# Patient Record
Sex: MALE | Race: WHITE | HISPANIC OR LATINO | Employment: FULL TIME | URBAN - METROPOLITAN AREA
[De-identification: names, ages, dates, MRNs, and addresses within clinical notes are randomized per-mention and may not be internally consistent; named-entity substitution may affect disease eponyms.]

---

## 2017-03-13 ENCOUNTER — ALLSCRIPTS OFFICE VISIT (OUTPATIENT)
Dept: OTHER | Facility: OTHER | Age: 32
End: 2017-03-13

## 2017-03-20 ENCOUNTER — OFFICE VISIT (OUTPATIENT)
Dept: URGENT CARE | Age: 32
End: 2017-03-20
Payer: MEDICARE

## 2017-03-20 PROCEDURE — 99203 OFFICE O/P NEW LOW 30 MIN: CPT | Performed by: FAMILY MEDICINE

## 2017-03-20 PROCEDURE — G0463 HOSPITAL OUTPT CLINIC VISIT: HCPCS | Performed by: FAMILY MEDICINE

## 2017-05-07 ENCOUNTER — TRANSCRIBE ORDERS (OUTPATIENT)
Dept: LAB | Facility: HOSPITAL | Age: 32
End: 2017-05-07

## 2017-05-17 ENCOUNTER — APPOINTMENT (OUTPATIENT)
Dept: LAB | Facility: HOSPITAL | Age: 32
End: 2017-05-17
Payer: MEDICARE

## 2017-05-17 ENCOUNTER — TRANSCRIBE ORDERS (OUTPATIENT)
Dept: LAB | Facility: HOSPITAL | Age: 32
End: 2017-05-17

## 2017-05-17 DIAGNOSIS — M25.649: ICD-10-CM

## 2017-05-17 DIAGNOSIS — M25.649: Primary | ICD-10-CM

## 2017-05-17 PROCEDURE — 36415 COLL VENOUS BLD VENIPUNCTURE: CPT

## 2017-05-17 PROCEDURE — 86038 ANTINUCLEAR ANTIBODIES: CPT

## 2017-05-17 PROCEDURE — 86430 RHEUMATOID FACTOR TEST QUAL: CPT

## 2017-05-17 PROCEDURE — 86235 NUCLEAR ANTIGEN ANTIBODY: CPT

## 2017-05-18 LAB
ENA SS-A AB SER-ACNC: <0.2 AI (ref 0–0.9)
ENA SS-B AB SER-ACNC: <0.2 AI (ref 0–0.9)
RHEUMATOID FACT SER QL LA: NEGATIVE

## 2017-05-19 LAB — RYE IGE QN: NEGATIVE

## 2017-06-30 ENCOUNTER — TRANSCRIBE ORDERS (OUTPATIENT)
Dept: ADMINISTRATIVE | Facility: HOSPITAL | Age: 32
End: 2017-06-30

## 2017-06-30 DIAGNOSIS — N13.30 HYDRONEPHROSIS, UNSPECIFIED HYDRONEPHROSIS TYPE: ICD-10-CM

## 2017-06-30 DIAGNOSIS — N18.9 CHRONIC KIDNEY DISEASE, UNSPECIFIED: Primary | ICD-10-CM

## 2017-07-07 ENCOUNTER — HOSPITAL ENCOUNTER (OUTPATIENT)
Dept: RADIOLOGY | Facility: HOSPITAL | Age: 32
Discharge: HOME | End: 2017-07-07
Attending: UROLOGY
Payer: MEDICARE

## 2017-07-07 DIAGNOSIS — N18.9 CHRONIC KIDNEY DISEASE, UNSPECIFIED: ICD-10-CM

## 2017-07-07 DIAGNOSIS — N13.30 HYDRONEPHROSIS, UNSPECIFIED HYDRONEPHROSIS TYPE: ICD-10-CM

## 2017-07-07 PROCEDURE — 76770 US EXAM ABDO BACK WALL COMP: CPT

## 2017-09-01 ENCOUNTER — APPOINTMENT (OUTPATIENT)
Dept: LAB | Facility: HOSPITAL | Age: 32
End: 2017-09-01
Attending: INTERNAL MEDICINE
Payer: MEDICARE

## 2017-09-01 ENCOUNTER — TRANSCRIBE ORDERS (OUTPATIENT)
Dept: LAB | Facility: HOSPITAL | Age: 32
End: 2017-09-01

## 2017-09-01 DIAGNOSIS — N18.30 CHRONIC KIDNEY DISEASE, STAGE III (MODERATE) (HCC): Primary | ICD-10-CM

## 2017-09-01 DIAGNOSIS — N18.30 CHRONIC KIDNEY DISEASE, STAGE III (MODERATE) (HCC): ICD-10-CM

## 2017-09-01 LAB
ALBUMIN SERPL BCP-MCNC: 4.2 G/DL (ref 3.5–5)
ANION GAP SERPL CALCULATED.3IONS-SCNC: 6 MMOL/L (ref 4–13)
BASOPHILS # BLD AUTO: 0.01 THOUSANDS/ΜL (ref 0–0.1)
BASOPHILS NFR BLD AUTO: 0 % (ref 0–1)
BUN SERPL-MCNC: 14 MG/DL (ref 5–25)
CALCIUM SERPL-MCNC: 9 MG/DL (ref 8.3–10.1)
CHLORIDE SERPL-SCNC: 103 MMOL/L (ref 100–108)
CO2 SERPL-SCNC: 27 MMOL/L (ref 21–32)
CREAT SERPL-MCNC: 1.34 MG/DL (ref 0.6–1.3)
CREAT UR-MCNC: <13 MG/DL
EOSINOPHIL # BLD AUTO: 0.15 THOUSAND/ΜL (ref 0–0.61)
EOSINOPHIL NFR BLD AUTO: 3 % (ref 0–6)
ERYTHROCYTE [DISTWIDTH] IN BLOOD BY AUTOMATED COUNT: 13 % (ref 11.6–15.1)
GFR SERPL CREATININE-BSD FRML MDRD: 71 ML/MIN/1.73SQ M
GLUCOSE SERPL-MCNC: 73 MG/DL (ref 65–140)
HCT VFR BLD AUTO: 45.2 % (ref 36.5–49.3)
HGB BLD-MCNC: 15.5 G/DL (ref 12–17)
LYMPHOCYTES # BLD AUTO: 1.24 THOUSANDS/ΜL (ref 0.6–4.47)
LYMPHOCYTES NFR BLD AUTO: 21 % (ref 14–44)
MCH RBC QN AUTO: 30.8 PG (ref 26.8–34.3)
MCHC RBC AUTO-ENTMCNC: 34.3 G/DL (ref 31.4–37.4)
MCV RBC AUTO: 90 FL (ref 82–98)
MONOCYTES # BLD AUTO: 0.59 THOUSAND/ΜL (ref 0.17–1.22)
MONOCYTES NFR BLD AUTO: 10 % (ref 4–12)
NEUTROPHILS # BLD AUTO: 3.78 THOUSANDS/ΜL (ref 1.85–7.62)
NEUTS SEG NFR BLD AUTO: 66 % (ref 43–75)
NRBC BLD AUTO-RTO: 0 /100 WBCS
PHOSPHATE SERPL-MCNC: 2.5 MG/DL (ref 2.7–4.5)
PLATELET # BLD AUTO: 288 THOUSANDS/UL (ref 149–390)
PMV BLD AUTO: 10.8 FL (ref 8.9–12.7)
POTASSIUM SERPL-SCNC: 4.2 MMOL/L (ref 3.5–5.3)
PROT UR-MCNC: <6 MG/DL
PTH-INTACT SERPL-MCNC: 30.5 PG/ML (ref 14–72)
RBC # BLD AUTO: 5.04 MILLION/UL (ref 3.88–5.62)
SODIUM SERPL-SCNC: 136 MMOL/L (ref 136–145)
WBC # BLD AUTO: 5.81 THOUSAND/UL (ref 4.31–10.16)

## 2017-09-01 PROCEDURE — 83970 ASSAY OF PARATHORMONE: CPT

## 2017-09-01 PROCEDURE — 85025 COMPLETE CBC W/AUTO DIFF WBC: CPT

## 2017-09-01 PROCEDURE — 84156 ASSAY OF PROTEIN URINE: CPT

## 2017-09-01 PROCEDURE — 82570 ASSAY OF URINE CREATININE: CPT

## 2017-09-01 PROCEDURE — 80069 RENAL FUNCTION PANEL: CPT

## 2017-09-13 ENCOUNTER — ALLSCRIPTS OFFICE VISIT (OUTPATIENT)
Dept: OTHER | Facility: OTHER | Age: 32
End: 2017-09-13

## 2018-01-13 NOTE — MISCELLANEOUS
Message   Recorded as Task   Date: 07/25/2016 03:37 PM, Created By: Deniz Cat   Task Name: Miscellaneous   Assigned To: [facility] Alfred[facility] Edwards   Regarding Patient: Shaniqua Alvarez, Status: In Progress   Comment:    Adria Franklin - 25 Jul 2016 3:37 PM     TASK CREATED  Renal US reviewed - unremarkable left kidney and bladder, stable known severe chronic right hydronephrosis  Please call patient - no changes for now, follow up as instructed upcomng visit  ThanksKatie - 25 Jul 2016 3:40 PM     TASK IN PROGRESS   Per Dr April Yeung renal US reviewed- unremarkable left kidney and bladder, stable known severe chronic right hydronephrosis  Please let the patient know that no changes for know and follow up as instructed  Spoke with the patient and he is aware of his test results  [employer]      Active Problems    1  Abnormal gait (781 2) (R26 9)   2  Acute bronchitis, unspecified organism (466 0) (J20 9)   3  Asthma (493 90) (J45 909)   4  CKD (chronic kidney disease), stage III (585 3) (N18 3)   5  HTN (hypertension), benign (401 1) (I10)   6  Hydronephrosis, right (591) (N13 30)   7  Learning disability (315 2) (F81 9)   8  Screening for depression (V79 0) (Z13 89)   9  Vitamin D deficiency (268 9) (E55 9)    Current Meds   1  Ergocalciferol 41364 UNIT Oral Capsule; TAKE 1 CAPSULE WEEKLY; Therapy: 13SYL0015 to (Last Rx:[id number])  Requested for: 21Pmk9380 Ordered    Allergies    1  Sulphur-Heel SUBL    2  No Known Environmental Allergies   3   No Known Food Allergies    Signatures   Electronically signed by : ALEJANDRO Angela ; Jul 25 2016  3:50PM EST

## 2018-01-14 VITALS
HEART RATE: 72 BPM | BODY MASS INDEX: 27.84 KG/M2 | SYSTOLIC BLOOD PRESSURE: 122 MMHG | WEIGHT: 177.38 LBS | HEIGHT: 67 IN | TEMPERATURE: 97.9 F | DIASTOLIC BLOOD PRESSURE: 86 MMHG | OXYGEN SATURATION: 99 %

## 2018-01-15 VITALS
HEIGHT: 67 IN | BODY MASS INDEX: 28.09 KG/M2 | WEIGHT: 179 LBS | SYSTOLIC BLOOD PRESSURE: 128 MMHG | HEART RATE: 88 BPM | DIASTOLIC BLOOD PRESSURE: 78 MMHG

## 2018-01-16 NOTE — MISCELLANEOUS
Message  Patient came into the office today, to  a copy of his lab slips dated for June 2016  Patient did not want to schedule a office follow up appointment today  Active Problems    1  Abnormal gait (781 2) (R26 9)   2  Acute bronchitis, unspecified organism (466 0) (J20 9)   3  Asthma (493 90) (J45 909)   4  CKD (chronic kidney disease), stage III (585 3) (N18 3)   5  HTN (hypertension), benign (401 1) (I10)   6  Hydronephrosis, right (591) (N13 30)   7  Learning disability (315 2) (F81 9)   8  Screening for depression (V79 0) (Z13 89)   9  Vitamin D deficiency (268 9) (E55 9)    Current Meds   1  Ergocalciferol 90176 UNIT Oral Capsule; TAKE 1 CAPSULE WEEKLY; Therapy: 39KRN6695 to (Last Rx:[id number])  Requested for: 13Gjo9006 Ordered    Allergies    1  Sulphur-Heel SUBL    2  No Known Environmental Allergies   3   No Known Food Allergies    Signatures   Electronically signed by : ALEJANDRO Somers ; Jun 20 2016  9:48AM EST

## 2018-06-22 NOTE — PROGRESS NOTES
Patient has a hydronephrotic nonfunctioning right kidney    He has chronic renal disease and is followed by Nephrology, no urologic intervention

## 2018-06-28 ENCOUNTER — TELEPHONE (OUTPATIENT)
Dept: NEPHROLOGY | Facility: CLINIC | Age: 33
End: 2018-06-28

## 2018-06-28 NOTE — TELEPHONE ENCOUNTER
Patients Primary care doctor sent over his results for a BMP on June 1st and wants you to go over the results

## 2018-06-29 NOTE — TELEPHONE ENCOUNTER
Lab results from 06/01 reviewed, renal function stable  No changes at this moment, follow up as discussed before in 1 years form last office visit    Thanks,

## 2018-07-13 ENCOUNTER — OFFICE VISIT (OUTPATIENT)
Dept: UROLOGY | Facility: CLINIC | Age: 33
End: 2018-07-13
Payer: MEDICARE

## 2018-07-13 VITALS
BODY MASS INDEX: 29.89 KG/M2 | SYSTOLIC BLOOD PRESSURE: 116 MMHG | HEIGHT: 66 IN | HEART RATE: 75 BPM | DIASTOLIC BLOOD PRESSURE: 82 MMHG | WEIGHT: 186 LBS

## 2018-07-13 DIAGNOSIS — N18.9 CHRONIC KIDNEY DISEASE, UNSPECIFIED CKD STAGE: ICD-10-CM

## 2018-07-13 DIAGNOSIS — N13.39 OTHER HYDRONEPHROSIS: Primary | ICD-10-CM

## 2018-07-13 LAB
POST-VOID RESIDUAL VOLUME, ML POC: 16 ML
SL AMB  POCT GLUCOSE, UA: NORMAL
SL AMB LEUKOCYTE ESTERASE,UA: NORMAL
SL AMB POCT BILIRUBIN,UA: NORMAL
SL AMB POCT BLOOD,UA: NORMAL
SL AMB POCT CLARITY,UA: CLEAR
SL AMB POCT COLOR,UA: YELLOW
SL AMB POCT KETONES,UA: NORMAL
SL AMB POCT NITRITE,UA: NORMAL
SL AMB POCT PH,UA: 8
SL AMB POCT SPECIFIC GRAVITY,UA: 1
SL AMB POCT URINE PROTEIN: NORMAL
SL AMB POCT UROBILINOGEN: NORMAL

## 2018-07-13 PROCEDURE — 81002 URINALYSIS NONAUTO W/O SCOPE: CPT | Performed by: PHYSICIAN ASSISTANT

## 2018-07-13 PROCEDURE — 51798 US URINE CAPACITY MEASURE: CPT | Performed by: PHYSICIAN ASSISTANT

## 2018-07-13 PROCEDURE — 99213 OFFICE O/P EST LOW 20 MIN: CPT | Performed by: PHYSICIAN ASSISTANT

## 2018-07-13 NOTE — PROGRESS NOTES
UROLOGY PROGRESS NOTE   Patient Identifiers: Sol Guerrero (MRN [id number])  Date of Service: 7/13/2018    Subjective:     26-year-old male accompanied by his mother  He has a long history of a right UPJ obstruction  He has minimal function from his right kidney  He has associated chronic kidney disease and sees Dr Bebeto Hudson  His last creatinine was 1 34  Urine is clear  Postvoid residual is 16 milliliters  He has no dysuria or hematuria  He had a recent sleep study and also sees a psychiatrist     Patient has  no complaints  Objective:     VITALS:    Vitals:    07/13/18 1102   BP: 116/82   Pulse: 75     AUA SYMPTOM SCORE      Most Recent Value   AUA SYMPTOM SCORE   How often have you had a sensation of not emptying your bladder completely after you finished urinating? 4   How often have you had to urinate again less than two hours after you finished urinating? 3   How often have you found you stopped and started again several times when you urinate? 4   How often have you found it difficult to postpone urination? 4   How often have you had a weak urinary stream?  2   How often have you had to push or strain to begin urination? 2   How many times did you most typically get up to urinate from the time you went to bed at night until the time you got up in the morning?   3   Quality of Life: If you were to spend the rest of your life with your urinary condition just the way it is now, how would you feel about that?  3   AUA SYMPTOM SCORE  22            LABS:  Lab Results   Component Value Date    HGB 15 5 09/01/2017    HCT 45 2 09/01/2017    WBC 5 81 09/01/2017     09/01/2017   ]    Lab Results   Component Value Date     09/01/2017    K 4 2 09/01/2017     09/01/2017    CO2 27 09/01/2017    BUN 14 09/01/2017    CREATININE 1 34 (H) 09/01/2017    CALCIUM 9 0 09/01/2017    GLUCOSE 73 09/01/2017   ]    INPATIENT MEDS:    Current Outpatient Prescriptions:     acetaminophen (TYLENOL) 325 mg tablet, Take 650 mg by mouth every 6 (six) hours as needed for mild pain , Disp: , Rfl:     Cholecalciferol (VITAMIN D PO), Take 4,000 Units by mouth daily  , Disp: , Rfl:       Physical Exam:   /82 (Patient Position: Sitting, Cuff Size: Adult)   Pulse 75   Ht 5' 5 5" (1 664 m)   Wt 84 4 kg (186 lb)   BMI 30 48 kg/m²   GEN: no acute distress    RESP: breathing comfortably with no accessory muscle use    ABD: soft, non-tender, non-distended   INCISION:    EXT: no significant peripheral edema     RADIOLOGY:     Follow-up ultrasound is scheduled for later this year     Assessment:    1  Right hydronephrosis secondary to a chronic UPJ obstruction   2  Chronic kidney disease     Plan:   - I reviewed the plan with his mother  He will follow up on an as-needed basis and call with any questions or concerns    -  -  -

## 2018-08-26 ENCOUNTER — APPOINTMENT (OUTPATIENT)
Dept: LAB | Facility: HOSPITAL | Age: 33
End: 2018-08-26
Payer: MEDICARE

## 2018-08-26 ENCOUNTER — TRANSCRIBE ORDERS (OUTPATIENT)
Dept: LAB | Facility: HOSPITAL | Age: 33
End: 2018-08-26

## 2018-08-26 DIAGNOSIS — R51.9 ACUTE NONINTRACTABLE HEADACHE, UNSPECIFIED HEADACHE TYPE: ICD-10-CM

## 2018-08-26 DIAGNOSIS — R61 NIGHT SWEATS: Primary | ICD-10-CM

## 2018-08-26 DIAGNOSIS — R61 NIGHT SWEATS: ICD-10-CM

## 2018-08-26 LAB
ALBUMIN SERPL BCP-MCNC: 4 G/DL (ref 3.5–5)
ALP SERPL-CCNC: 67 U/L (ref 46–116)
ALT SERPL W P-5'-P-CCNC: 19 U/L (ref 12–78)
ANION GAP SERPL CALCULATED.3IONS-SCNC: 5 MMOL/L (ref 4–13)
AST SERPL W P-5'-P-CCNC: 15 U/L (ref 5–45)
BASOPHILS # BLD AUTO: 0.02 THOUSANDS/ΜL (ref 0–0.1)
BASOPHILS NFR BLD AUTO: 0 % (ref 0–1)
BILIRUB SERPL-MCNC: 0.47 MG/DL (ref 0.2–1)
BUN SERPL-MCNC: 24 MG/DL (ref 5–25)
CALCIUM SERPL-MCNC: 8.9 MG/DL (ref 8.3–10.1)
CHLORIDE SERPL-SCNC: 106 MMOL/L (ref 100–108)
CO2 SERPL-SCNC: 27 MMOL/L (ref 21–32)
CREAT SERPL-MCNC: 1.29 MG/DL (ref 0.6–1.3)
EOSINOPHIL # BLD AUTO: 0.09 THOUSAND/ΜL (ref 0–0.61)
EOSINOPHIL NFR BLD AUTO: 2 % (ref 0–6)
ERYTHROCYTE [DISTWIDTH] IN BLOOD BY AUTOMATED COUNT: 12.7 % (ref 11.6–15.1)
GFR SERPL CREATININE-BSD FRML MDRD: 74 ML/MIN/1.73SQ M
GLUCOSE SERPL-MCNC: 57 MG/DL (ref 65–140)
HCT VFR BLD AUTO: 45.8 % (ref 36.5–49.3)
HGB BLD-MCNC: 14.3 G/DL (ref 12–17)
IMM GRANULOCYTES # BLD AUTO: 0.04 THOUSAND/UL (ref 0–0.2)
IMM GRANULOCYTES NFR BLD AUTO: 1 % (ref 0–2)
LYMPHOCYTES # BLD AUTO: 1.11 THOUSANDS/ΜL (ref 0.6–4.47)
LYMPHOCYTES NFR BLD AUTO: 19 % (ref 14–44)
MCH RBC QN AUTO: 29.1 PG (ref 26.8–34.3)
MCHC RBC AUTO-ENTMCNC: 31.2 G/DL (ref 31.4–37.4)
MCV RBC AUTO: 93 FL (ref 82–98)
MONOCYTES # BLD AUTO: 0.57 THOUSAND/ΜL (ref 0.17–1.22)
MONOCYTES NFR BLD AUTO: 10 % (ref 4–12)
NEUTROPHILS # BLD AUTO: 4.02 THOUSANDS/ΜL (ref 1.85–7.62)
NEUTS SEG NFR BLD AUTO: 68 % (ref 43–75)
NRBC BLD AUTO-RTO: 0 /100 WBCS
PLATELET # BLD AUTO: 271 THOUSANDS/UL (ref 149–390)
PMV BLD AUTO: 10.6 FL (ref 8.9–12.7)
POTASSIUM SERPL-SCNC: 3.9 MMOL/L (ref 3.5–5.3)
PROT SERPL-MCNC: 7.6 G/DL (ref 6.4–8.2)
RBC # BLD AUTO: 4.91 MILLION/UL (ref 3.88–5.62)
SODIUM SERPL-SCNC: 138 MMOL/L (ref 136–145)
TSH SERPL DL<=0.05 MIU/L-ACNC: 1.12 UIU/ML (ref 0.36–3.74)
WBC # BLD AUTO: 5.85 THOUSAND/UL (ref 4.31–10.16)

## 2018-08-26 PROCEDURE — 80053 COMPREHEN METABOLIC PANEL: CPT

## 2018-08-26 PROCEDURE — 86618 LYME DISEASE ANTIBODY: CPT

## 2018-08-26 PROCEDURE — 84443 ASSAY THYROID STIM HORMONE: CPT

## 2018-08-26 PROCEDURE — 86480 TB TEST CELL IMMUN MEASURE: CPT

## 2018-08-26 PROCEDURE — 36415 COLL VENOUS BLD VENIPUNCTURE: CPT

## 2018-08-26 PROCEDURE — 85025 COMPLETE CBC W/AUTO DIFF WBC: CPT

## 2018-08-27 LAB
B BURGDOR IGG SER IA-ACNC: 0.07
B BURGDOR IGM SER IA-ACNC: 0.24

## 2018-08-29 LAB — QUANTIFERON-TB GOLD IN TUBE: NORMAL

## 2018-09-01 DIAGNOSIS — N18.30 CHRONIC KIDNEY DISEASE, STAGE III (MODERATE) (HCC): ICD-10-CM

## 2018-09-01 DIAGNOSIS — N13.30 HYDRONEPHROSIS: ICD-10-CM

## 2018-09-09 ENCOUNTER — APPOINTMENT (OUTPATIENT)
Dept: LAB | Facility: HOSPITAL | Age: 33
End: 2018-09-09
Attending: INTERNAL MEDICINE
Payer: MEDICARE

## 2018-09-09 DIAGNOSIS — N18.30 CHRONIC KIDNEY DISEASE, STAGE III (MODERATE) (HCC): ICD-10-CM

## 2018-09-09 DIAGNOSIS — N13.30 HYDRONEPHROSIS: ICD-10-CM

## 2018-09-09 LAB
ALBUMIN SERPL BCP-MCNC: 4.1 G/DL (ref 3.5–5)
ANION GAP SERPL CALCULATED.3IONS-SCNC: 4 MMOL/L (ref 4–13)
BUN SERPL-MCNC: 19 MG/DL (ref 5–25)
CALCIUM SERPL-MCNC: 9.1 MG/DL (ref 8.3–10.1)
CHLORIDE SERPL-SCNC: 101 MMOL/L (ref 100–108)
CO2 SERPL-SCNC: 28 MMOL/L (ref 21–32)
CREAT SERPL-MCNC: 1.43 MG/DL (ref 0.6–1.3)
CREAT UR-MCNC: 156 MG/DL
GFR SERPL CREATININE-BSD FRML MDRD: 65 ML/MIN/1.73SQ M
GLUCOSE SERPL-MCNC: 58 MG/DL (ref 65–140)
PHOSPHATE SERPL-MCNC: 3 MG/DL (ref 2.7–4.5)
POTASSIUM SERPL-SCNC: 3.9 MMOL/L (ref 3.5–5.3)
PROT UR-MCNC: 8 MG/DL
PROT/CREAT UR: 0.05 MG/G{CREAT} (ref 0–0.1)
PTH-INTACT SERPL-MCNC: 32.2 PG/ML (ref 18.4–80.1)
SODIUM SERPL-SCNC: 133 MMOL/L (ref 136–145)

## 2018-09-09 PROCEDURE — 84156 ASSAY OF PROTEIN URINE: CPT

## 2018-09-09 PROCEDURE — 80069 RENAL FUNCTION PANEL: CPT

## 2018-09-09 PROCEDURE — 36415 COLL VENOUS BLD VENIPUNCTURE: CPT

## 2018-09-09 PROCEDURE — 82570 ASSAY OF URINE CREATININE: CPT

## 2018-09-09 PROCEDURE — 83970 ASSAY OF PARATHORMONE: CPT

## 2018-09-11 ENCOUNTER — TELEPHONE (OUTPATIENT)
Dept: NEPHROLOGY | Facility: CLINIC | Age: 33
End: 2018-09-11

## 2018-09-11 NOTE — TELEPHONE ENCOUNTER
Blood test reviewed, renal function is stable  Please call patient and arrange a follow-up appointment with me sooner, last time he was seen was in September 2017    Thanks,

## 2018-10-05 ENCOUNTER — OFFICE VISIT (OUTPATIENT)
Dept: NEPHROLOGY | Facility: CLINIC | Age: 33
End: 2018-10-05
Payer: MEDICARE

## 2018-10-05 VITALS
SYSTOLIC BLOOD PRESSURE: 118 MMHG | HEART RATE: 74 BPM | WEIGHT: 182.8 LBS | BODY MASS INDEX: 28.69 KG/M2 | DIASTOLIC BLOOD PRESSURE: 78 MMHG | HEIGHT: 67 IN

## 2018-10-05 DIAGNOSIS — N18.30 STAGE 3 CHRONIC KIDNEY DISEASE (HCC): Primary | ICD-10-CM

## 2018-10-05 DIAGNOSIS — N13.30 HYDRONEPHROSIS OF RIGHT KIDNEY: ICD-10-CM

## 2018-10-05 PROCEDURE — 99214 OFFICE O/P EST MOD 30 MIN: CPT | Performed by: INTERNAL MEDICINE

## 2018-10-05 RX ORDER — FLUTICASONE PROPIONATE 50 MCG
2 SPRAY, SUSPENSION (ML) NASAL DAILY
COMMUNITY
Start: 2018-09-21 | End: 2019-09-21

## 2018-10-05 RX ORDER — ALBUTEROL SULFATE 90 UG/1
2 AEROSOL, METERED RESPIRATORY (INHALATION) EVERY 6 HOURS
COMMUNITY
Start: 2018-09-21 | End: 2019-09-21

## 2018-10-05 NOTE — PATIENT INSTRUCTIONS
I would like to see you back in the office in 1 year with repeat labs  Advised to follow a low-salt diet, avoid NSAIDs (no ibuprofen, Motrin, Aleve, Naprosyn, glucose)  Okay to take Tylenol as needed for pain  Have a regular and healthy lifestyle, do regular physical activities, avoid gaining more weight and consider start losing weight  Chronic Kidney Disease   WHAT YOU NEED TO KNOW:   What is chronic kidney disease (CKD)? CKD is the gradual and permanent loss of kidney function  It is also called chronic kidney failure, or chronic renal insufficiency  Normally, the kidneys remove fluid, chemicals, and waste from your blood  These wastes are turned into urine by your kidneys  CKD may worsen over time and lead to kidney failure  What increases my risk for CKD? · Diabetes or obesity    · High blood pressure or heart disease    · Kidney infections or kidney stones    · Autoimmune diseases, such as lupus    · An enlarged prostate    · NSAIDs, illegal drugs, or smoking    · Family history of kidney disease  What are the signs and symptoms of CKD? Your signs and symptoms will depend on how well your kidneys work  You may not have symptoms, or you may have any of the following:  · Changes in how often you need to urinate    · Swelling in your arms, legs, or feet    · Shortness of breath    · Fatigue or weakness    · Bad or bitter taste in your mouth    · Nausea, vomiting, or loss of appetite  How is CKD diagnosed? · Blood and urine tests  show how well your kidneys are working  They may also help manage or show the cause of CKD  · An ultrasound, CT scan, or MRI  may show the cause of CKD  You may be given contrast liquid to help your kidneys show up better in the pictures  Tell the healthcare provider if you have ever had an allergic reaction to contrast liquid  Do not enter the MRI room with anything metal  Metal can cause serious injury   Tell the healthcare provider if you have any metal in or on your body  · A biopsy  is a procedure to remove a small piece of tissue from your kidney  It is done to find the cause of your CKD  How is CKD treated? The goals of treatment are to control your symptoms and prevent your CKD from getting worse  You may need the following:  · Medicines  may be given to decrease blood pressure and get rid of extra fluid  You may also receive medicine to manage health conditions that may occur with CKD, such as anemia, diabetes, and heart disease  · Dialysis  is a treatment to remove chemicals and waste from your blood when your kidneys can no longer do this  · Surgery  may be needed to create an arteriovenous fistula (AVF) in your arm or insert a catheter into your abdomen  This is done so you can receive dialysis  · A kidney transplant  may be done if your CKD becomes severe  How can I manage CKD? · Maintain a healthy weight  Ask your healthcare provider how much you should weigh  Ask him to help you create a weight loss plan if you are overweight  · Exercise 30 to 60 minutes a day, 4 to 7 times a week, or as directed  Ask about the best exercise plan for you  Regular exercise can help you manage CKD, high blood pressure, and diabetes  · Follow your healthcare provider's advice about what to eat and drink  He may tell you to eat food low in sodium (salt), potassium, phosphorus, or protein  You may need to see a dietitian if you need help planning meals  Ask how much liquid to drink each day and which liquids are best for you  · Limit alcohol  Ask how much alcohol is safe for you to drink  A drink of alcohol is 12 ounces of beer, 5 ounces of wine, or 1½ ounces of liquor  · Do not smoke  Nicotine and other chemicals in cigarettes and cigars can cause lung and kidney damage  Ask your healthcare provider for information if you currently smoke and need help to quit  E-cigarettes or smokeless tobacco still contain nicotine   Talk to your healthcare provider before you use these products  · Ask your healthcare provider if you need vaccines  Infections such as pneumonia, influenza, and hepatitis can be more harmful or more likely to occur in a person who has CKD  Vaccines reduce your risk of infection with these viruses  When should I seek immediate care? · You are confused and very drowsy  · You have a seizure  · You have shortness of breath  When should I contact my healthcare provider? · You suddenly gain or lose more weight than your healthcare provider has told you is okay  · You have itchy skin or a rash  · You urinate more or less than you normally do  · You have blood in your urine  · You have nausea and repeated vomiting  · You have fatigue or muscle weakness  · You have hiccups that will not stop  · You have questions or concerns about your condition or care  CARE AGREEMENT:   You have the right to help plan your care  Learn about your health condition and how it may be treated  Discuss treatment options with your caregivers to decide what care you want to receive  You always have the right to refuse treatment  The above information is an  only  It is not intended as medical advice for individual conditions or treatments  Talk to your doctor, nurse or pharmacist before following any medical regimen to see if it is safe and effective for you  © 2017 [employer]  Information is for End User's use only and may not be sold, redistributed or otherwise used for commercial purposes  All illustrations and images included in CareNotes® are the copyrighted property of A D A M [employer] Inc  or Marek Glover

## 2018-10-05 NOTE — LETTER
October 5, 2018     [address] [place] [id number]    Patient: Shirley Ledbetter   YOB: 1985   Date of Visit: 10/5/2018       Dear Dr Frederick Sauceda: Thank you for referring Corinne Echevarria to me for evaluation  Below are my notes for this consultation  If you have questions, please do not hesitate to call me  I look forward to following your patient along with you  Sincerely,        Helder Davenport MD        CC: No Recipients  Helder Davenport MD  10/5/2018 10:28 AM  Sign at close encounter  [facility] [facility] - Nephrology   Shirley Ledbetter 29 y o  male MRN: [id number]       ASSESSMENT and PLAN:  Rodrick Longoria was seen today for follow-up  Diagnoses and all orders for this visit:    Stage 3 chronic kidney disease ([facility] [address] )  -     CBC; Future  -     Protein / creatinine ratio, urine; Future  -     PTH, intact; Future  -     Renal function panel; Future    Hydronephrosis of right kidney        57-year-old gentleman who returns to the office for CKD follow-up  1   Chronic kidney disease stage 3, previous creatinine around 1 3-1 6 since 2019 in the setting of chronic hydronephrosis and suspected solitary functional kidney  Most recent creatinine 1 4 with no proteinuria  Given that his kidney function remains fairly stable I would like to see him back in 1 year with repeat labs  Advised about avoiding NSAIDs  Advised to have a healthy lifestyle and to avoid gaining more weight  2   Chronic severe right hydronephrosis follows with Urology  3   Hemoglobin normal     4   Mineral bone disease, phosphorus and PTH normal     5   Hemodynamics, blood pressure currently well controlled  Patient Instructions   I would like to see you back in the office in 1 year with repeat labs  Advised to follow a low-salt diet, avoid NSAIDs (no ibuprofen, Motrin, Aleve, Naprosyn, glucose)  Okay to take Tylenol as needed for pain    Have a regular and healthy lifestyle, do regular physical activities, avoid gaining more weight and consider start losing weight  HPI: Juliane Meeks is a 29 y o  male who is here for Follow-up    Last time was 09/2017, today returns for year follow up with  Since our last visit, went to ER at [facility] due to headaches and dizziness  Patient currently has no complaints at this time and is feeling well  He is currently recovering for a recent cold  Patient denies any chest pain, shortness of breath and swelling  The last blood work was done on 09/09, which we have reviewed together  Denies urinary problems, appetite is OK, weight increased 3 lbs over last year  Only takes Tylenol for pain  Does not smoke  ROS:   All the systems were reviewed and were negative except as documented on the HPI  Allergies: Sulfa antibiotics    Medications:   Current Outpatient Prescriptions:     acetaminophen (TYLENOL) 325 mg tablet, Take 650 mg by mouth every 6 (six) hours as needed for mild pain , Disp: , Rfl:     albuterol (PROVENTIL HFA,VENTOLIN HFA) 90 mcg/act inhaler, Inhale 2 puffs every 6 (six) hours, Disp: , Rfl:     Cholecalciferol (VITAMIN D PO), Take 4,000 Units by mouth daily  , Disp: , Rfl:     fluticasone (FLONASE) 50 mcg/act nasal spray, 2 sprays into each nostril, Disp: , Rfl:     Past Medical History:   Diagnosis Date    Renal failure      Past Surgical History:   Procedure Laterality Date    TONSILLECTOMY AND ADENOIDECTOMY       Family History   Problem Relation Age of Onset    Family history unknown: Yes      reports that he has never smoked  He has never used smokeless tobacco  He reports that he does not drink alcohol or use drugs  Physical Exam:   Vitals:    10/05/18 0959 10/05/18 1024   BP:  118/78   BP Location:  Left arm   Patient Position:  Sitting   Pulse:  74   Weight: 82 9 kg (182 lb 12 8 oz)    Height: 5' 7" (1 702 m)      Body mass index is 28 63 kg/m²      General: cooperative, in not acute distress  Eyes: conjunctivae pink, anicteric sclerae  ENT: lips and mucous membranes moist  Neck: supple, no JVD  Chest: clear breath sounds bilateral, no crackles, ronchus or wheezings  CVS: distinct S1 & S2, normal rate, regular rhythm  Abdomen: soft, non-tender, non-distended, normoactive bowel sounds  Extremities: no edema of both legs  Skin: no rash  Neuro: awake, alert, oriented      Lab Results:  Results for orders placed or performed in visit on 09/09/18   Renal function panel   Result Value Ref Range    Albumin 4 1 3 5 - 5 0 g/dL    Calcium 9 1 8 3 - 10 1 mg/dL    Phosphorus 3 0 2 7 - 4 5 mg/dL    Glucose 58 (L) 65 - 140 mg/dL    BUN 19 5 - 25 mg/dL    Creatinine 1 43 (H) 0 60 - 1 30 mg/dL    Sodium 133 (L) 136 - 145 mmol/L    Potassium 3 9 3 5 - 5 3 mmol/L    Chloride 101 100 - 108 mmol/L    CO2 28 21 - 32 mmol/L    ANION GAP 4 4 - 13 mmol/L    eGFR 65 ml/min/1 73sq m   PTH, intact   Result Value Ref Range    PTH 32 2 18 4 - 80 1 pg/mL   Protein / creatinine ratio, urine   Result Value Ref Range    Creatinine, Ur 156 0 mg/dL    Protein Urine Random 8 mg/dL    Prot/Creat Ratio, Ur 0 05 0 00 - 0 10         Portions of the record may have been created with voice recognition software  Occasional wrong word or "sound a like" substitutions may have occurred due to the inherent limitations of voice recognition software  Read the chart carefully and recognize, using context, where substitutions have occurred  If you have any questions, please contact the dictating provider

## 2018-10-05 NOTE — PROGRESS NOTES
OFFICE FOLLOW UP - Nephrology   Soto Vargas 29 y o  male MRN: [id number]       ASSESSMENT and PLAN:  Loly Melgar was seen today for follow-up  Diagnoses and all orders for this visit:    Stage 3 chronic kidney disease ([address] )  -     CBC; Future  -     Protein / creatinine ratio, urine; Future  -     PTH, intact; Future  -     Renal function panel; Future    Hydronephrosis of right kidney        27-year-old gentleman who returns to the office for CKD follow-up  1   Chronic kidney disease stage 3, previous creatinine around 1 3-1 6 since 2009 in the setting of chronic hydronephrosis and suspected solitary functional kidney  Most recent creatinine 1 4 with no proteinuria  Given that his kidney function remains fairly stable I would like to see him back in 1 year with repeat labs  Advised about avoiding NSAIDs  Advised to have a healthy lifestyle and to avoid gaining more weight  2   Chronic severe right hydronephrosis follows with Urology  3   Hemoglobin normal     4   Mineral bone disease, phosphorus and PTH normal     5   Hemodynamics, blood pressure currently well controlled  Patient Instructions   I would like to see you back in the office in 1 year with repeat labs  Advised to follow a low-salt diet, avoid NSAIDs (no ibuprofen, Motrin, Aleve, Naprosyn, glucose)  Okay to take Tylenol as needed for pain  Have a regular and healthy lifestyle, do regular physical activities, avoid gaining more weight and consider start losing weight  HPI: Soto Vargas is a 29 y o  male who is here for Follow-up    Last time was 09/2017, today returns for year follow up with  Since our last visit, went to ER at [facility] due to headaches and dizziness  Patient currently has no complaints at this time and is feeling well  He is currently recovering for a recent cold  Patient denies any chest pain, shortness of breath and swelling     The last blood work was done on 09/09, which we have reviewed together  Denies urinary problems, appetite is OK, weight increased 3 lbs over last year  Only takes Tylenol for pain  Does not smoke  ROS:   All the systems were reviewed and were negative except as documented on the HPI  Allergies: Sulfa antibiotics    Medications:   Current Outpatient Prescriptions:     acetaminophen (TYLENOL) 325 mg tablet, Take 650 mg by mouth every 6 (six) hours as needed for mild pain , Disp: , Rfl:     albuterol (PROVENTIL HFA,VENTOLIN HFA) 90 mcg/act inhaler, Inhale 2 puffs every 6 (six) hours, Disp: , Rfl:     Cholecalciferol (VITAMIN D PO), Take 4,000 Units by mouth daily  , Disp: , Rfl:     fluticasone (FLONASE) 50 mcg/act nasal spray, 2 sprays into each nostril, Disp: , Rfl:     Past Medical History:   Diagnosis Date    Renal failure      Past Surgical History:   Procedure Laterality Date    TONSILLECTOMY AND ADENOIDECTOMY       Family History   Problem Relation Age of Onset    Family history unknown: Yes      reports that he has never smoked  He has never used smokeless tobacco  He reports that he does not drink alcohol or use drugs  Physical Exam:   Vitals:    10/05/18 0959 10/05/18 1024   BP:  118/78   BP Location:  Left arm   Patient Position:  Sitting   Pulse:  74   Weight: 82 9 kg (182 lb 12 8 oz)    Height: 5' 7" (1 702 m)      Body mass index is 28 63 kg/m²      General: cooperative, in not acute distress  Eyes: conjunctivae pink, anicteric sclerae  ENT: lips and mucous membranes moist  Neck: supple, no JVD  Chest: clear breath sounds bilateral, no crackles, ronchus or wheezings  CVS: distinct S1 & S2, normal rate, regular rhythm  Abdomen: soft, non-tender, non-distended, normoactive bowel sounds  Extremities: no edema of both legs  Skin: no rash  Neuro: awake, alert, oriented      Lab Results:  Results for orders placed or performed in visit on 09/09/18   Renal function panel   Result Value Ref Range    Albumin 4 1 3 5 - 5 0 g/dL    Calcium 9 1 8 3 - 10 1 mg/dL    Phosphorus 3 0 2 7 - 4 5 mg/dL    Glucose 58 (L) 65 - 140 mg/dL    BUN 19 5 - 25 mg/dL    Creatinine 1 43 (H) 0 60 - 1 30 mg/dL    Sodium 133 (L) 136 - 145 mmol/L    Potassium 3 9 3 5 - 5 3 mmol/L    Chloride 101 100 - 108 mmol/L    CO2 28 21 - 32 mmol/L    ANION GAP 4 4 - 13 mmol/L    eGFR 65 ml/min/1 73sq m   PTH, intact   Result Value Ref Range    PTH 32 2 18 4 - 80 1 pg/mL   Protein / creatinine ratio, urine   Result Value Ref Range    Creatinine, Ur 156 0 mg/dL    Protein Urine Random 8 mg/dL    Prot/Creat Ratio, Ur 0 05 0 00 - 0 10         Portions of the record may have been created with voice recognition software  Occasional wrong word or "sound a like" substitutions may have occurred due to the inherent limitations of voice recognition software  Read the chart carefully and recognize, using context, where substitutions have occurred  If you have any questions, please contact the dictating provider

## 2019-02-24 ENCOUNTER — APPOINTMENT (OUTPATIENT)
Dept: LAB | Facility: HOSPITAL | Age: 34
End: 2019-02-24
Payer: MEDICARE

## 2019-02-24 ENCOUNTER — TRANSCRIBE ORDERS (OUTPATIENT)
Dept: LAB | Facility: HOSPITAL | Age: 34
End: 2019-02-24

## 2019-02-24 DIAGNOSIS — Z13.220 SCREENING FOR LIPOID DISORDERS: Primary | ICD-10-CM

## 2019-02-24 DIAGNOSIS — Z13.220 SCREENING FOR LIPOID DISORDERS: ICD-10-CM

## 2019-02-24 LAB
CHOLEST SERPL-MCNC: 206 MG/DL (ref 50–200)
HDLC SERPL-MCNC: 41 MG/DL (ref 40–60)
LDLC SERPL CALC-MCNC: 142 MG/DL (ref 0–100)
TRIGL SERPL-MCNC: 115 MG/DL

## 2019-02-24 PROCEDURE — 36415 COLL VENOUS BLD VENIPUNCTURE: CPT

## 2019-02-24 PROCEDURE — 80061 LIPID PANEL: CPT

## 2019-07-26 ENCOUNTER — TELEPHONE (OUTPATIENT)
Dept: NEPHROLOGY | Facility: CLINIC | Age: 34
End: 2019-07-26

## 2019-10-25 ENCOUNTER — OFFICE VISIT (OUTPATIENT)
Dept: NEPHROLOGY | Facility: CLINIC | Age: 34
End: 2019-10-25
Payer: MEDICARE

## 2019-10-25 VITALS
HEIGHT: 67 IN | DIASTOLIC BLOOD PRESSURE: 76 MMHG | HEART RATE: 74 BPM | WEIGHT: 190.6 LBS | BODY MASS INDEX: 29.91 KG/M2 | SYSTOLIC BLOOD PRESSURE: 124 MMHG

## 2019-10-25 DIAGNOSIS — Q62.11 HYDRONEPHROSIS WITH URETEROPELVIC JUNCTION (UPJ) OBSTRUCTION: ICD-10-CM

## 2019-10-25 DIAGNOSIS — N18.30 STAGE 3 CHRONIC KIDNEY DISEASE (HCC): Primary | ICD-10-CM

## 2019-10-25 PROCEDURE — 99213 OFFICE O/P EST LOW 20 MIN: CPT | Performed by: INTERNAL MEDICINE

## 2019-10-25 NOTE — LETTER
October 25, 2019     [address] [place] [id number]    Patient: Storm Tompkins   YOB: 1985   Date of Visit: 10/25/2019       Dear Dr Corinne Hoar: Thank you for referring Brandee Hernandez to me for evaluation  Below are my notes for this consultation  If you have questions, please do not hesitate to call me  I look forward to following your patient along with you  Sincerely,        Koki Menendez MD        CC: No Recipients  Koki Menendez MD  10/25/2019  3:36 PM  Sign at close encounter  [facility] - Nephrology   Storm Tompkins 34 y o  male MRN: [id number]       ASSESSMENT and PLAN:  Noemi Viveros was seen today for follow-up  Diagnoses and all orders for this visit:    Stage 3 chronic kidney disease ([address] )  -     Renal function panel; Future  -     PTH, intact; Future  -     Protein / creatinine ratio, urine; Future    Hydronephrosis with ureteropelvic junction (UPJ) obstruction        This is a 24-year-old gentleman who returns to the office for CKD follow-up  1   Chronic kidney disease stage 3, previous creatinine around 1 3-1 6 since 2009 in the setting of chronic hydronephrosis and suspected solitary functional kidney  Most recent creatinine 1 31 on 04/19/2019  Patient with recently to have his blood test couple of weeks ago but I only received the results of the CBC, will contact labs to see if the renal function panel was done, if not patient will go to lab again to have the rest of the test previously ordered  If renal function remains stable I would like to see him 1 year  Once again advised about avoiding NSAIDs, to continue with healthy lifestyle, to do regular exercise and to keep a good weight  2   Chronic severe right hydronephrosis, follows with Urology  3   Hemoglobin normal   Last hemoglobin 14 7 on 09/20/2019    4  Mineral bone disease, follow results      5   Hemodynamics, blood pressure currently well controlled  6  Tiredness, dizziness, most recent hemoglobin normal, blood pressure today is stable  Will follow results of renal function panel, if renal function remains stable recommend to discuss with his primary care doctor for further investigation  Patient Instructions   I want you to have repeat blood and urine test, will contact you with the results  Follow a low-salt diet  Continue with regular exercise  Stay well-hydrated, try to drink 6-8 glasses of water in a day  I would like to see you back in the office in 1 year with repeat labs  If you continue with urinary problems recommend to see your urologist for further evaluation  HPI: Benjamin Mai is a 34 y o  male who is here for Follow-up    Last time was 10/2018, today returns for year follow up with his mother  Since our last visit, he went to [facility] EGD on April 19 due to abdominal pain and headaches and was eventually discharged home  Today in generally feeling okay, though he states that he is feeling more tired and experiences dizziness over the last month  Patient denies any chest pain, shortness of breath and swelling  Denies urinary problems, appetite is OK, weight increased 8 lbs over last year  Only takes Tylenol for pain  Does not smoke  ROS:   All the systems were reviewed and were negative except as documented on the HPI      Allergies: Sulfa antibiotics    Medications:   Current Outpatient Medications:     acetaminophen (TYLENOL) 325 mg tablet, Take 650 mg by mouth every 6 (six) hours as needed for mild pain , Disp: , Rfl:     Cholecalciferol (VITAMIN D PO), Take 4,000 Units by mouth daily  , Disp: , Rfl:     fluticasone (FLONASE) 50 mcg/act nasal spray, 2 sprays into each nostril daily , Disp: , Rfl:     Past Medical History:   Diagnosis Date    Renal failure      Past Surgical History:   Procedure Laterality Date    TONSILLECTOMY AND ADENOIDECTOMY       Family History Family history unknown: Yes      reports that he has never smoked  He has never used smokeless tobacco  He reports that he does not drink alcohol or use drugs  Physical Exam:   Vitals:    10/25/19 1456   BP: 124/76   BP Location: Left arm   Patient Position: Sitting   Pulse: 74   Weight: 86 5 kg (190 lb 9 6 oz)   Height: 5' 7" (1 702 m)     Body mass index is 29 85 kg/m²  General: cooperative, in not acute distress  Eyes: conjunctivae pink, anicteric sclerae  ENT: lips and mucous membranes moist  Neck: supple, no JVD  Chest: clear breath sounds bilateral, no crackles, ronchus or wheezings  CVS: distinct S1 & S2, normal rate, regular rhythm  Abdomen: soft, non-tender, non-distended, normoactive bowel sounds  Back: no CVA tenderness  Extremities: no edema of both legs  Skin: no rash  Neuro: awake, alert, oriented            Portions of the record may have been created with voice recognition software  Occasional wrong word or "sound a like" substitutions may have occurred due to the inherent limitations of voice recognition software  Read the chart carefully and recognize, using context, where substitutions have occurred  If you have any questions, please contact the dictating provider

## 2019-10-25 NOTE — PATIENT INSTRUCTIONS
I want you to have repeat blood and urine test, will contact you with the results  Follow a low-salt diet  Continue with regular exercise  Stay well-hydrated, try to drink 6-8 glasses of water in a day  I would like to see you back in the office in 1 year with repeat labs  If you continue with urinary problems recommend to see your urologist for further evaluation

## 2019-10-25 NOTE — PROGRESS NOTES
OFFICE FOLLOW UP - Nephrology   Sonny Kirkland 34 y o  male MRN: [id number]       ASSESSMENT and PLAN:  Laz Oneill was seen today for follow-up  Diagnoses and all orders for this visit:    Stage 3 chronic kidney disease ([address] )  -     Renal function panel; Future  -     PTH, intact; Future  -     Protein / creatinine ratio, urine; Future    Hydronephrosis with ureteropelvic junction (UPJ) obstruction        This is a 80-year-old gentleman who returns to the office for CKD follow-up  1   Chronic kidney disease stage 3, previous creatinine around 1 3-1 6 since 2009 in the setting of chronic hydronephrosis and suspected solitary functional kidney  Most recent creatinine 1 31 on 04/19/2019  Patient with recently to have his blood test couple of weeks ago but I only received the results of the CBC, will contact labs to see if the renal function panel was done, if not patient will go to lab again to have the rest of the test previously ordered  If renal function remains stable I would like to see him 1 year  Once again advised about avoiding NSAIDs, to continue with healthy lifestyle, to do regular exercise and to keep a good weight  2   Chronic severe right hydronephrosis, follows with Urology  3   Hemoglobin normal   Last hemoglobin 14 7 on 09/20/2019    4  Mineral bone disease, follow results  5   Hemodynamics, blood pressure currently well controlled  6  Tiredness, dizziness, most recent hemoglobin normal, blood pressure today is stable  Will follow results of renal function panel, if renal function remains stable recommend to discuss with his primary care doctor for further investigation  Patient Instructions   I want you to have repeat blood and urine test, will contact you with the results  Follow a low-salt diet  Continue with regular exercise  Stay well-hydrated, try to drink 6-8 glasses of water in a day  I would like to see you back in the office in 1 year with repeat labs    If you continue with urinary problems recommend to see your urologist for further evaluation  HPI: Yves Garcia is a 34 y o  male who is here for Follow-up    Last time was 10/2018, today returns for year follow up with his mother  Since our last visit, he went to [facility] EGD on April 19 due to abdominal pain and headaches and was eventually discharged home  Today in generally feeling okay, though he states that he is feeling more tired and experiences dizziness over the last month  Patient denies any chest pain, shortness of breath and swelling  Denies urinary problems, appetite is OK, weight increased 8 lbs over last year  Only takes Tylenol for pain  Does not smoke  ROS:   All the systems were reviewed and were negative except as documented on the HPI  Allergies: Sulfa antibiotics    Medications:   Current Outpatient Medications:     acetaminophen (TYLENOL) 325 mg tablet, Take 650 mg by mouth every 6 (six) hours as needed for mild pain , Disp: , Rfl:     Cholecalciferol (VITAMIN D PO), Take 4,000 Units by mouth daily  , Disp: , Rfl:     fluticasone (FLONASE) 50 mcg/act nasal spray, 2 sprays into each nostril daily , Disp: , Rfl:     Past Medical History:   Diagnosis Date    Renal failure      Past Surgical History:   Procedure Laterality Date    TONSILLECTOMY AND ADENOIDECTOMY       Family History   Family history unknown: Yes      reports that he has never smoked  He has never used smokeless tobacco  He reports that he does not drink alcohol or use drugs  Physical Exam:   Vitals:    10/25/19 1456   BP: 124/76   BP Location: Left arm   Patient Position: Sitting   Pulse: 74   Weight: 86 5 kg (190 lb 9 6 oz)   Height: 5' 7" (1 702 m)     Body mass index is 29 85 kg/m²      General: cooperative, in not acute distress  Eyes: conjunctivae pink, anicteric sclerae  ENT: lips and mucous membranes moist  Neck: supple, no JVD  Chest: clear breath sounds bilateral, no crackles, ronchus or wheezings  CVS: distinct S1 & S2, normal rate, regular rhythm  Abdomen: soft, non-tender, non-distended, normoactive bowel sounds  Back: no CVA tenderness  Extremities: no edema of both legs  Skin: no rash  Neuro: awake, alert, oriented            Portions of the record may have been created with voice recognition software  Occasional wrong word or "sound a like" substitutions may have occurred due to the inherent limitations of voice recognition software  Read the chart carefully and recognize, using context, where substitutions have occurred  If you have any questions, please contact the dictating provider

## 2019-10-26 ENCOUNTER — APPOINTMENT (OUTPATIENT)
Dept: LAB | Facility: HOSPITAL | Age: 34
End: 2019-10-26
Attending: INTERNAL MEDICINE
Payer: MEDICARE

## 2019-10-26 DIAGNOSIS — N18.30 STAGE 3 CHRONIC KIDNEY DISEASE (HCC): ICD-10-CM

## 2019-10-26 LAB
ALBUMIN SERPL BCP-MCNC: 4.2 G/DL (ref 3.5–5)
ANION GAP SERPL CALCULATED.3IONS-SCNC: 5 MMOL/L (ref 4–13)
BUN SERPL-MCNC: 13 MG/DL (ref 5–25)
CALCIUM SERPL-MCNC: 9.1 MG/DL (ref 8.3–10.1)
CHLORIDE SERPL-SCNC: 107 MMOL/L (ref 100–108)
CO2 SERPL-SCNC: 27 MMOL/L (ref 21–32)
CREAT SERPL-MCNC: 1.32 MG/DL (ref 0.6–1.3)
CREAT UR-MCNC: 215 MG/DL
GFR SERPL CREATININE-BSD FRML MDRD: 71 ML/MIN/1.73SQ M
GLUCOSE P FAST SERPL-MCNC: 85 MG/DL (ref 65–99)
PHOSPHATE SERPL-MCNC: 2.6 MG/DL (ref 2.7–4.5)
POTASSIUM SERPL-SCNC: 4 MMOL/L (ref 3.5–5.3)
PROT UR-MCNC: 14 MG/DL
PROT/CREAT UR: 0.07 MG/G{CREAT} (ref 0–0.1)
PTH-INTACT SERPL-MCNC: 49.9 PG/ML (ref 18.4–80.1)
SODIUM SERPL-SCNC: 139 MMOL/L (ref 136–145)

## 2019-10-26 PROCEDURE — 83970 ASSAY OF PARATHORMONE: CPT

## 2019-10-26 PROCEDURE — 82570 ASSAY OF URINE CREATININE: CPT

## 2019-10-26 PROCEDURE — 84156 ASSAY OF PROTEIN URINE: CPT

## 2019-10-26 PROCEDURE — 36415 COLL VENOUS BLD VENIPUNCTURE: CPT

## 2019-10-26 PROCEDURE — 80069 RENAL FUNCTION PANEL: CPT

## 2019-10-28 ENCOUNTER — TELEPHONE (OUTPATIENT)
Dept: NEPHROLOGY | Facility: CLINIC | Age: 34
End: 2019-10-28

## 2019-10-28 DIAGNOSIS — N18.30 STAGE 3 CHRONIC KIDNEY DISEASE (HCC): Primary | ICD-10-CM

## 2019-10-28 NOTE — TELEPHONE ENCOUNTER
Recent blood test reviewed, renal function is stable with serum creatinine 1 3, normal electrolytes, PTH normal, no significant proteinuria  Have called patient and left a message on his cell phone, I have also called patient's mother number and left a message  Renal function is stable, follow up in 1 year repeat labs, orders placed (CBC, renal function panel, PTH and UPC)  Please make sure patient and his mother called my message and mail orders for labs in 1 year    Thanks,

## 2020-07-13 ENCOUNTER — TELEPHONE (OUTPATIENT)
Dept: NEPHROLOGY | Facility: CLINIC | Age: 35
End: 2020-07-13

## 2020-07-13 NOTE — TELEPHONE ENCOUNTER
Patient sees Taurus Anderson  He is calling to inform office that his insurance has been changed to [place]  I did advise him that the office does not participate with the insurance  He needs to contact his PCP and see if they are able to get out of network Authorizations so he can continue to see Roselyn Diaz  I told him he is not due to be seen until October so he does have some time but to find out as of now just incase he needs to transfer care to another  participating nephrologist in the area  Patient understood and agreed to call back if any questions

## 2020-09-08 ENCOUNTER — TELEPHONE (OUTPATIENT)
Dept: NEPHROLOGY | Facility: CLINIC | Age: 35
End: 2020-09-08

## 2020-09-08 NOTE — TELEPHONE ENCOUNTER
Left message to schedule pt's follow up with Dr Leonardo Bush   He can be schedule with stas or Chrissy Duenas

## 2020-09-09 NOTE — TELEPHONE ENCOUNTER
Left a message to schedule pt's follow up with Dr Tiburcio Kurtz   He can be scheduled with Hermelindo Penaloza or Hola Call

## 2020-10-27 ENCOUNTER — TELEPHONE (OUTPATIENT)
Dept: NEPHROLOGY | Facility: CLINIC | Age: 35
End: 2020-10-27

## 2020-10-29 ENCOUNTER — TELEPHONE (OUTPATIENT)
Dept: NEPHROLOGY | Facility: CLINIC | Age: 35
End: 2020-10-29

## 2020-10-29 LAB
CREAT ?TM UR-SCNC: 115 UMOL/L
EXT PROTEIN URINE: 7.4
PROT/CREAT UR: 0.06 MG/G{CREAT}

## 2020-11-02 ENCOUNTER — TELEPHONE (OUTPATIENT)
Dept: NEPHROLOGY | Facility: CLINIC | Age: 35
End: 2020-11-02

## 2020-11-03 ENCOUNTER — OFFICE VISIT (OUTPATIENT)
Dept: NEPHROLOGY | Facility: CLINIC | Age: 35
End: 2020-11-03
Payer: COMMERCIAL

## 2020-11-03 VITALS
WEIGHT: 187.6 LBS | HEART RATE: 78 BPM | TEMPERATURE: 98.8 F | RESPIRATION RATE: 18 BRPM | DIASTOLIC BLOOD PRESSURE: 80 MMHG | HEIGHT: 67 IN | SYSTOLIC BLOOD PRESSURE: 129 MMHG | BODY MASS INDEX: 29.44 KG/M2

## 2020-11-03 DIAGNOSIS — N18.31 STAGE 3A CHRONIC KIDNEY DISEASE (HCC): Primary | ICD-10-CM

## 2020-11-03 DIAGNOSIS — Q62.11 HYDRONEPHROSIS WITH URETEROPELVIC JUNCTION (UPJ) OBSTRUCTION: ICD-10-CM

## 2020-11-03 PROCEDURE — 99214 OFFICE O/P EST MOD 30 MIN: CPT | Performed by: INTERNAL MEDICINE

## 2021-03-19 ENCOUNTER — TELEPHONE (OUTPATIENT)
Dept: NEPHROLOGY | Facility: CLINIC | Age: 36
End: 2021-03-19

## 2021-03-19 NOTE — TELEPHONE ENCOUNTER
Patient called asking to speak to you and only you regarding an important matter  I informed the patient that you currently were not in the office but that I would relay the message   Patient can be reached at [phone]

## 2021-03-22 NOTE — TELEPHONE ENCOUNTER
I called patient twice to number below, no answer, left a message  Spoke with patient at 3:15 a m  He was recently found to have COVID-19 at [facility], most recent blood test on 03/11 showed a stable kidney function with a creatinine 1 4  Advised patient to stay well-hydrated and to only take Tylenol as needed for pain

## 2021-03-30 ENCOUNTER — TELEPHONE (OUTPATIENT)
Dept: NEPHROLOGY | Facility: CLINIC | Age: 36
End: 2021-03-30

## 2021-03-30 NOTE — TELEPHONE ENCOUNTER
Patient called the office asking if you could please give him a call  All that he would tell me was it was an insurance question

## 2021-03-31 NOTE — TELEPHONE ENCOUNTER
Spoke with patient  He wanted to know if Dr Heaven Mckeon accepted [facility], Medicare and Access health insurance  I did make pt aware that our office does accept the insurances in question  I have also made pt aware that in the future any insurance inquiries can be addressed by our staff to avoid a delay in communication  Pt has verbalized understanding and has no other questions at this time

## 2021-08-02 ENCOUNTER — TELEPHONE (OUTPATIENT)
Dept: NEPHROLOGY | Facility: CLINIC | Age: 36
End: 2021-08-02

## 2021-08-19 ENCOUNTER — TELEPHONE (OUTPATIENT)
Dept: NEPHROLOGY | Facility: CLINIC | Age: 36
End: 2021-08-19

## 2021-08-19 NOTE — TELEPHONE ENCOUNTER
Received a call from patient stating he was recently prescribed an inhaler and would like to know if it was medically okay for him to use  Please advise       Patient call back number is [phone]

## 2021-08-19 NOTE — TELEPHONE ENCOUNTER
Patient has been advised that it is OK to use an inhaler  He has also been notified that he is due to follow up in November

## 2021-11-02 NOTE — TELEPHONE ENCOUNTER
Patient walked in to schedule October follow up appointment and I scheduled for 10/25/19 and handed him an appointment card with the date,time, and location  normal...

## 2021-11-09 ENCOUNTER — APPOINTMENT (OUTPATIENT)
Dept: LAB | Facility: HOSPITAL | Age: 36
End: 2021-11-09
Attending: INTERNAL MEDICINE
Payer: COMMERCIAL

## 2021-11-09 DIAGNOSIS — N18.31 STAGE 3A CHRONIC KIDNEY DISEASE (HCC): ICD-10-CM

## 2021-11-09 LAB
ALBUMIN SERPL BCP-MCNC: 4.2 G/DL (ref 3.5–5)
ANION GAP SERPL CALCULATED.3IONS-SCNC: 11 MMOL/L (ref 4–13)
BUN SERPL-MCNC: 16 MG/DL (ref 5–25)
CALCIUM SERPL-MCNC: 9.7 MG/DL (ref 8.3–10.1)
CHLORIDE SERPL-SCNC: 104 MMOL/L (ref 100–108)
CO2 SERPL-SCNC: 26 MMOL/L (ref 21–32)
CREAT SERPL-MCNC: 1.16 MG/DL (ref 0.6–1.3)
CREAT UR-MCNC: 71.6 MG/DL
ERYTHROCYTE [DISTWIDTH] IN BLOOD BY AUTOMATED COUNT: 13.2 % (ref 11.6–15.1)
GFR SERPL CREATININE-BSD FRML MDRD: 82 ML/MIN/1.73SQ M
GLUCOSE SERPL-MCNC: 86 MG/DL (ref 65–140)
HCT VFR BLD AUTO: 45.8 % (ref 36.5–49.3)
HGB BLD-MCNC: 14.7 G/DL (ref 12–17)
MCH RBC QN AUTO: 29.6 PG (ref 26.8–34.3)
MCHC RBC AUTO-ENTMCNC: 32.1 G/DL (ref 31.4–37.4)
MCV RBC AUTO: 92 FL (ref 82–98)
PHOSPHATE SERPL-MCNC: 3.6 MG/DL (ref 2.7–4.5)
PLATELET # BLD AUTO: 308 THOUSANDS/UL (ref 149–390)
PMV BLD AUTO: 10.3 FL (ref 8.9–12.7)
POTASSIUM SERPL-SCNC: 4.1 MMOL/L (ref 3.5–5.3)
PROT UR-MCNC: 7 MG/DL
PROT/CREAT UR: 0.1 MG/G{CREAT} (ref 0–0.1)
PTH-INTACT SERPL-MCNC: 39.5 PG/ML (ref 18.4–80.1)
RBC # BLD AUTO: 4.97 MILLION/UL (ref 3.88–5.62)
SODIUM SERPL-SCNC: 141 MMOL/L (ref 136–145)
WBC # BLD AUTO: 6.47 THOUSAND/UL (ref 4.31–10.16)

## 2021-11-09 PROCEDURE — 82570 ASSAY OF URINE CREATININE: CPT

## 2021-11-09 PROCEDURE — 36415 COLL VENOUS BLD VENIPUNCTURE: CPT

## 2021-11-09 PROCEDURE — 80069 RENAL FUNCTION PANEL: CPT

## 2021-11-09 PROCEDURE — 84156 ASSAY OF PROTEIN URINE: CPT

## 2021-11-09 PROCEDURE — 85027 COMPLETE CBC AUTOMATED: CPT

## 2021-11-09 PROCEDURE — 83970 ASSAY OF PARATHORMONE: CPT

## 2021-11-19 ENCOUNTER — OFFICE VISIT (OUTPATIENT)
Dept: NEPHROLOGY | Facility: CLINIC | Age: 36
End: 2021-11-19
Payer: COMMERCIAL

## 2021-11-19 VITALS
WEIGHT: 182 LBS | HEIGHT: 67 IN | HEART RATE: 74 BPM | SYSTOLIC BLOOD PRESSURE: 138 MMHG | DIASTOLIC BLOOD PRESSURE: 62 MMHG | BODY MASS INDEX: 28.56 KG/M2

## 2021-11-19 DIAGNOSIS — Q62.11 HYDRONEPHROSIS WITH URETEROPELVIC JUNCTION (UPJ) OBSTRUCTION: ICD-10-CM

## 2021-11-19 DIAGNOSIS — N18.31 STAGE 3A CHRONIC KIDNEY DISEASE (HCC): Primary | ICD-10-CM

## 2021-11-19 PROCEDURE — 99214 OFFICE O/P EST MOD 30 MIN: CPT | Performed by: INTERNAL MEDICINE

## 2021-12-17 ENCOUNTER — TELEPHONE (OUTPATIENT)
Dept: NEPHROLOGY | Facility: CLINIC | Age: 36
End: 2021-12-17

## 2022-05-19 ENCOUNTER — TELEPHONE (OUTPATIENT)
Dept: NEPHROLOGY | Facility: CLINIC | Age: 37
End: 2022-05-19

## 2022-05-19 NOTE — TELEPHONE ENCOUNTER
Patient stopped into office to find out when and where his appointment is with Dr Veronica Davis, I gave patient appt reminder card with his information on it

## 2022-05-25 ENCOUNTER — TELEPHONE (OUTPATIENT)
Dept: NEPHROLOGY | Facility: HOSPITAL | Age: 37
End: 2022-05-25

## 2022-05-25 NOTE — TELEPHONE ENCOUNTER
Called patient and left a voicemail asking to please have labs drawn before the follow up appointment

## 2022-05-27 ENCOUNTER — TELEPHONE (OUTPATIENT)
Dept: NEPHROLOGY | Facility: CLINIC | Age: 37
End: 2022-05-27

## 2022-05-27 ENCOUNTER — APPOINTMENT (OUTPATIENT)
Dept: LAB | Facility: HOSPITAL | Age: 37
End: 2022-05-27
Attending: INTERNAL MEDICINE
Payer: COMMERCIAL

## 2022-05-27 DIAGNOSIS — N18.31 STAGE 3A CHRONIC KIDNEY DISEASE (HCC): ICD-10-CM

## 2022-05-27 LAB
ALBUMIN SERPL BCP-MCNC: 4.2 G/DL (ref 3.5–5)
ANION GAP SERPL CALCULATED.3IONS-SCNC: 1 MMOL/L (ref 4–13)
BUN SERPL-MCNC: 19 MG/DL (ref 5–25)
CALCIUM SERPL-MCNC: 10.2 MG/DL (ref 8.3–10.1)
CHLORIDE SERPL-SCNC: 107 MMOL/L (ref 100–108)
CO2 SERPL-SCNC: 29 MMOL/L (ref 21–32)
CREAT SERPL-MCNC: 1.41 MG/DL (ref 0.6–1.3)
CREAT UR-MCNC: 169 MG/DL
ERYTHROCYTE [DISTWIDTH] IN BLOOD BY AUTOMATED COUNT: 13 % (ref 11.6–15.1)
GFR SERPL CREATININE-BSD FRML MDRD: 64 ML/MIN/1.73SQ M
GLUCOSE SERPL-MCNC: 87 MG/DL (ref 65–140)
HCT VFR BLD AUTO: 47.8 % (ref 36.5–49.3)
HGB BLD-MCNC: 15.7 G/DL (ref 12–17)
MCH RBC QN AUTO: 30.1 PG (ref 26.8–34.3)
MCHC RBC AUTO-ENTMCNC: 32.8 G/DL (ref 31.4–37.4)
MCV RBC AUTO: 92 FL (ref 82–98)
PHOSPHATE SERPL-MCNC: 3 MG/DL (ref 2.7–4.5)
PLATELET # BLD AUTO: 278 THOUSANDS/UL (ref 149–390)
PMV BLD AUTO: 10.2 FL (ref 8.9–12.7)
POTASSIUM SERPL-SCNC: 4.3 MMOL/L (ref 3.5–5.3)
PROT UR-MCNC: 10 MG/DL
PROT/CREAT UR: 0.06 MG/G{CREAT} (ref 0–0.1)
PTH-INTACT SERPL-MCNC: 41.6 PG/ML (ref 18.4–80.1)
RBC # BLD AUTO: 5.22 MILLION/UL (ref 3.88–5.62)
SODIUM SERPL-SCNC: 137 MMOL/L (ref 136–145)
WBC # BLD AUTO: 5.66 THOUSAND/UL (ref 4.31–10.16)

## 2022-05-27 PROCEDURE — 36415 COLL VENOUS BLD VENIPUNCTURE: CPT

## 2022-05-27 PROCEDURE — 83970 ASSAY OF PARATHORMONE: CPT

## 2022-05-27 PROCEDURE — 82570 ASSAY OF URINE CREATININE: CPT

## 2022-05-27 PROCEDURE — 84156 ASSAY OF PROTEIN URINE: CPT

## 2022-05-27 PROCEDURE — 85027 COMPLETE CBC AUTOMATED: CPT

## 2022-05-27 PROCEDURE — 80069 RENAL FUNCTION PANEL: CPT

## 2022-05-27 NOTE — TELEPHONE ENCOUNTER
Appointment Confirmation   Person confirmed appointment with  If not patient, name of the person Patient    Date and time of appointment 5/31 11:30   Patient acknowledged and will be at appointment? yes    Did you advise the patient that they will need a urine sample if they are a new patient?  N/A    Did you advise the patient to bring their current medications for verification? (including any OTC) Yes    Additional Information

## 2022-05-31 ENCOUNTER — OFFICE VISIT (OUTPATIENT)
Dept: NEPHROLOGY | Facility: CLINIC | Age: 37
End: 2022-05-31
Payer: COMMERCIAL

## 2022-05-31 VITALS
OXYGEN SATURATION: 97 % | HEART RATE: 85 BPM | DIASTOLIC BLOOD PRESSURE: 80 MMHG | BODY MASS INDEX: 28.44 KG/M2 | HEIGHT: 67 IN | SYSTOLIC BLOOD PRESSURE: 122 MMHG | WEIGHT: 181.2 LBS

## 2022-05-31 DIAGNOSIS — N18.31 STAGE 3A CHRONIC KIDNEY DISEASE (HCC): Primary | ICD-10-CM

## 2022-05-31 DIAGNOSIS — Q62.11 HYDRONEPHROSIS WITH URETEROPELVIC JUNCTION (UPJ) OBSTRUCTION: ICD-10-CM

## 2022-05-31 PROCEDURE — 99214 OFFICE O/P EST MOD 30 MIN: CPT | Performed by: INTERNAL MEDICINE

## 2022-05-31 RX ORDER — ALBUTEROL SULFATE 90 UG/1
2 AEROSOL, METERED RESPIRATORY (INHALATION) EVERY 6 HOURS PRN
COMMUNITY

## 2022-05-31 NOTE — PATIENT INSTRUCTIONS
As we discussed in the office visit, very important to stay well-hydrated, follow low-salt diet, eat healthy and stay physically active  Avoid NSAIDs (no ibuprofen, Motrin, Advil, Aleve, naproxen)  Okay to take Tylenol or paracetamol or acetaminophen as needed for pain  Continue close follow-up primary care doctor  Referral to Urology placed for further recommendation given chronic right hydronephrosis    Would like to see you back in 6 months as instructed before

## 2022-05-31 NOTE — PROGRESS NOTES
OFFICE FOLLOW UP - Nephrology   Jose Gonzalez 31 y o  male MRN: [id number]       ASSESSMENT and PLAN:  Domo Frye was seen today for follow-up and chronic kidney disease  Diagnoses and all orders for this visit:    Stage 3a chronic kidney disease ([address] )  -     Ambulatory Referral to Urology; Future    Hydronephrosis with ureteropelvic junction (UPJ) obstruction  -     Ambulatory Referral to Urology; Future        This is a 31year-old gentleman who returns to the office for year CKD follow-up  1   Chronic kidney disease stage 2/3A, previous creatinine around 1 3-1 6 since 2009 in the setting of chronic severe right hydronephrosis and suspected solitary functional kidney  Kidney function is stable for the last 4 years with a most recent creatinine of 1 41  Once again discussed about importance to follow low-salt diet  Follow healthy lifestyle, stay well-hydrated, keep a good weight, avoid NSAIDs  Plan to follow-up in 6 months with repeat labs as ordered before    2  Chronic severe right hydronephrosis, previously followed with Urology with Dr Yony Villagomez  Patient's mother requesting to see a urologist for further recommendations to make sure there is no any other alternative treatment at this moment  Discussed with her the given chronicity I suspect at this moment will be test necessary to continue with close follow-up  Referral placed to Urology as per mother's request    3  Hemoglobin normal  Last hemoglobin 15 7    4  Mineral bone disease, most recent phosphorus and PTH within normal limits  5   Hemodynamics, blood pressure currently well controlled  Continue with low-salt diet        Patient Instructions   As we discussed in the office visit, very important to stay well-hydrated, follow low-salt diet, eat healthy and stay physically active  Avoid NSAIDs (no ibuprofen, Motrin, Advil, Aleve, naproxen)  Okay to take Tylenol or paracetamol or acetaminophen as needed for pain    Continue close follow-up primary care doctor  Referral to Urology placed for further recommendation given chronic right hydronephrosis  Would like to see you back in 6 months as instructed before        HPI: Frida Zepeda is a 31 y o  male who is here for Follow-up (Dx Stage 3a chronic kidney disease ) and Chronic Kidney Disease    Since our last visit, he got COVID-19 on March 2021, did not require hospitalization  After that he received COVID-19 vaccine     The last time seen was in November/2021, today returns for follow-up with his mother  Patient in general is doing well, no significant complaints  He drinking a lot of water and trying to stay healthy  Patient's mother is concerned regarding history of right-sided hydronephrosis, she would like to know if there is any other alternative treatment at this moment  Patient denies any chest pain, shortness of breath or leg swelling  Denies any abdominal pain, no recent nausea, no vomiting, no diarrhea or constipation  Denies urinary problems,, no burning sensation, no gross hematuria  Appetite is OK, weight decreased 1 lbs over last 6 months  Only takes Tylenol for pain  Denies tobacco abuse    Recent blood test were done on 05/27/2022, results were reviewed with patient, most recent creatinine 1 41 with an estimated GFR of 64, no proteinuria      ROS: All the systems were reviewed and were negative except as documented on the H&P            Allergies: Sulfa antibiotics    Medications:   Current Outpatient Medications:     acetaminophen (TYLENOL) 325 mg tablet, Take 650 mg by mouth every 6 (six) hours as needed for mild pain, Disp: , Rfl:     albuterol (PROVENTIL HFA,VENTOLIN HFA) 90 mcg/act inhaler, Inhale 2 puffs every 6 (six) hours as needed for wheezing, Disp: , Rfl:     Cholecalciferol (VITAMIN D PO), Take 5,000 Units by mouth daily , Disp: , Rfl:     fluticasone (FLONASE) 50 mcg/act nasal spray, 2 sprays into each nostril daily  (Patient not taking: Reported on 11/19/2021 ), Disp: , Rfl:     Past Medical History:   Diagnosis Date    Renal failure      Past Surgical History:   Procedure Laterality Date    TONSILLECTOMY AND ADENOIDECTOMY       Family History   Family history unknown: Yes      reports that he has never smoked  He has never used smokeless tobacco  He reports that he does not drink alcohol and does not use drugs  Physical Exam:   Vitals:    05/31/22 1133   BP: 122/80   BP Location: Left arm   Patient Position: Sitting   Cuff Size: Adult   Pulse: 85   SpO2: 97%   Weight: 82 2 kg (181 lb 3 2 oz)   Height: 5' 7" (1 702 m)     Body mass index is 28 38 kg/m²      General: conscious, cooperative, in not acute distress  Eyes: conjunctivae pink, anicteric sclerae  ENT: lips and mucous membranes moist  Neck: supple, no JVD  Chest: clear breath sounds bilateral, no crackles, ronchus or wheezings  CVS: distinct S1 & S2, normal rate, regular rhythm  Abdomen: soft, non-tender, non-distended, normoactive bowel sounds  Back: no CVA tenderness  Extremities: no edema of both legs  Skin: no rash  Neuro: awake, alert, oriented      Lab Results:   Results from last 7 days   Lab Units 05/27/22  0733   WBC Thousand/uL 5 66   HEMOGLOBIN g/dL 15 7   HEMATOCRIT % 47 8   PLATELETS Thousands/uL 278   POTASSIUM mmol/L 4 3   CHLORIDE mmol/L 107   CO2 mmol/L 29   BUN mg/dL 19   CREATININE mg/dL 1 41*   CALCIUM mg/dL 10 2*   PHOSPHORUS mg/dL 3 0       Laboratory Results:  Lab Results   Component Value Date    WBC 5 66 05/27/2022    HGB 15 7 05/27/2022    HCT 47 8 05/27/2022    MCV 92 05/27/2022     05/27/2022     Lab Results   Component Value Date    SODIUM 137 05/27/2022    K 4 3 05/27/2022     05/27/2022    CO2 29 05/27/2022    BUN 19 05/27/2022    CREATININE 1 41 (H) 05/27/2022    GLUC 87 05/27/2022    CALCIUM 10 2 (H) 05/27/2022       Lab Results   Component Value Date    PTH 41 6 05/27/2022    CALCIUM 10 2 (H) 05/27/2022    PHOS 3 0 05/27/2022 Portions of the record may have been created with voice recognition software  Occasional wrong word or "sound a like" substitutions may have occurred due to the inherent limitations of voice recognition software  Read the chart carefully and recognize, using context, where substitutions have occurred  If you have any questions, please contact the dictating provider

## 2022-07-12 ENCOUNTER — TELEPHONE (OUTPATIENT)
Dept: NEPHROLOGY | Facility: CLINIC | Age: 37
End: 2022-07-12

## 2022-07-12 NOTE — TELEPHONE ENCOUNTER
Received a call from patient requesting a call back from Dr Hunter Miller regarding his appointment that he scheduled with Urology

## 2022-07-14 ENCOUNTER — TELEPHONE (OUTPATIENT)
Dept: UROLOGY | Facility: AMBULATORY SURGERY CENTER | Age: 37
End: 2022-07-14

## 2022-07-14 NOTE — TELEPHONE ENCOUNTER
Pt 's appt on 7/21/2022 at 8:00 AM needs to be rescheduled due to a change in Dr Manisha Villanueva schedule  I will send message to front end staff to call pt

## 2022-07-15 NOTE — TELEPHONE ENCOUNTER
LM to inform Patient of change of appointment  Cancelled 7/21 as per Fely Brown note  R/S 8/16 @ 2PM  Provided # for return call

## 2022-07-20 ENCOUNTER — TELEPHONE (OUTPATIENT)
Dept: NEPHROLOGY | Facility: CLINIC | Age: 37
End: 2022-07-20

## 2022-07-20 NOTE — TELEPHONE ENCOUNTER
rec'd phone call from patient who asked that a message be relayed to Dr Myra Bojorquez  He was initially scheduled with Dr Prashanth Rodriguez (Urology) 7/9/22 and they called to reschedule the appointment to 8/16  Pt wanted us aware that the appointment was pushed out

## 2022-08-16 ENCOUNTER — OFFICE VISIT (OUTPATIENT)
Dept: UROLOGY | Facility: AMBULATORY SURGERY CENTER | Age: 37
End: 2022-08-16
Payer: COMMERCIAL

## 2022-08-16 VITALS
DIASTOLIC BLOOD PRESSURE: 86 MMHG | WEIGHT: 184 LBS | SYSTOLIC BLOOD PRESSURE: 116 MMHG | OXYGEN SATURATION: 98 % | BODY MASS INDEX: 28.82 KG/M2 | HEART RATE: 77 BPM

## 2022-08-16 DIAGNOSIS — Q62.39 UPJ OBSTRUCTION, CONGENITAL: Primary | ICD-10-CM

## 2022-08-16 PROCEDURE — 99204 OFFICE O/P NEW MOD 45 MIN: CPT | Performed by: UROLOGY

## 2022-08-16 RX ORDER — MELATONIN
2000 DAILY
COMMUNITY

## 2022-08-16 NOTE — PROGRESS NOTES
8/16/2022    Vinay Claire  1985  [id number]        Assessment  History of right UPJ obstruction with cortical thinning, CKD      Discussion  I recommend a CT stone study to assess the degree of right-sided hydronephrosis and cortical thinning  Would hold on IV contrast based on his chronic kidney disease  I also recommend obtaining a Mag 3 renal scan with Lasix to see his split function and degree of obstruction with Lasix washout  He will return in follow-up after the above studies have been completed  History of Present Illness  31 y o  male with a history of right UPJ obstruction with cortical thinning previously known to Dr Anisha Zepeda  He was last seen in 2018  His last imaging showed severe right-sided hydronephrosis with cortical thinning in July of 2017  His mother is present with him in the office today and provides much of the history  He denies any flank pain at this time  The mother states that many years ago Dr Anisha Zepeda had recommended surgery for his UPJ obstruction but that this was never performed  AUA Symptom Score      Review of Systems  Review of Systems   Constitutional: Negative  HENT: Negative  Eyes: Negative  Respiratory: Negative  Cardiovascular: Negative  Gastrointestinal: Negative  Endocrine: Negative  Genitourinary:        Per HPI   Musculoskeletal: Negative  Skin: Negative  Allergic/Immunologic: Negative  Neurological: Negative  Hematological: Negative  Psychiatric/Behavioral: Negative            Past Medical History  Past Medical History:   Diagnosis Date    Renal failure        Past Social History  Past Surgical History:   Procedure Laterality Date    TONSILLECTOMY AND ADENOIDECTOMY         Past Family History  Family History   Family history unknown: Yes       Past Social history  Social History     Socioeconomic History    Marital status: Single     Spouse name: Not on file    Number of children: Not on file    Years of education: Not on file    Highest education level: Not on file   Occupational History    Not on file   Tobacco Use    Smoking status: Never Smoker    Smokeless tobacco: Never Used   Substance and Sexual Activity    Alcohol use: No    Drug use: No    Sexual activity: Not on file   Other Topics Concern    Not on file   Social History Narrative    Not on file     Social Determinants of Health     Financial Resource Strain: Not on file   Food Insecurity: Not on file   Transportation Needs: Not on file   Physical Activity: Not on file   Stress: Not on file   Social Connections: Not on file   Intimate Partner Violence: Not on file   Housing Stability: Not on file       Current Medications  Current Outpatient Medications   Medication Sig Dispense Refill    acetaminophen (TYLENOL) 325 mg tablet Take 650 mg by mouth every 6 (six) hours as needed for mild pain      albuterol (PROVENTIL HFA,VENTOLIN HFA) 90 mcg/act inhaler Inhale 2 puffs every 6 (six) hours as needed for wheezing      Cholecalciferol (VITAMIN D PO) Take 5,000 Units by mouth daily       cholecalciferol (VITAMIN D3) 1,000 units tablet Take 2,000 Units by mouth daily      fluticasone (FLONASE) 50 mcg/act nasal spray 2 sprays into each nostril daily  (Patient not taking: Reported on 11/19/2021 )       No current facility-administered medications for this visit  Allergies  Allergies   Allergen Reactions    Sulfa Antibiotics        Past Medical History, Social History, Family History, medications and allergies were reviewed  Vitals  Vitals:    08/16/22 1411   BP: 116/86   BP Location: Left arm   Patient Position: Sitting   Cuff Size: Adult   Pulse: 77   SpO2: 98%   Weight: 83 5 kg (184 lb)       Physical Exam  Physical Exam    On examination he is in no acute distress  His abdomen is soft nontender nondistended  The bladder is nonpalpable   examination reveals no CVA tenderness  Skin is warm  Extremities without edema    Neurologic is grossly intact and nonfocal   Gait normal   Affect normal      Results  No results found for: PSA  Lab Results   Component Value Date    GLUCOSE 87 08/01/2015    CALCIUM 10 2 (H) 05/27/2022     08/01/2015    K 4 3 05/27/2022    CO2 29 05/27/2022     05/27/2022    BUN 19 05/27/2022    CREATININE 1 41 (H) 05/27/2022     Lab Results   Component Value Date    WBC 5 66 05/27/2022    HGB 15 7 05/27/2022    HCT 47 8 05/27/2022    MCV 92 05/27/2022     05/27/2022         Office Urine Dip  No results found for this or any previous visit (from the past 1 hour(s)) ]

## 2022-08-30 ENCOUNTER — HOSPITAL ENCOUNTER (OUTPATIENT)
Dept: NUCLEAR MEDICINE | Facility: HOSPITAL | Age: 37
Discharge: HOME | End: 2022-08-30
Attending: UROLOGY
Payer: COMMERCIAL

## 2022-08-30 DIAGNOSIS — Q62.39 UPJ OBSTRUCTION, CONGENITAL: ICD-10-CM

## 2022-08-30 PROCEDURE — G1004 CDSM NDSC: HCPCS

## 2022-08-30 PROCEDURE — A9562 TC99M MERTIATIDE: HCPCS

## 2022-08-30 PROCEDURE — 78707 K FLOW/FUNCT IMAGE W/O DRUG: CPT

## 2022-09-02 ENCOUNTER — TELEPHONE (OUTPATIENT)
Dept: NEPHROLOGY | Facility: CLINIC | Age: 37
End: 2022-09-02

## 2022-09-03 ENCOUNTER — HOSPITAL ENCOUNTER (OUTPATIENT)
Dept: RADIOLOGY | Facility: HOSPITAL | Age: 37
Discharge: HOME | End: 2022-09-03
Attending: UROLOGY
Payer: COMMERCIAL

## 2022-09-03 DIAGNOSIS — Q62.39 UPJ OBSTRUCTION, CONGENITAL: ICD-10-CM

## 2022-09-03 PROCEDURE — G1004 CDSM NDSC: HCPCS

## 2022-09-03 PROCEDURE — 74176 CT ABD & PELVIS W/O CONTRAST: CPT

## 2022-09-13 ENCOUNTER — TELEPHONE (OUTPATIENT)
Dept: UROLOGY | Facility: AMBULATORY SURGERY CENTER | Age: 37
End: 2022-09-13

## 2022-09-13 NOTE — TELEPHONE ENCOUNTER
LM for patient to see if he can come in 9/22 instead of 9/23 per Dr Shirlene Frederick  Any time in AM is fine

## 2022-09-14 NOTE — TELEPHONE ENCOUNTER
Pt called and stated he will take appt 9/22 @ 9:30am with Dr MORRIS @ [facility]   Pt does not have mychart set up so he is requesting a reminder sent to him     Pt call [phone]

## 2022-09-22 ENCOUNTER — OFFICE VISIT (OUTPATIENT)
Dept: UROLOGY | Facility: AMBULATORY SURGERY CENTER | Age: 37
End: 2022-09-22
Payer: COMMERCIAL

## 2022-09-22 VITALS
DIASTOLIC BLOOD PRESSURE: 82 MMHG | WEIGHT: 184 LBS | HEIGHT: 67 IN | BODY MASS INDEX: 28.88 KG/M2 | SYSTOLIC BLOOD PRESSURE: 128 MMHG

## 2022-09-22 DIAGNOSIS — Q62.11 HYDRONEPHROSIS WITH URETEROPELVIC JUNCTION (UPJ) OBSTRUCTION: Primary | Chronic | ICD-10-CM

## 2022-09-22 DIAGNOSIS — N18.31 STAGE 3A CHRONIC KIDNEY DISEASE (HCC): ICD-10-CM

## 2022-09-22 PROCEDURE — 99214 OFFICE O/P EST MOD 30 MIN: CPT | Performed by: UROLOGY

## 2022-09-22 RX ORDER — PREDNISONE 10 MG/1
TABLET ORAL AS NEEDED
COMMUNITY
Start: 2022-08-28

## 2022-09-22 NOTE — PROGRESS NOTES
9/22/2022    Vinay [place]  1985  [id number]      Assessment  -History of right UPJ obstruction with cortical thinning, CKD    Discussion/Plan  Vinay is a 31 y o  male being managed by Dr Patrick Friends  1  History of right UPJ obstruction with cortical thinning, CKD- we reviewed the results of his recent CT scan and Mag 3 renal scan which confirms chronic right-sided hydronephrosis and nonfunctioning right kidney  Left kidney unremarkable  He remains asymptomatic  Given his known nonfunctioning right kidney, would recommend observation  We will repeat a renal ultrasound in 1 year  Mother is requesting referral to dietary for recommendations on Nephrology nutrition  He will continue to closely follow with Nephrology  Patient was advised to call with any episodes of flank pain or symptoms of urinary tract infection  Follow-up in 1 year with renal ultrasound  He was advised to call sooner with any questions or issues     -All questions answered, patient and mother agrees with plan      History of Present Illness  31 y o  male with a history of chronic UPJ obstruction and CKD presents today for follow up  Patient accompanied today by his mother  He has a history of right UPJ obstruction with cortical thinning  Patient previously known to Dr Darryle Ishihara  He denies any lower urinary tract symptoms, gross hematuria, dysuria, or flank pain  He has had no recent urinary tract infections  Patient continues to closely follow with Nephrology  Review of Systems  Review of Systems   Constitutional: Negative  HENT: Negative  Respiratory: Negative  Cardiovascular: Negative  Gastrointestinal: Negative  Genitourinary: Negative for decreased urine volume, difficulty urinating, dysuria, flank pain, frequency, hematuria and urgency  Musculoskeletal: Negative  Skin: Negative  Neurological: Negative  Psychiatric/Behavioral: Negative          Past Medical History  Past Medical History: Diagnosis Date    Renal failure        Past Social History  Past Surgical History:   Procedure Laterality Date    TONSILLECTOMY AND ADENOIDECTOMY         Past Family History  Family History   Family history unknown: Yes       Past Social history  Social History     Socioeconomic History    Marital status: Single     Spouse name: Not on file    Number of children: Not on file    Years of education: Not on file    Highest education level: Not on file   Occupational History    Not on file   Tobacco Use    Smoking status: Never Smoker    Smokeless tobacco: Never Used   Substance and Sexual Activity    Alcohol use: No    Drug use: No    Sexual activity: Not on file   Other Topics Concern    Not on file   Social History Narrative    Not on file     Social Determinants of Health     Financial Resource Strain: Not on file   Food Insecurity: Not on file   Transportation Needs: Not on file   Physical Activity: Not on file   Stress: Not on file   Social Connections: Not on file   Intimate Partner Violence: Not on file   Housing Stability: Not on file       Current Medications  Current Outpatient Medications   Medication Sig Dispense Refill    acetaminophen (TYLENOL) 325 mg tablet Take 650 mg by mouth every 6 (six) hours as needed for mild pain      albuterol (PROVENTIL HFA,VENTOLIN HFA) 90 mcg/act inhaler Inhale 2 puffs every 6 (six) hours as needed for wheezing      Cholecalciferol (VITAMIN D PO) Take 5,000 Units by mouth daily       predniSONE 10 mg tablet if needed      cholecalciferol (VITAMIN D3) 1,000 units tablet Take 2,000 Units by mouth daily (Patient not taking: Reported on 9/22/2022)      fluticasone (FLONASE) 50 mcg/act nasal spray 2 sprays into each nostril daily  (Patient not taking: Reported on 11/19/2021 )       No current facility-administered medications for this visit         Allergies  Allergies   Allergen Reactions    Sulfa Antibiotics        Past Medical History, Social History, Family History, medications and allergies were reviewed  Vitals  Vitals:    09/22/22 0921   BP: 128/82   BP Location: Left arm   Patient Position: Sitting   Cuff Size: Adult   Weight: 83 5 kg (184 lb)   Height: 5' 7" (1 702 m)       Physical Exam  Physical Exam  Constitutional:       Appearance: Normal appearance  He is well-developed  HENT:      Head: Normocephalic  Eyes:      Pupils: Pupils are equal, round, and reactive to light  Pulmonary:      Effort: Pulmonary effort is normal    Abdominal:      Palpations: Abdomen is soft  Tenderness: There is no right CVA tenderness or left CVA tenderness  Musculoskeletal:         General: Normal range of motion  Cervical back: Normal range of motion  Skin:     General: Skin is warm and dry  Neurological:      General: No focal deficit present  Mental Status: He is alert and oriented to person, place, and time  Psychiatric:         Mood and Affect: Mood normal          Behavior: Behavior normal          Thought Content: Thought content normal          Judgment: Judgment normal          Results    I have personally reviewed all pertinent lab results and reviewed with patient  No results found for: PSA  Lab Results   Component Value Date    GLUCOSE 87 08/01/2015    CALCIUM 10 2 (H) 05/27/2022     08/01/2015    K 4 3 05/27/2022    CO2 29 05/27/2022     05/27/2022    BUN 19 05/27/2022    CREATININE 1 41 (H) 05/27/2022     Lab Results   Component Value Date    WBC 5 66 05/27/2022    HGB 15 7 05/27/2022    HCT 47 8 05/27/2022    MCV 92 05/27/2022     05/27/2022     No results found for this or any previous visit (from the past 1 hour(s))

## 2022-11-08 ENCOUNTER — TELEPHONE (OUTPATIENT)
Dept: NEPHROLOGY | Facility: CLINIC | Age: 37
End: 2022-11-08

## 2022-11-08 ENCOUNTER — TELEPHONE (OUTPATIENT)
Dept: UROLOGY | Facility: AMBULATORY SURGERY CENTER | Age: 37
End: 2022-11-08

## 2022-11-08 DIAGNOSIS — N18.31 STAGE 3A CHRONIC KIDNEY DISEASE (HCC): Primary | ICD-10-CM

## 2022-11-08 NOTE — TELEPHONE ENCOUNTER
Called patient to remained to please have blood and urine  work done before the follow up appointment

## 2022-11-09 ENCOUNTER — HOSPITAL ENCOUNTER (OUTPATIENT)
Dept: RADIOLOGY | Facility: HOSPITAL | Age: 37
Discharge: HOME | End: 2022-11-09

## 2022-11-09 DIAGNOSIS — Q62.11 HYDRONEPHROSIS WITH URETEROPELVIC JUNCTION (UPJ) OBSTRUCTION: Chronic | ICD-10-CM

## 2022-11-10 ENCOUNTER — TELEPHONE (OUTPATIENT)
Dept: NEPHROLOGY | Facility: CLINIC | Age: 37
End: 2022-11-10

## 2022-11-11 ENCOUNTER — APPOINTMENT (OUTPATIENT)
Dept: LAB | Facility: CLINIC | Age: 37
End: 2022-11-11

## 2022-11-11 LAB
ALBUMIN SERPL BCP-MCNC: 4.5 G/DL (ref 3.5–5)
ANION GAP SERPL CALCULATED.3IONS-SCNC: 5 MMOL/L (ref 4–13)
BUN SERPL-MCNC: 17 MG/DL (ref 5–25)
CALCIUM SERPL-MCNC: 10.1 MG/DL (ref 8.3–10.1)
CHLORIDE SERPL-SCNC: 105 MMOL/L (ref 96–108)
CO2 SERPL-SCNC: 26 MMOL/L (ref 21–32)
CREAT SERPL-MCNC: 1.17 MG/DL (ref 0.6–1.3)
CREAT UR-MCNC: <13 MG/DL
ERYTHROCYTE [DISTWIDTH] IN BLOOD BY AUTOMATED COUNT: 12.8 % (ref 11.6–15.1)
GFR SERPL CREATININE-BSD FRML MDRD: 80 ML/MIN/1.73SQ M
GLUCOSE P FAST SERPL-MCNC: 84 MG/DL (ref 65–99)
HCT VFR BLD AUTO: 46.3 % (ref 36.5–49.3)
HGB BLD-MCNC: 14.7 G/DL (ref 12–17)
MCH RBC QN AUTO: 28.9 PG (ref 26.8–34.3)
MCHC RBC AUTO-ENTMCNC: 31.7 G/DL (ref 31.4–37.4)
MCV RBC AUTO: 91 FL (ref 82–98)
PHOSPHATE SERPL-MCNC: 3.1 MG/DL (ref 2.7–4.5)
PLATELET # BLD AUTO: 300 THOUSANDS/UL (ref 149–390)
PMV BLD AUTO: 10.6 FL (ref 8.9–12.7)
POTASSIUM SERPL-SCNC: 4.1 MMOL/L (ref 3.5–5.3)
PROT UR-MCNC: <6 MG/DL
PTH-INTACT SERPL-MCNC: 36.5 PG/ML (ref 18.4–80.1)
RBC # BLD AUTO: 5.09 MILLION/UL (ref 3.88–5.62)
SODIUM SERPL-SCNC: 136 MMOL/L (ref 135–147)
WBC # BLD AUTO: 6.3 THOUSAND/UL (ref 4.31–10.16)

## 2022-11-11 NOTE — TELEPHONE ENCOUNTER
Spoke with patient  He wanted to relay message that he had his labs drawn today for his appointment next week

## 2022-11-11 NOTE — TELEPHONE ENCOUNTER
Patient returned call and is requesting a call from Dr Ewelina Casanova regarding the lab work he had done today  Please advise

## 2022-11-16 ENCOUNTER — TELEPHONE (OUTPATIENT)
Dept: UROLOGY | Facility: HOSPITAL | Age: 37
End: 2022-11-16

## 2022-11-16 DIAGNOSIS — N13.30 HYDRONEPHROSIS DETERMINED BY ULTRASOUND: Primary | ICD-10-CM

## 2022-11-16 NOTE — TELEPHONE ENCOUNTER
----- Message from [id number] Rosalia Ba sent at 11/15/2022 12:57 PM EST -----  Received results of renal ultrasound which still shows presence of chronic right-sided hydronephrosis  He was recently seen in the office in follow-up with Dr Armenta Darling  If patient remains asymptomatic, renal function stable, and no urinary tract infections, he may follow-up in 1 year with repeat renal ultrasound

## 2022-11-17 ENCOUNTER — OFFICE VISIT (OUTPATIENT)
Dept: NEPHROLOGY | Facility: CLINIC | Age: 37
End: 2022-11-17

## 2022-11-17 VITALS
BODY MASS INDEX: 28.88 KG/M2 | HEART RATE: 85 BPM | DIASTOLIC BLOOD PRESSURE: 81 MMHG | WEIGHT: 184 LBS | HEIGHT: 67 IN | OXYGEN SATURATION: 99 % | SYSTOLIC BLOOD PRESSURE: 138 MMHG

## 2022-11-17 DIAGNOSIS — N18.31 STAGE 3A CHRONIC KIDNEY DISEASE (HCC): Primary | ICD-10-CM

## 2022-11-17 DIAGNOSIS — Q62.11 HYDRONEPHROSIS WITH URETEROPELVIC JUNCTION (UPJ) OBSTRUCTION: Chronic | ICD-10-CM

## 2022-11-17 NOTE — PROGRESS NOTES
OFFICE FOLLOW UP - Nephrology   John Mcghee 31 y o  male MRN: [id number]       ASSESSMENT and PLAN:  Bhaskar Cade was seen today for follow-up and chronic kidney disease  Diagnoses and all orders for this visit:    Stage 3a chronic kidney disease ([address] )  -     Microalbumin / creatinine urine ratio; Future  -     Basic metabolic panel; Future    Hydronephrosis with ureteropelvic junction (UPJ) obstruction        This is a 31year-old gentleman who returns to the office for year CKD stage II/3 follow-up  1   Chronic kidney disease stage 2/3A, previous creatinine around 1 3-1 6 since 2009 in the setting of chronic severe right hydronephrosis and solitary functional kidney  Kidney function fairly stable with a most recent creatinine of 1 17, no proteinuria  Once again discussed with patient about importance to stay well-hydrated, follow low-salt diet, stay physically active and keep a good weight  Avoid NSAIDs  I would like to follow repeat blood and urine test in 6 months, if renal function remains stable I would like to see him back in the office in 1 year  2   Chronic severe right hydronephrosis, with essentially nonfunctional right kidney based on most recent Mag 3 scan  Recommend to continue follow-up with Urology    3  Hemoglobin normal  Last hemoglobin 14 7    4  Mineral bone disease, most recent phosphorus and PTH within normal limits  Follow labs in 1 year    5  Hemodynamics, blood pressure currently acceptable after recheck  Continue with low-salt diet        Patient Instructions   As we discussed in the office visit based on the most recent blood and urine test your kidney function remains stable  Based on previous Mag 3 scan essentially your right kidney is nonfunctional   Continue follow-up with urologist instructed  I would like you to go for repeat blood and urine test in 6 months, will contact you with the results    If your kidney function remains stable I would like to see you back in the office in 1 year  Continue close follow-up with primary care doctor  Remember to stay well-hydrated  Remember to stay physically active and keep a good weight  Avoid NSAIDs (no ibuprofen, Motrin, Advil, Aleve, naproxen)  Okay to take Tylenol or paracetamol or acetaminophen as needed for pain  HPI: Jad Sosa is a 31 y o  male who is here for Follow-up and Chronic Kidney Disease    The last time seen was in 05/2022, today returns for six month follow-up  Patient in general is doing well, no significant complaints  He saw urology recently, had a Mag 3 scan and shows an essentially nonfunctional right kidney    Patient denies any chest pain, shortness of breath or leg swelling  Denies any abdominal pain, no recent nausea, no vomiting, no diarrhea or constipation  Denies urinary problems,, no burning sensation, no gross hematuria  Appetite is OK, weight increased 3 lbs over last 6 months  Only takes Tylenol for pain  Denies tobacco abuse    Recent blood test were done on 11/11/2022, results were reviewed with patient, most recent creatinine 1 17 with an estimated GFR of 80, no proteinuria      ROS: All the systems were reviewed and were negative except as documented on the H&P            Allergies: Sulfa antibiotics    Medications:   Current Outpatient Medications:   •  acetaminophen (TYLENOL) 325 mg tablet, Take 650 mg by mouth every 6 (six) hours as needed for mild pain, Disp: , Rfl:   •  albuterol (PROVENTIL HFA,VENTOLIN HFA) 90 mcg/act inhaler, Inhale 2 puffs every 6 (six) hours as needed for wheezing, Disp: , Rfl:   •  Cholecalciferol (VITAMIN D PO), Take 5,000 Units by mouth daily , Disp: , Rfl:   •  cholecalciferol (VITAMIN D3) 1,000 units tablet, Take 2,000 Units by mouth daily, Disp: , Rfl:   •  fluticasone (FLONASE) 50 mcg/act nasal spray, 2 sprays into each nostril daily  (Patient not taking: Reported on 11/19/2021), Disp: , Rfl:   •  predniSONE 10 mg tablet, if needed (Patient not taking: Reported on 11/17/2022), Disp: , Rfl:     Past Medical History:   Diagnosis Date   • Renal failure      Past Surgical History:   Procedure Laterality Date   • TONSILLECTOMY AND ADENOIDECTOMY       Family History   Family history unknown: Yes      reports that he has never smoked  He has never used smokeless tobacco  He reports that he does not drink alcohol and does not use drugs  Physical Exam:   Vitals:    11/17/22 0846 11/17/22 0906   BP: 140/98 138/81   BP Location: Left arm Right arm   Patient Position: Sitting Sitting   Cuff Size: Adult Standard   Pulse: 85    SpO2: 99%    Weight: 83 5 kg (184 lb)    Height: 5' 7" (1 702 m)      Body mass index is 28 82 kg/m²      General: conscious, cooperative, in not acute distress  Eyes: conjunctivae pink, anicteric sclerae  ENT: lips and mucous membranes moist  Neck: supple, no JVD  Chest: clear breath sounds bilateral, no crackles, ronchus or wheezings  CVS: distinct S1 & S2, normal rate, regular rhythm  Abdomen: soft, non-tender, non-distended, normoactive bowel sounds  Back: no CVA tenderness  Extremities: no edema of both legs  Skin: no rash  Neuro: awake, alert, oriented          Lab Results:   Results from last 7 days   Lab Units 11/11/22  1016   WBC Thousand/uL 6 30   HEMOGLOBIN g/dL 14 7   HEMATOCRIT % 46 3   PLATELETS Thousands/uL 300   POTASSIUM mmol/L 4 1   CHLORIDE mmol/L 105   CO2 mmol/L 26   BUN mg/dL 17   CREATININE mg/dL 1 17   CALCIUM mg/dL 10 1   PHOSPHORUS mg/dL 3 1       Laboratory Results:  Lab Results   Component Value Date    WBC 6 30 11/11/2022    HGB 14 7 11/11/2022    HCT 46 3 11/11/2022    MCV 91 11/11/2022     11/11/2022     Lab Results   Component Value Date    SODIUM 136 11/11/2022    K 4 1 11/11/2022     11/11/2022    CO2 26 11/11/2022    BUN 17 11/11/2022    CREATININE 1 17 11/11/2022    GLUC 87 05/27/2022    CALCIUM 10 1 11/11/2022       Lab Results   Component Value Date    PTH 36 5 11/11/2022    CALCIUM 10 1 11/11/2022    PHOS 3 1 11/11/2022               Portions of the record may have been created with voice recognition software  Occasional wrong word or "sound a like" substitutions may have occurred due to the inherent limitations of voice recognition software  Read the chart carefully and recognize, using context, where substitutions have occurred  If you have any questions, please contact the dictating provider

## 2022-11-17 NOTE — TELEPHONE ENCOUNTER
[facility] and relayed information- he is not having any symptoms now but he will call if he has anything   Set up card for year follow up

## 2022-11-17 NOTE — LETTER
November 17, 2022     [address] [place] [id number]    Patient: Ofelia Dee   YOB: 1985   Date of Visit: 11/17/2022       Dear Dr Joanie Sin Recipients: Thank you for referring Hyun Woodard to me for evaluation  Below are my notes for this consultation  If you have questions, please do not hesitate to call me  I look forward to following your patient along with you  Sincerely,        Nuria Sims MD        CC: No Recipients  Nuria Sims MD  11/17/2022  9:15 AM  Sign when Signing Visit  OFFICE FOLLOW UP - Nephrology   Ofelia Dee 31 y o  male MRN: [id number]       ASSESSMENT and PLAN:  Kerri Salcedo was seen today for follow-up and chronic kidney disease  Diagnoses and all orders for this visit:    Stage 3a chronic kidney disease ([address] )  -     Microalbumin / creatinine urine ratio; Future  -     Basic metabolic panel; Future    Hydronephrosis with ureteropelvic junction (UPJ) obstruction        This is a 31year-old gentleman who returns to the office for year CKD stage II/3 follow-up  1   Chronic kidney disease stage 2/3A, previous creatinine around 1 3-1 6 since 2009 in the setting of chronic severe right hydronephrosis and solitary functional kidney  Kidney function fairly stable with a most recent creatinine of 1 17, no proteinuria  Once again discussed with patient about importance to stay well-hydrated, follow low-salt diet, stay physically active and keep a good weight  Avoid NSAIDs  I would like to follow repeat blood and urine test in 6 months, if renal function remains stable I would like to see him back in the office in 1 year  2   Chronic severe right hydronephrosis, with essentially nonfunctional right kidney based on most recent Mag 3 scan  Recommend to continue follow-up with Urology    3  Hemoglobin normal  Last hemoglobin 14 7    4  Mineral bone disease, most recent phosphorus and PTH within normal limits  Follow labs in 1 year    5  Hemodynamics, blood pressure currently acceptable after recheck  Continue with low-salt diet        Patient Instructions   As we discussed in the office visit based on the most recent blood and urine test your kidney function remains stable  Based on previous Mag 3 scan essentially your right kidney is nonfunctional   Continue follow-up with urologist instructed  I would like you to go for repeat blood and urine test in 6 months, will contact you with the results  If your kidney function remains stable I would like to see you back in the office in 1 year  Continue close follow-up with primary care doctor  Remember to stay well-hydrated  Remember to stay physically active and keep a good weight  Avoid NSAIDs (no ibuprofen, Motrin, Advil, Aleve, naproxen)  Okay to take Tylenol or paracetamol or acetaminophen as needed for pain  HPI: Baron Soto is a 31 y o  male who is here for Follow-up and Chronic Kidney Disease    The last time seen was in 05/2022, today returns for six month follow-up  Patient in general is doing well, no significant complaints  He saw urology recently, had a Mag 3 scan and shows an essentially nonfunctional right kidney    Patient denies any chest pain, shortness of breath or leg swelling  Denies any abdominal pain, no recent nausea, no vomiting, no diarrhea or constipation  Denies urinary problems,, no burning sensation, no gross hematuria  Appetite is OK, weight increased 3 lbs over last 6 months  Only takes Tylenol for pain  Denies tobacco abuse    Recent blood test were done on 11/11/2022, results were reviewed with patient, most recent creatinine 1 17 with an estimated GFR of 80, no proteinuria      ROS: All the systems were reviewed and were negative except as documented on the H&P            Allergies: Sulfa antibiotics    Medications:   Current Outpatient Medications:   •  acetaminophen (TYLENOL) 325 mg tablet, Take 650 mg by mouth every 6 (six) hours as needed for mild pain, Disp: , Rfl:   •  albuterol (PROVENTIL HFA,VENTOLIN HFA) 90 mcg/act inhaler, Inhale 2 puffs every 6 (six) hours as needed for wheezing, Disp: , Rfl:   •  Cholecalciferol (VITAMIN D PO), Take 5,000 Units by mouth daily , Disp: , Rfl:   •  cholecalciferol (VITAMIN D3) 1,000 units tablet, Take 2,000 Units by mouth daily, Disp: , Rfl:   •  fluticasone (FLONASE) 50 mcg/act nasal spray, 2 sprays into each nostril daily  (Patient not taking: Reported on 11/19/2021), Disp: , Rfl:   •  predniSONE 10 mg tablet, if needed (Patient not taking: Reported on 11/17/2022), Disp: , Rfl:     Past Medical History:   Diagnosis Date   • Renal failure      Past Surgical History:   Procedure Laterality Date   • TONSILLECTOMY AND ADENOIDECTOMY       Family History   Family history unknown: Yes      reports that he has never smoked  He has never used smokeless tobacco  He reports that he does not drink alcohol and does not use drugs  Physical Exam:   Vitals:    11/17/22 0846 11/17/22 0906   BP: 140/98 138/81   BP Location: Left arm Right arm   Patient Position: Sitting Sitting   Cuff Size: Adult Standard   Pulse: 85    SpO2: 99%    Weight: 83 5 kg (184 lb)    Height: 5' 7" (1 702 m)      Body mass index is 28 82 kg/m²      General: conscious, cooperative, in not acute distress  Eyes: conjunctivae pink, anicteric sclerae  ENT: lips and mucous membranes moist  Neck: supple, no JVD  Chest: clear breath sounds bilateral, no crackles, ronchus or wheezings  CVS: distinct S1 & S2, normal rate, regular rhythm  Abdomen: soft, non-tender, non-distended, normoactive bowel sounds  Back: no CVA tenderness  Extremities: no edema of both legs  Skin: no rash  Neuro: awake, alert, oriented          Lab Results:   Results from last 7 days   Lab Units 11/11/22  1016   WBC Thousand/uL 6 30   HEMOGLOBIN g/dL 14 7   HEMATOCRIT % 46 3   PLATELETS Thousands/uL 300   POTASSIUM mmol/L 4 1   CHLORIDE mmol/L 105 CO2 mmol/L 26   BUN mg/dL 17   CREATININE mg/dL 1 17   CALCIUM mg/dL 10 1   PHOSPHORUS mg/dL 3 1       Laboratory Results:  Lab Results   Component Value Date    WBC 6 30 11/11/2022    HGB 14 7 11/11/2022    HCT 46 3 11/11/2022    MCV 91 11/11/2022     11/11/2022     Lab Results   Component Value Date    SODIUM 136 11/11/2022    K 4 1 11/11/2022     11/11/2022    CO2 26 11/11/2022    BUN 17 11/11/2022    CREATININE 1 17 11/11/2022    GLUC 87 05/27/2022    CALCIUM 10 1 11/11/2022       Lab Results   Component Value Date    PTH 36 5 11/11/2022    CALCIUM 10 1 11/11/2022    PHOS 3 1 11/11/2022               Portions of the record may have been created with voice recognition software  Occasional wrong word or "sound a like" substitutions may have occurred due to the inherent limitations of voice recognition software  Read the chart carefully and recognize, using context, where substitutions have occurred  If you have any questions, please contact the dictating provider

## 2022-11-17 NOTE — PATIENT INSTRUCTIONS
As we discussed in the office visit based on the most recent blood and urine test your kidney function remains stable  Based on previous Mag 3 scan essentially your right kidney is nonfunctional   Continue follow-up with urologist instructed  I would like you to go for repeat blood and urine test in 6 months, will contact you with the results  If your kidney function remains stable I would like to see you back in the office in 1 year  Continue close follow-up with primary care doctor  Remember to stay well-hydrated  Remember to stay physically active and keep a good weight  Avoid NSAIDs (no ibuprofen, Motrin, Advil, Aleve, naproxen)  Okay to take Tylenol or paracetamol or acetaminophen as needed for pain

## 2023-01-16 ENCOUNTER — CLINICAL SUPPORT (OUTPATIENT)
Dept: NUTRITION | Facility: HOSPITAL | Age: 38
End: 2023-01-16

## 2023-01-16 VITALS — BODY MASS INDEX: 28.58 KG/M2 | WEIGHT: 182.1 LBS | HEIGHT: 67 IN

## 2023-01-16 DIAGNOSIS — N18.31 STAGE 3A CHRONIC KIDNEY DISEASE (HCC): ICD-10-CM

## 2023-01-16 NOTE — PROGRESS NOTES
Nutrition Assessment Form    Patient Name: Alexandra Gray    YOB: 1985    Sex: Male     Assessment Date: 1/16/2023  Start Time: 10:50am Stop Time: 11:50am Total Minutes: 60 mins     Data:  Present at session:  self   Parent/Patient Concerns/reason for visit: PT concerned with weight gain overtime and concerned with making CKD worse, reported doctor said not to go over 180lbs   Medical Dx/Reason for Referral: N18 31 stage 3c CKD   Past Medical History:   Diagnosis Date   • Renal failure        Current Outpatient Medications   Medication Sig Dispense Refill   • acetaminophen (TYLENOL) 325 mg tablet Take 650 mg by mouth every 6 (six) hours as needed for mild pain     • albuterol (PROVENTIL HFA,VENTOLIN HFA) 90 mcg/act inhaler Inhale 2 puffs every 6 (six) hours as needed for wheezing     • Cholecalciferol (VITAMIN D PO) Take 5,000 Units by mouth daily      • cholecalciferol (VITAMIN D3) 1,000 units tablet Take 2,000 Units by mouth daily     • fluticasone (FLONASE) 50 mcg/act nasal spray 2 sprays into each nostril daily  (Patient not taking: Reported on 11/19/2021)     • predniSONE 10 mg tablet if needed (Patient not taking: Reported on 11/17/2022)       No current facility-administered medications for this visit          Additional Meds/Supplements: no   Special Learning Needs/barriers to learning/any new barriers Reported intellectual learning disability, challenges with comprehension in regards to medical matters   Height: HC Readings from Last 5 Encounters:   No data found for [facility]       Weight: Wt Readings from Last 10 Encounters:   01/16/23 82 6 kg (182 lb 1 6 oz)   11/17/22 83 5 kg (184 lb)   09/22/22 83 5 kg (184 lb)   08/16/22 83 5 kg (184 lb)   05/31/22 82 2 kg (181 lb 3 2 oz)   11/19/21 82 6 kg (182 lb)   11/03/20 85 1 kg (187 lb 9 6 oz)   10/25/19 86 5 kg (190 lb 9 6 oz)   10/05/18 82 9 kg (182 lb 12 8 oz)   07/13/18 84 4 kg (186 lb)     Estimated body mass index is 28 52 kg/m² as calculated from the following:    Height as of this encounter: 5' 7" (1 702 m)  Weight as of this encounter: 82 6 kg (182 lb 1 6 oz)  Recent Weight Change: []Yes     [x]No  Amount:  weight has been stable, fluctuates within 2 pounds past year      Energy Needs: [address] [facility] Equation:  x1 4 minus [id number]    Allergies   Allergen Reactions   • Sulfa Antibiotics     or intolerances    Social History     Substance and Sexual Activity   Alcohol Use No    _______x/wk or month  1 or 2 or 3 or 4 or____ drinks/session   Mixed drinks/ wine/ beer    none   Social History     Tobacco Use   Smoking Status Never   Smokeless Tobacco Never       Who shops? patient   Who cooks/cooking methods   patient  Cooking methods: bake/mcnair/air mcnair/grill/boil/other________  Take out: __1_ x/wk or month Dining out 1x month   Exercise: shoots basketball and lifts weights 1 x week 30mins to 1 5 hr   Prior Nutritional Counseling? []Yes     [x]No  When:      Why:         Diet Hx:  Breakfast: Typically skips B, cereal or bread and butter, coffee   Lunch: 1 slice pizza from [employer], or 6 bagel bites, reshma soup          Dinner:   Meat, rice, potatoes, beans, pizza, wings       Snacks: AM -   PM - pringles, fritos  HS - pringles, fritos, pork rinds  Averages 7 -9cups a day of coffee, first cup with 3 teaspoons of sugar and milk, rest of them no sugar        Nutrition Diagnosis:   Food and nutrition related knowledge deficit  related to Lack of prior exposure to accurate nutrition related information as evidenced by Conditions associated with diagnosis or treatment       Any change or new dx since previous visit:     Nutrition Diagnosis:         Medical Nutrition Therapy Intervention:  [x]Individualized Meal Plan Discussed the importance of eating 3 meals daily and not skipping, and how metabolism is affected    Also discussed adding in small snacks or 5-6 small meals daily if desired vs 3 larger ones, and appropriate options and portions  Appropriate timing of meals, including eating within 1 hr of waking and eating meals slowly 20mins/meals, minimizing mindless/boredom or habitual eating, etc was also mentioned  Discussed the plate method of portioning foods, including half a plate fruits and vegetables or a half plate all vegetables, 1/4 of the plate a lean protein source or meat, and a 1/4 of the plate being a whole grain carb- usually 1/2-1c  This should be followed for at least 2 meals of the day, but could also be followed for all 3  []Understanding Lab Values   [x]Basic Pathophysiology of Disease Discussed nutrients potassium, phosphorus, protein and sodium, reviewed which foods are high and to have in moderation  Tips for flavoring versus salt  []Food/Medication Interactions   []Food Diary []Exercise   []Lifestyle/Behavior Modification Techniques []Medication, Mechanism of Action   []Label Reading: CHO/ Na/ Fat/ other_________ []Self Blood Glucose Monitoring   [x]Weight/BMI Goals: gain/lose/maintain Short term goal of 2-4# x 1 month or next visit []Other -    Other Notes/Assessment:       Comprehension: []Excellent  []Very Good  [x]Good  []Fair   []Poor    Receptivity: []Excellent  []Very Good  [x]Good  []Fair   []Poor    Expected Compliance: []Excellent  []Very Good  [x]Good  []Fair   []Poor        Goals:  1  Avoid skipping breakfast, consume 3 balanced meals at least 2 of the meals using the plate method by f/u   2  Replace high salty snacks with fresh fruit or vegetables or lower sodium snacks by f/u   3  Aim for 2-4 lb weight loss x 1 month by f/u       No follow-ups on file    Labs:  CMP  Lab Results   Component Value Date     08/01/2015    K 4 1 11/11/2022     11/11/2022    CO2 26 11/11/2022    ANIONGAP 7 08/01/2015    BUN 17 11/11/2022    CREATININE 1 17 11/11/2022    GLUCOSE 87 08/01/2015    GLUF 84 11/11/2022    CALCIUM 10 1 11/11/2022    AST 15 08/26/2018    ALT 19 08/26/2018    ALKPHOS 67 08/26/2018    EGFR 80 11/11/2022       BMP  Lab Results   Component Value Date    GLUCOSE 87 08/01/2015    CALCIUM 10 1 11/11/2022     08/01/2015    K 4 1 11/11/2022    CO2 26 11/11/2022     11/11/2022    BUN 17 11/11/2022    CREATININE 1 17 11/11/2022       Lipids  Lab Results   Component Value Date    CHOL 197 09/24/2015     Lab Results   Component Value Date    HDL 41 02/24/2019    HDL 37 (L) 07/31/2016    HDL 43 09/24/2015     Lab Results   Component Value Date    LDLCALC 142 (H) 02/24/2019    LDLCALC 123 (H) 07/31/2016    LDLCALC 134 (H) 09/24/2015     Lab Results   Component Value Date    TRIG 115 02/24/2019    TRIG 96 07/31/2016    TRIG 101 09/24/2015     No results found for: CHOLHDL    Hemoglobin A1C  No results found for: HGBA1C    Fasting Glucose  Lab Results   Component Value Date    GLUF 84 11/11/2022       Insulin     Thyroid  No results found for: TSH, W4ZQOAK, R2HNUNN, THYROIDAB    Hepatic Function Panel  Lab Results   Component Value Date    ALT 19 08/26/2018    AST 15 08/26/2018    ALKPHOS 67 08/26/2018       Celiac Disease Antibody Panel  No results found for: ENDOMYSIAL IGA, GLIADIN IGA, GLIADIN IGG, IGA, TISSUE TRANSGLUT AB, TTG IGA   Iron  No results found for: IRON, TIBC, FERRITIN         Bj Byrne, RD  [address] [facility]  [address] [id number]

## 2023-02-03 ENCOUNTER — TELEPHONE (OUTPATIENT)
Dept: NEPHROLOGY | Facility: CLINIC | Age: 38
End: 2023-02-03

## 2023-02-17 ENCOUNTER — TELEPHONE (OUTPATIENT)
Dept: NEPHROLOGY | Facility: CLINIC | Age: 38
End: 2023-02-17

## 2023-02-17 NOTE — TELEPHONE ENCOUNTER
Called Chana Bob to make sure she received Dr Patricia March message and I left a voicemail asking to Chana Bob to please call the office to let us know she received Dr Patricia March message

## 2023-02-17 NOTE — TELEPHONE ENCOUNTER
Called number below, no answer, left a message  Based on Miew prema, terbinafine renal dosing is not defined for patients with renal impairment  Please call Mariola Stahl to make sure she received my message: unfortunately is not defined for patients with renal impairment, recommend to discuss with pharmacist regarding further recommendations    Thanks,

## 2023-02-17 NOTE — TELEPHONE ENCOUNTER
Call from George Ziegler from [facility] calling to get clearance on a medication Terbinasine  She wants to verify if its ok for patient to take due to kidney function-patient has only one kidney she stated  Request callback regarding above   Contact George Ziegler [phone]

## 2023-03-19 ENCOUNTER — HOSPITAL ENCOUNTER (EMERGENCY)
Facility: HOSPITAL | Age: 38
Discharge: HOME | End: 2023-03-19
Attending: EMERGENCY MEDICINE

## 2023-03-19 ENCOUNTER — APPOINTMENT (EMERGENCY)
Dept: RADIOLOGY | Facility: HOSPITAL | Age: 38
End: 2023-03-19

## 2023-03-19 VITALS
OXYGEN SATURATION: 99 % | DIASTOLIC BLOOD PRESSURE: 91 MMHG | SYSTOLIC BLOOD PRESSURE: 146 MMHG | BODY MASS INDEX: 29.38 KG/M2 | HEART RATE: 83 BPM | WEIGHT: 187.61 LBS | TEMPERATURE: 97.9 F | RESPIRATION RATE: 14 BRPM

## 2023-03-19 DIAGNOSIS — R03.0 ELEVATED BLOOD PRESSURE READING: Primary | ICD-10-CM

## 2023-03-19 LAB
ANION GAP SERPL CALCULATED.3IONS-SCNC: 8 MMOL/L (ref 4–13)
ATRIAL RATE: 68 BPM
BASOPHILS # BLD AUTO: 0.03 THOUSANDS/ÂΜL (ref 0–0.1)
BASOPHILS NFR BLD AUTO: 1 % (ref 0–1)
BUN SERPL-MCNC: 15 MG/DL (ref 5–25)
CALCIUM SERPL-MCNC: 9.6 MG/DL (ref 8.4–10.2)
CARDIAC TROPONIN I PNL SERPL HS: <2 NG/L
CHLORIDE SERPL-SCNC: 103 MMOL/L (ref 96–108)
CO2 SERPL-SCNC: 26 MMOL/L (ref 21–32)
CREAT SERPL-MCNC: 1.21 MG/DL (ref 0.6–1.3)
EOSINOPHIL # BLD AUTO: 0.27 THOUSAND/ÂΜL (ref 0–0.61)
EOSINOPHIL NFR BLD AUTO: 5 % (ref 0–6)
ERYTHROCYTE [DISTWIDTH] IN BLOOD BY AUTOMATED COUNT: 13.1 % (ref 11.6–15.1)
GFR SERPL CREATININE-BSD FRML MDRD: 77 ML/MIN/1.73SQ M
GLUCOSE SERPL-MCNC: 89 MG/DL (ref 65–140)
HCT VFR BLD AUTO: 44.9 % (ref 36.5–49.3)
HGB BLD-MCNC: 14.6 G/DL (ref 12–17)
IMM GRANULOCYTES # BLD AUTO: 0.03 THOUSAND/UL (ref 0–0.2)
IMM GRANULOCYTES NFR BLD AUTO: 1 % (ref 0–2)
LYMPHOCYTES # BLD AUTO: 1.21 THOUSANDS/ÂΜL (ref 0.6–4.47)
LYMPHOCYTES NFR BLD AUTO: 20 % (ref 14–44)
MCH RBC QN AUTO: 29.7 PG (ref 26.8–34.3)
MCHC RBC AUTO-ENTMCNC: 32.5 G/DL (ref 31.4–37.4)
MCV RBC AUTO: 91 FL (ref 82–98)
MONOCYTES # BLD AUTO: 0.47 THOUSAND/ÂΜL (ref 0.17–1.22)
MONOCYTES NFR BLD AUTO: 8 % (ref 4–12)
NEUTROPHILS # BLD AUTO: 4.02 THOUSANDS/ÂΜL (ref 1.85–7.62)
NEUTS SEG NFR BLD AUTO: 65 % (ref 43–75)
NRBC BLD AUTO-RTO: 0 /100 WBCS
P AXIS: 29 DEGREES
PLATELET # BLD AUTO: 280 THOUSANDS/UL (ref 149–390)
PMV BLD AUTO: 9.9 FL (ref 8.9–12.7)
POTASSIUM SERPL-SCNC: 4 MMOL/L (ref 3.5–5.3)
PR INTERVAL: 154 MS
QRS AXIS: 81 DEGREES
QRSD INTERVAL: 86 MS
QT INTERVAL: 364 MS
QTC INTERVAL: 387 MS
RBC # BLD AUTO: 4.91 MILLION/UL (ref 3.88–5.62)
SODIUM SERPL-SCNC: 137 MMOL/L (ref 135–147)
T WAVE AXIS: 59 DEGREES
VENTRICULAR RATE: 68 BPM
WBC # BLD AUTO: 6.03 THOUSAND/UL (ref 4.31–10.16)

## 2023-03-20 NOTE — ED PROVIDER NOTES
History  Chief Complaint   Patient presents with   • Hypertension     Pt presents to the ED with c/o not feeling well/htn  Not dx with htn       History provided by:  Patient   used: No    Hypertension  Associated symptoms: no abdominal pain, no chest pain, no dizziness, no fever, no headaches, no nausea, no neck pain, no palpitations, no shortness of breath, not vomiting and no weakness      30year-old presenting to the ER for evaluation due to elevated blood pressure earlier today  States he felt warm and checked his blood pressure and was elevated  Denies chest pain or shortness of breath  No headache  No neck pain  No vision changes  No nausea vomiting  No abdominal pain  No back pain  Patient has 1 kidney since childhood  No history of hypertension  Prior to Admission Medications   Prescriptions Last Dose Informant Patient Reported? Taking? Cholecalciferol (VITAMIN D PO)   Yes No   Sig: Take 5,000 Units by mouth daily    acetaminophen (TYLENOL) 325 mg tablet   Yes No   Sig: Take 650 mg by mouth every 6 (six) hours as needed for mild pain   albuterol (PROVENTIL HFA,VENTOLIN HFA) 90 mcg/act inhaler   Yes No   Sig: Inhale 2 puffs every 6 (six) hours as needed for wheezing   cholecalciferol (VITAMIN D3) 1,000 units tablet   Yes No   Sig: Take 2,000 Units by mouth daily   fluticasone (FLONASE) 50 mcg/act nasal spray   Yes No   Si sprays into each nostril daily    Patient not taking: Reported on 2021   predniSONE 10 mg tablet   Yes No   Sig: if needed   Patient not taking: Reported on 2022      Facility-Administered Medications: None       Past Medical History:   Diagnosis Date   • Renal failure        Past Surgical History:   Procedure Laterality Date   • TONSILLECTOMY AND ADENOIDECTOMY         Family History   Family history unknown: Yes     I have reviewed and agree with the history as documented      E-Cigarette/Vaping     E-Cigarette/Vaping Substances Social History     Tobacco Use   • Smoking status: Never   • Smokeless tobacco: Never   Substance Use Topics   • Alcohol use: No   • Drug use: No       Review of Systems   Constitutional: Negative for chills, diaphoresis and fever  HENT: Negative for congestion and sore throat  Respiratory: Negative for cough, shortness of breath, wheezing and stridor  Cardiovascular: Negative for chest pain, palpitations and leg swelling  Gastrointestinal: Negative for abdominal pain, blood in stool, diarrhea, nausea and vomiting  Genitourinary: Negative for dysuria, frequency and urgency  Musculoskeletal: Negative for neck pain and neck stiffness  Skin: Negative for pallor and rash  Neurological: Negative for dizziness, syncope, weakness, light-headedness and headaches  All other systems reviewed and are negative  Physical Exam  Physical Exam  Vitals reviewed  Constitutional:       Appearance: Normal appearance  He is well-developed  HENT:      Head: Normocephalic and atraumatic  Eyes:      Extraocular Movements: Extraocular movements intact  Pupils: Pupils are equal, round, and reactive to light  Cardiovascular:      Rate and Rhythm: Normal rate and regular rhythm  Heart sounds: Normal heart sounds  Pulmonary:      Effort: Pulmonary effort is normal  No respiratory distress  Breath sounds: Normal breath sounds  Abdominal:      General: Bowel sounds are normal       Palpations: Abdomen is soft  Tenderness: There is no abdominal tenderness  Musculoskeletal:         General: Normal range of motion  Cervical back: Normal range of motion and neck supple  Skin:     General: Skin is warm and dry  Capillary Refill: Capillary refill takes less than 2 seconds  Neurological:      General: No focal deficit present  Mental Status: He is alert and oriented to person, place, and time           Vital Signs  ED Triage Vitals   Temperature Pulse Respirations Blood Pressure SpO2   03/19/23 1357 03/19/23 1356 03/19/23 1358 03/19/23 1358 03/19/23 1356   97 9 °F (36 6 °C) 83 14 146/91 99 %      Temp Source Heart Rate Source Patient Position - Orthostatic VS BP Location FiO2 (%)   03/19/23 1357 03/19/23 1356 -- 03/19/23 1358 --   Oral Monitor  Right arm       Pain Score       --                  Vitals:    03/19/23 1356 03/19/23 1358   BP:  146/91   Pulse: 83          Visual Acuity      ED Medications  Medications - No data to display    Diagnostic Studies  Results Reviewed     Procedure Component Value Units Date/Time    Basic metabolic panel [[id number]] Collected: 03/19/23 1419    Lab Status: Final result Specimen: Blood from Arm, Right Updated: 03/19/23 1507     Sodium 137 mmol/L      Potassium 4 0 mmol/L      Chloride 103 mmol/L      CO2 26 mmol/L      ANION GAP 8 mmol/L      BUN 15 mg/dL      Creatinine 1 21 mg/dL      Glucose 89 mg/dL      Calcium 9 6 mg/dL      eGFR 77 ml/min/1 73sq m     Narrative:      [place] guidelines for Chronic Kidney Disease (CKD):   •  Stage 1 with normal or high GFR (GFR > 90 mL/min/1 73 square meters)  •  Stage 2 Mild CKD (GFR = 60-89 mL/min/1 73 square meters)  •  Stage 3A Moderate CKD (GFR = 45-59 mL/min/1 73 square meters)  •  Stage 3B Moderate CKD (GFR = 30-44 mL/min/1 73 square meters)  •  Stage 4 Severe CKD (GFR = 15-29 mL/min/1 73 square meters)  •  Stage 5 End Stage CKD (GFR <15 mL/min/1 73 square meters)  Note: GFR calculation is accurate only with a steady state creatinine    HS Troponin 0hr (reflex protocol) [[id number]]  (Normal) Collected: 03/19/23 1419    Lab Status: Final result Specimen: Blood from Arm, Right Updated: 03/19/23 1502     hs TnI 0hr <2 ng/L     CBC and differential [[id number]] Collected: 03/19/23 1419    Lab Status: Final result Specimen: Blood from Arm, Right Updated: 03/19/23 1425     WBC 6 03 Thousand/uL      RBC 4 91 Million/uL      Hemoglobin 14 6 g/dL      Hematocrit 44 9 %      MCV 91 fL      MCH 29 7 pg      MCHC 32 5 g/dL      RDW 13 1 %      MPV 9 9 fL      Platelets 601 Thousands/uL      nRBC 0 /100 WBCs      Neutrophils Relative 65 %      Immat GRANS % 1 %      Lymphocytes Relative 20 %      Monocytes Relative 8 %      Eosinophils Relative 5 %      Basophils Relative 1 %      Neutrophils Absolute 4 02 Thousands/µL      Immature Grans Absolute 0 03 Thousand/uL      Lymphocytes Absolute 1 21 Thousands/µL      Monocytes Absolute 0 47 Thousand/µL      Eosinophils Absolute 0 27 Thousand/µL      Basophils Absolute 0 03 Thousands/µL                  XR chest 1 view portable   ED Interpretation by Woodrow Hughes MD (03/19 1447)   No acute findings                 Procedures  Procedures         ED Course  ED Course as of 03/19/23 2322   [place] Mar 19, 2023   1436 ECG shows rate of 68, sinus, normal axis, normal QRS, signs of early repole in inferior leads II, III and aVF, compared to EKG from 2009 showed the same changes in the inferior leads, no reciprocal changes, independently interpret by me                                             Medical Decision Making  30year-old with blood pressure elevation  Will check for end organ injury including EKG, cardiac enzyme, CBC, BMP    No acute findings on work-up  Blood pressure improving  Will have patient follow-up as outpatient  Amount and/or Complexity of Data Reviewed  Labs: ordered  Radiology: ordered and independent interpretation performed  Disposition  Final diagnoses:   Elevated blood pressure reading     Time reflects when diagnosis was documented in both MDM as applicable and the Disposition within this note     Time User Action Codes Description Comment    3/19/2023  3:12 PM Elen Ferrrea Add [R03 0] Elevated blood pressure reading       ED Disposition     ED Disposition   Discharge    Condition   Stable    Date/Time   Sun Mar 19, 2023  3:12 PM    Comment   [address] discharge to home/self care                 Follow-up Information     Follow up With Specialties Details Why Contact Info Additional Information    [address] In 3 days Reevaluation [address] [facility] [id number]  [address] Emergency Department Emergency Medicine  As needed, If symptoms worsen [address] [facility] [address] [facility] Emergency Department, [address], [place], [place], [address]          Discharge Medication List as of 3/19/2023  3:14 PM      CONTINUE these medications which have NOT CHANGED    Details   acetaminophen (TYLENOL) 325 mg tablet Take 650 mg by mouth every 6 (six) hours as needed for mild pain, Historical Med      albuterol (PROVENTIL HFA,VENTOLIN HFA) 90 mcg/act inhaler Inhale 2 puffs every 6 (six) hours as needed for wheezing, Historical Med      !! Cholecalciferol (VITAMIN D PO) Take 5,000 Units by mouth daily , Historical Med      !! cholecalciferol (VITAMIN D3) 1,000 units tablet Take 2,000 Units by mouth daily, Historical Med      fluticasone (FLONASE) 50 mcg/act nasal spray 2 sprays into each nostril daily , Starting Fri 9/21/2018, Until Sat 9/21/2019, Historical Med      predniSONE 10 mg tablet if needed, Starting Sun 8/28/2022, Historical Med       !! - Potential duplicate medications found  Please discuss with provider  No discharge procedures on file      PDMP Review     None          ED Provider  Electronically Signed by           Leonela Oseguera MD  03/19/23 6196

## 2023-03-22 ENCOUNTER — TELEPHONE (OUTPATIENT)
Dept: NEPHROLOGY | Facility: CLINIC | Age: 38
End: 2023-03-22

## 2023-03-22 NOTE — TELEPHONE ENCOUNTER
Patient called requesting Dr Juan Manuel Trinh look over the results of the blood work that was done while patient was in the ER  Patient stated that on Monday he was at [facility]

## 2023-03-23 NOTE — TELEPHONE ENCOUNTER
Called patient to make sure received Dr Brenda Rocha message and left a voicemail stating the following information: recent blood test show stable kidney function, agree with amlodipine for hypertension, continue follow up with primary care doctor and follow low salt diet  I advise patient to please call the office to let us know he received the message and if have questions or concerns

## 2023-03-23 NOTE — TELEPHONE ENCOUNTER
Have called patient back, no answer, left message  He went recently to the emergency department due to elevated blood pressure, have blood test done, kidney function is stable  Saw his primary care doctor couple of days ago and was started on amlodipine for hypertension  I left a message to patient stating that recent blood test show stable kidney function, agree with amlodipine for hypertension, continue follow-up with primary care doctor and follow low-salt diet  Please contact patient to make sure he received my message    Thanks,

## 2023-05-20 NOTE — TELEPHONE ENCOUNTER
Initial SW/CM Assessment/Plan of Care Note     Baseline Assessment  61 year old admitted 5/19/2023 as Inpatient with a diagnosis of Palpitation-hx of recent PE, subarachnoid hematoma, elevated troponin, recent history of multiple bilateral PE, and recent meniscus tear.   Prior to admission patient was living with his Spouse and residing at House    .   Patient’s Primary Care Provider is Calos Arellano MD.     Progress Note  Chart reviewed and prior notes appreciated. Case discussed briefly with Primary RN, Charge RN, and the covering . Met with the patient and his spouse, Jessie, at bedside where the RNCM role was introduced, the facesheet information was verified, and initiated safe dc planning conversations. DC plans pending for home with family support and resumption of outpatient PT. Patient states he has been going to outpatient PT for his knee . Home Health offered and declined by patient. Continue with plan of care and medical management; further recs pending.    CM to follow up and re-eval discharge needs.       Plan  Patient/Family Discharge Goal: OP therapy, Home   Is patient/family goal achievable: Yes    SW/CM - Recommendations for Discharge: OP therapy, Home     Barriers to Discharge    Anticipate patient will need post-hospital services. Necessary services are available.  Anticipate patient can return to the environment from which patient entered the hospital.   Anticipate patient can provide self-care at discharge.    Refer to SW/CM Flowsheet for objective data.     Medical History  Past Medical History:   Diagnosis Date   • Pulmonary embolism (CMD)        Prior to Admission Status  Functional Status  Ambulation: Independent/Self  Bathing: Independent/Self  Dressing: Independent/Self  Toileting: Independent/Self    Agency/Support  Type of Services Prior to Hospitalization: Outpatient services   Support Systems: Children, Spouse   Home Devices/Equipment: None  Mobility Assist Devices:  Phone call from Jillian 0717 asking if Dr Emily Lilly would be able to see his test results from Dr Maria Alejandra Marroquin- assured him that yes he does have access to them None  Sensory Support Devices: Eyeglasses      Current Status  Current Mental Status: Alert, Oriented to Person, Oriented to Place, Oriented to Reason for Hospitalization    Insurance  Primary: [facility]  Secondary: N/A    Disposition Recommendations:  SW/CM recommendation for discharge: OP therapy, Home     Siria Bell RN BSN MSN  Covering Weekend RN/CM

## 2023-05-22 ENCOUNTER — APPOINTMENT (EMERGENCY)
Dept: RADIOLOGY | Facility: HOSPITAL | Age: 38
End: 2023-05-22

## 2023-05-22 ENCOUNTER — HOSPITAL ENCOUNTER (EMERGENCY)
Facility: HOSPITAL | Age: 38
Discharge: HOME | End: 2023-05-22
Attending: EMERGENCY MEDICINE

## 2023-05-22 VITALS
DIASTOLIC BLOOD PRESSURE: 79 MMHG | SYSTOLIC BLOOD PRESSURE: 118 MMHG | OXYGEN SATURATION: 96 % | RESPIRATION RATE: 16 BRPM | HEIGHT: 67 IN | TEMPERATURE: 98 F | BODY MASS INDEX: 29.07 KG/M2 | HEART RATE: 75 BPM | WEIGHT: 185.19 LBS

## 2023-05-22 DIAGNOSIS — R07.89 ATYPICAL CHEST PAIN: Primary | ICD-10-CM

## 2023-05-22 DIAGNOSIS — S29.011A MUSCLE STRAIN OF CHEST WALL, INITIAL ENCOUNTER: ICD-10-CM

## 2023-05-22 LAB
ALBUMIN SERPL BCP-MCNC: 4.9 G/DL (ref 3.5–5)
ALP SERPL-CCNC: 53 U/L (ref 34–104)
ALT SERPL W P-5'-P-CCNC: 15 U/L (ref 7–52)
ANION GAP SERPL CALCULATED.3IONS-SCNC: 6 MMOL/L (ref 4–13)
AST SERPL W P-5'-P-CCNC: 20 U/L (ref 13–39)
ATRIAL RATE: 84 BPM
BASOPHILS # BLD AUTO: 0.04 THOUSANDS/ÂΜL (ref 0–0.1)
BASOPHILS NFR BLD AUTO: 1 % (ref 0–1)
BILIRUB SERPL-MCNC: 0.47 MG/DL (ref 0.2–1)
BUN SERPL-MCNC: 16 MG/DL (ref 5–25)
CALCIUM SERPL-MCNC: 9.6 MG/DL (ref 8.4–10.2)
CARDIAC TROPONIN I PNL SERPL HS: 3 NG/L
CHLORIDE SERPL-SCNC: 102 MMOL/L (ref 96–108)
CO2 SERPL-SCNC: 27 MMOL/L (ref 21–32)
CREAT SERPL-MCNC: 1.12 MG/DL (ref 0.6–1.3)
EOSINOPHIL # BLD AUTO: 0.33 THOUSAND/ÂΜL (ref 0–0.61)
EOSINOPHIL NFR BLD AUTO: 6 % (ref 0–6)
ERYTHROCYTE [DISTWIDTH] IN BLOOD BY AUTOMATED COUNT: 12.9 % (ref 11.6–15.1)
GFR SERPL CREATININE-BSD FRML MDRD: 84 ML/MIN/1.73SQ M
GLUCOSE SERPL-MCNC: 88 MG/DL (ref 65–140)
HCT VFR BLD AUTO: 44.5 % (ref 36.5–49.3)
HGB BLD-MCNC: 14.7 G/DL (ref 12–17)
IMM GRANULOCYTES # BLD AUTO: 0.03 THOUSAND/UL (ref 0–0.2)
IMM GRANULOCYTES NFR BLD AUTO: 1 % (ref 0–2)
LYMPHOCYTES # BLD AUTO: 0.99 THOUSANDS/ÂΜL (ref 0.6–4.47)
LYMPHOCYTES NFR BLD AUTO: 17 % (ref 14–44)
MCH RBC QN AUTO: 30.2 PG (ref 26.8–34.3)
MCHC RBC AUTO-ENTMCNC: 33 G/DL (ref 31.4–37.4)
MCV RBC AUTO: 91 FL (ref 82–98)
MONOCYTES # BLD AUTO: 0.6 THOUSAND/ÂΜL (ref 0.17–1.22)
MONOCYTES NFR BLD AUTO: 11 % (ref 4–12)
NEUTROPHILS # BLD AUTO: 3.72 THOUSANDS/ÂΜL (ref 1.85–7.62)
NEUTS SEG NFR BLD AUTO: 64 % (ref 43–75)
NRBC BLD AUTO-RTO: 0 /100 WBCS
P AXIS: 47 DEGREES
PLATELET # BLD AUTO: 281 THOUSANDS/UL (ref 149–390)
PMV BLD AUTO: 9.8 FL (ref 8.9–12.7)
POTASSIUM SERPL-SCNC: 3.8 MMOL/L (ref 3.5–5.3)
PR INTERVAL: 152 MS
PROT SERPL-MCNC: 7.7 G/DL (ref 6.4–8.4)
QRS AXIS: 77 DEGREES
QRSD INTERVAL: 80 MS
QT INTERVAL: 344 MS
QTC INTERVAL: 406 MS
RBC # BLD AUTO: 4.87 MILLION/UL (ref 3.88–5.62)
SODIUM SERPL-SCNC: 135 MMOL/L (ref 135–147)
T WAVE AXIS: 35 DEGREES
VENTRICULAR RATE: 84 BPM
WBC # BLD AUTO: 5.71 THOUSAND/UL (ref 4.31–10.16)

## 2023-05-22 RX ORDER — PREDNISONE 50 MG/1
50 TABLET ORAL DAILY
Qty: 5 TABLET | Refills: 0 | Status: SHIPPED | OUTPATIENT
Start: 2023-05-22 | End: 2023-06-01

## 2023-05-22 RX ORDER — CYCLOBENZAPRINE HCL 5 MG
5 TABLET ORAL 3 TIMES DAILY PRN
Qty: 9 TABLET | Refills: 0 | Status: SHIPPED | OUTPATIENT
Start: 2023-05-22

## 2023-05-22 NOTE — ED PROVIDER NOTES
History  Chief Complaint   Patient presents with   • Chest Pain     Pt here with cp states his entire chest and center of back feels like needles stabbing him  History provided by:  Patient  Chest Pain  Pain location:  Substernal area  Pain quality comment:  Pins-and-needles  Pain radiates to:  Upper back  Pain radiates to the back: no    Pain severity:  Moderate  Onset quality:  Gradual  Duration:  3 days  Timing:  Constant  Progression:  Waxing and waning  Chronicity:  New  Context: at rest    Context: not breathing, no drug use, not eating, not lifting, no movement, not raising an arm, no stress and no trauma    Relieved by:  Nothing  Worsened by:  Nothing tried  Ineffective treatments:  None tried  Associated symptoms: no abdominal pain, no AICD problem, no altered mental status, no anorexia, no back pain, no cough, no dizziness, no dysphagia, no fatigue, no fever, no nausea, no numbness, no orthopnea, no palpitations, no PND, no shortness of breath and not vomiting        Prior to Admission Medications   Prescriptions Last Dose Informant Patient Reported? Taking?    Cholecalciferol (VITAMIN D PO)   Yes No   Sig: Take 5,000 Units by mouth daily    acetaminophen (TYLENOL) 325 mg tablet   Yes No   Sig: Take 650 mg by mouth every 6 (six) hours as needed for mild pain   albuterol (PROVENTIL HFA,VENTOLIN HFA) 90 mcg/act inhaler   Yes No   Sig: Inhale 2 puffs every 6 (six) hours as needed for wheezing   cholecalciferol (VITAMIN D3) 1,000 units tablet   Yes No   Sig: Take 2,000 Units by mouth daily   fluticasone (FLONASE) 50 mcg/act nasal spray   Yes No   Si sprays into each nostril daily    Patient not taking: Reported on 2021   predniSONE 10 mg tablet   Yes No   Sig: if needed   Patient not taking: Reported on 2022      Facility-Administered Medications: None       Past Medical History:   Diagnosis Date   • Renal failure        Past Surgical History:   Procedure Laterality Date   • TONSILLECTOMY AND ADENOIDECTOMY         Family History   Family history unknown: Yes     I have reviewed and agree with the history as documented  E-Cigarette/Vaping     E-Cigarette/Vaping Substances     Social History     Tobacco Use   • Smoking status: Never   • Smokeless tobacco: Never   Substance Use Topics   • Alcohol use: No   • Drug use: No       Review of Systems   Constitutional: Negative for activity change, appetite change, chills, fatigue and fever  HENT: Negative for congestion, ear discharge, ear pain, postnasal drip, rhinorrhea, sore throat and trouble swallowing  Respiratory: Negative for cough, chest tightness, shortness of breath and wheezing  Cardiovascular: Positive for chest pain  Negative for palpitations, orthopnea and PND  Gastrointestinal: Negative for abdominal pain, anorexia, nausea and vomiting  Genitourinary: Negative for dysuria, frequency, hematuria and urgency  Musculoskeletal: Negative for back pain  Skin: Negative for color change, pallor and rash  Neurological: Negative for dizziness and numbness  Psychiatric/Behavioral: Negative for confusion  All other systems reviewed and are negative  Physical Exam  Physical Exam  Vitals and nursing note reviewed  Constitutional:       General: He is not in acute distress  Appearance: He is well-developed and normal weight  He is not ill-appearing, toxic-appearing or diaphoretic  HENT:      Head: Normocephalic and atraumatic  Neck:      Thyroid: No thyromegaly  Vascular: No hepatojugular reflux  Trachea: No tracheal deviation  Cardiovascular:      Rate and Rhythm: Normal rate and regular rhythm  Heart sounds: Normal heart sounds  Pulmonary:      Effort: Pulmonary effort is normal       Breath sounds: Normal breath sounds  Chest:      Chest wall: Tenderness present  Comments: Midsternal area reproduces pins and needle sensation  Abdominal:      Palpations: Abdomen is soft     Musculoskeletal: Cervical back: Neck supple  Lymphadenopathy:      Cervical: No cervical adenopathy  Skin:     General: Skin is warm  Capillary Refill: Capillary refill takes less than 2 seconds  Neurological:      General: No focal deficit present  Mental Status: He is alert and oriented to person, place, and time     Psychiatric:         Mood and Affect: Mood normal          Behavior: Behavior normal          Vital Signs  ED Triage Vitals   Temperature Pulse Respirations Blood Pressure SpO2   05/22/23 1044 05/22/23 1044 05/22/23 1044 05/22/23 1044 05/22/23 1044   98 °F (36 7 °C) 90 18 136/82 96 %      Temp src Heart Rate Source Patient Position - Orthostatic VS BP Location FiO2 (%)   -- 05/22/23 1044 05/22/23 1130 05/22/23 1130 --    Monitor Sitting Right arm       Pain Score       05/22/23 1044       3           Vitals:    05/22/23 1044 05/22/23 1130 05/22/23 1200   BP: 136/82 113/78 118/79   Pulse: 90 82 75   Patient Position - Orthostatic VS:  Sitting Sitting         Visual Acuity      ED Medications  Medications - No data to display    Diagnostic Studies  Results Reviewed     Procedure Component Value Units Date/Time    HS Troponin 0hr (reflex protocol) [[id number]]  (Normal) Collected: 05/22/23 1056    Lab Status: Final result Specimen: Blood from Arm, Left Updated: 05/22/23 1126     hs TnI 0hr 3 ng/L     Comprehensive metabolic panel [[id number]] Collected: 05/22/23 1056    Lab Status: Final result Specimen: Blood from Arm, Left Updated: 05/22/23 1125     Sodium 135 mmol/L      Potassium 3 8 mmol/L      Chloride 102 mmol/L      CO2 27 mmol/L      ANION GAP 6 mmol/L      BUN 16 mg/dL      Creatinine 1 12 mg/dL      Glucose 88 mg/dL      Calcium 9 6 mg/dL      AST 20 U/L      ALT 15 U/L      Alkaline Phosphatase 53 U/L      Total Protein 7 7 g/dL      Albumin 4 9 g/dL      Total Bilirubin 0 47 mg/dL      eGFR 84 ml/min/1 73sq m     Narrative:      [place] guidelines for Chronic Kidney Disease (CKD):   •  Stage 1 with normal or high GFR (GFR > 90 mL/min/1 73 square meters)  •  Stage 2 Mild CKD (GFR = 60-89 mL/min/1 73 square meters)  •  Stage 3A Moderate CKD (GFR = 45-59 mL/min/1 73 square meters)  •  Stage 3B Moderate CKD (GFR = 30-44 mL/min/1 73 square meters)  •  Stage 4 Severe CKD (GFR = 15-29 mL/min/1 73 square meters)  •  Stage 5 End Stage CKD (GFR <15 mL/min/1 73 square meters)  Note: GFR calculation is accurate only with a steady state creatinine    CBC and differential [[id number]] Collected: 05/22/23 1056    Lab Status: Final result Specimen: Blood from Arm, Left Updated: 05/22/23 1105     WBC 5 71 Thousand/uL      RBC 4 87 Million/uL      Hemoglobin 14 7 g/dL      Hematocrit 44 5 %      MCV 91 fL      MCH 30 2 pg      MCHC 33 0 g/dL      RDW 12 9 %      MPV 9 8 fL      Platelets 686 Thousands/uL      nRBC 0 /100 WBCs      Neutrophils Relative 64 %      Immat GRANS % 1 %      Lymphocytes Relative 17 %      Monocytes Relative 11 %      Eosinophils Relative 6 %      Basophils Relative 1 %      Neutrophils Absolute 3 72 Thousands/µL      Immature Grans Absolute 0 03 Thousand/uL      Lymphocytes Absolute 0 99 Thousands/µL      Monocytes Absolute 0 60 Thousand/µL      Eosinophils Absolute 0 33 Thousand/µL      Basophils Absolute 0 04 Thousands/µL                  XR chest 1 view portable   ED Interpretation by Meseret Brantley PA-C (05/22 1200)   No acute cardiopulmonary disease      Final Result by Ria Nguyen MD (05/22 7079)      No acute cardiopulmonary disease                    Workstation performed: [id number]                    Procedures  ECG 12 Lead Documentation Only    Date/Time: 5/22/2023 11:54 AM  Performed by: Meseret Brantley PA-C  Authorized by: Meseret Brantley PA-C     Indications / Diagnosis:  Chest pain  ECG reviewed by me, the ED Provider: yes    Patient location:  ED  Previous ECG:     Previous ECG:  Compared to current    Comparison ECG info: March 19, 2023    Similarity:  No change    Comparison to cardiac monitor: Yes    Interpretation:     Interpretation: normal    Rate:     ECG rate:  73    ECG rate assessment: normal    Rhythm:     Rhythm: sinus rhythm    Ectopy:     Ectopy: none    QRS:     QRS axis:  Normal    QRS intervals:  Normal  Conduction:     Conduction: normal    ST segments:     ST segments:  Normal  T waves:     T waves: normal    Comments:      No acute cardiopulmonary disease    Independently interpreted by me             ED Course             HEART Risk Score    Flowsheet Row Most Recent Value   Heart Score Risk Calculator    History 0 Filed at: 05/22/2023 1157   ECG 0 Filed at: 05/22/2023 1157   Age 0 Filed at: 05/22/2023 1157   Risk Factors 0 Filed at: 05/22/2023 1157   Troponin 0 Filed at: 05/22/2023 1157   HEART Score 0 Filed at: 05/22/2023 5[address]  This 22-year-old male presents emergency room with an onset of chest pain on Saturday  He describes it as pins-and-needles and a tingling sensation over his midsternal area  He can reproduce it with palpation  He states it radiates to his back  He denies any associated nausea or shortness of breath  He denies any injuries  He denies any coughing, nasal congestion, postnasal drip, sore throat  He denies any abdominal pain, black tarry stools or bright red blood per rectum  He denies any dysuria, frequency, urgency, hematuria  He has a positive family history of grandparents having myocardial infarctions and strokes  He is not a smoker  He denies any street drug use  He does not drink alcohol  He has never had a stress test before  Past medical history of renal insufficiency as one of his kidneys is not functioning  He is developmentally delayed    He follows with a nephrologist     Differential diagnosis includes but is not limited to acute coronary syndrome, myocarditis, pericarditis, GERD, esophagitis, pulmonary embolism, aortic dissection, chest wall strain, anxiety, costochondritis  Physical exam-  29-year-old white male is alert and oriented x3  He is in no acute distress  Neck is supple upon palpation without thyroidomegaly, lymphadenopathy  There is no nuchal rigidity  His lungs are clear to auscultation and percussion without rales rhonchi's or wheezes  The heart is regular rate and rhythm without murmur S3-S4  There is no reproducible chest pain upon palpation over the sternal area  The abdomen is soft nondistended nontender without mass or hepatosplenomegaly  There is no dependent edema  Laboratory studies were taken and were reviewed  His EKG does not show acute signs of ischemia  His chest x-ray does not demonstrate any acute cardiopulmonary disease  Heart score 0    Impression-  Atypical chest pain  Chest wall strain    Plan-  Patient was discharged home  He was instructed to take an anti-inflammatory medication as well as a muscle relaxant as directed  He will follow-up with his family physician for recheck exam in 2 to 3 days  He was given reasons to return to the emergency room should his symptoms worsen  Atypical chest pain: acute illness or injury  Muscle strain of chest wall, initial encounter: acute illness or injury  Amount and/or Complexity of Data Reviewed  Labs: ordered  Details: Independently interpreted by me  Radiology: ordered and independent interpretation performed  Details: Independently interpreted by me, no acute cardiopulmonary disease  ECG/medicine tests: ordered  Details: Independently interpreted by me, no acute signs of ischemia      Risk  Prescription drug management            Disposition  Final diagnoses:   Atypical chest pain   Muscle strain of chest wall, initial encounter     Time reflects when diagnosis was documented in both MDM as applicable and the Disposition within this note     Time User Action Codes Description Comment    5/22/2023 11:57 AM Navid Magallon Add [R07 89] Atypical chest pain     5/22/2023 12:09 PM Navid Magallon Add [P24 865K] Muscle strain of chest wall, initial encounter       ED Disposition     ED Disposition   Discharge    Condition   Stable    Date/Time   Mon May 22, 2023 12:10 PM    Comment   [address] discharge to home/self care  Follow-up Information     Follow up With Specialties Details Why Contact Info    25-10 30[facility] Schedule an appointment as soon as possible for a visit in 2 days  [address] [place] [id number] [phone]            Discharge Medication List as of 5/22/2023 12:11 PM      START taking these medications    Details   cyclobenzaprine (FLEXERIL) 5 mg tablet Take 1 tablet (5 mg total) by mouth 3 (three) times a day as needed for muscle spasms for up to 9 doses, Starting Mon 5/22/2023, Normal      !! predniSONE 50 mg tablet Take 1 tablet (50 mg total) by mouth daily for 10 days, Starting Mon 5/22/2023, Until Thu 6/1/2023, Normal       !! - Potential duplicate medications found  Please discuss with provider  CONTINUE these medications which have NOT CHANGED    Details   acetaminophen (TYLENOL) 325 mg tablet Take 650 mg by mouth every 6 (six) hours as needed for mild pain, Historical Med      albuterol (PROVENTIL HFA,VENTOLIN HFA) 90 mcg/act inhaler Inhale 2 puffs every 6 (six) hours as needed for wheezing, Historical Med      !! Cholecalciferol (VITAMIN D PO) Take 5,000 Units by mouth daily , Historical Med      !! cholecalciferol (VITAMIN D3) 1,000 units tablet Take 2,000 Units by mouth daily, Historical Med      fluticasone (FLONASE) 50 mcg/act nasal spray 2 sprays into each nostril daily , Starting Fri 9/21/2018, Until Sat 9/21/2019, Historical Med      !! predniSONE 10 mg tablet if needed, Starting Sun 8/28/2022, Historical Med       !! - Potential duplicate medications found  Please discuss with provider            No discharge procedures on file      PDMP Review     None          ED Provider  Electronically Signed by           Kennedy Agee PA-C  05/22/23 2402

## 2023-05-22 NOTE — Clinical Note
Cristine Lana was seen and treated in our emergency department on 5/22/2023  No restrictions            Diagnosis:     Vinay    He may return on this date: 05/24/2023         If you have any questions or concerns, please don't hesitate to call        Sheri aMcedo MD    ______________________________           _______________          _______________  Hospital Representative                              Date                                Time

## 2023-06-08 ENCOUNTER — TELEPHONE (OUTPATIENT)
Dept: NEPHROLOGY | Facility: CLINIC | Age: 38
End: 2023-06-08

## 2023-06-08 NOTE — TELEPHONE ENCOUNTER
Patient called and stated that he went to [facility] and they gave him meloxicam  Patient is wondering if it is safe for him to take  Please advise

## 2023-06-08 NOTE — TELEPHONE ENCOUNTER
Please tell patient that given his underlying chronic kidney disease we recommend to avoid NSAIDs as much as possible (ibuprofen, Motrin, Advil, Aleve, naproxen, diclofenac, meloxicam)  Okay to take Tylenol or acetaminophen as needed for pain    Thanks,

## 2023-06-08 NOTE — TELEPHONE ENCOUNTER
Called patient and went over the follow information: Please tell patient that given his underlying chronic kidney disease we recommend to avoid NSAIDs as much as possible (ibuprofen, Motrin, Advil, Aleve, naproxen, diclofenac, meloxicam)  Okay to take Tylenol or acetaminophen as needed for pain  Patient said will not take Meloxicam  No further questions at this time

## 2023-08-02 ENCOUNTER — CLINICAL SUPPORT (OUTPATIENT)
Dept: NUTRITION | Facility: HOSPITAL | Age: 38
End: 2023-08-02
Payer: COMMERCIAL

## 2023-08-02 VITALS — HEIGHT: 67 IN | WEIGHT: 177 LBS | BODY MASS INDEX: 27.78 KG/M2

## 2023-08-02 DIAGNOSIS — N18.31 STAGE 3A CHRONIC KIDNEY DISEASE (HCC): ICD-10-CM

## 2023-08-02 PROCEDURE — 97803 MED NUTRITION INDIV SUBSEQ: CPT

## 2023-08-02 NOTE — PROGRESS NOTES
Follow-Up Nutrition Assessment Form    Patient Name: Cecilia Peralta    YOB: 1985    Sex: Male      Follow Up Date: 8/2/2023  Start Time: 11:30am Stop Time: 12:30pm Total Minutes: 30 mins     Data:  Present at session: self   Parent/Patient Concerns: PT is interested in looking to lose 10lbs, hoping to prevent any further hospitalizations, recently was admitted for high blood pressure   Medical Dx/Reason for Referral: N18.31 CKD3   Past Medical History:   Diagnosis Date   • Renal failure        Current Outpatient Medications   Medication Sig Dispense Refill   • acetaminophen (TYLENOL) 325 mg tablet Take 650 mg by mouth every 6 (six) hours as needed for mild pain     • albuterol (PROVENTIL HFA,VENTOLIN HFA) 90 mcg/act inhaler Inhale 2 puffs every 6 (six) hours as needed for wheezing     • Cholecalciferol (VITAMIN D PO) Take 5,000 Units by mouth daily      • cholecalciferol (VITAMIN D3) 1,000 units tablet Take 2,000 Units by mouth daily     • cyclobenzaprine (FLEXERIL) 5 mg tablet Take 1 tablet (5 mg total) by mouth 3 (three) times a day as needed for muscle spasms for up to 9 doses 9 tablet 0   • fluticasone (FLONASE) 50 mcg/act nasal spray 2 sprays into each nostril daily  (Patient not taking: Reported on 11/19/2021)     • predniSONE 10 mg tablet if needed (Patient not taking: Reported on 11/17/2022)       No current facility-administered medications for this visit.         Additional Meds/Supplements:    Barriers to Learning:    Labs:    Height: Ht Readings from Last 3 Encounters:   08/02/23 5' 7" (1.702 m)   05/22/23 5' 7" (1.702 m)   04/12/23 5' 7" (1.702 m)      Weight: Wt Readings from Last 10 Encounters:   08/02/23 80.3 kg (177 lb)   05/22/23 84 kg (185 lb 3 oz)   04/12/23 81.8 kg (180 lb 6.4 oz)   03/19/23 85.1 kg (187 lb 9.8 oz)   01/16/23 82.6 kg (182 lb 1.6 oz)   11/17/22 83.5 kg (184 lb)   09/22/22 83.5 kg (184 lb)   08/16/22 83.5 kg (184 lb)   05/31/22 82.2 kg (181 lb 3.2 oz)   11/19/21 82.6 kg (182 lb)     Estimated body mass index is 27.72 kg/m² as calculated from the following:    Height as of this encounter: 5' 7" (1.702 m). Weight as of this encounter: 80.3 kg (177 lb). Wt. Change Since Last Visit: [x]Yes     []No  Amount:  3lbs/1.6% wt. Loss x 4 months, 8lbs/4.3% wt. Loss x 3 months      Energy Needs:    Pain Screen: Are you having pain now? Previous Goals or Goals Achieved:      1. Avoid skipping breakfast, consume 3 balanced meals at least 2 of the meals using the plate method by f/u- 4/12 meeting, has been consuming eggs, wheat bread, or cereal with 1% milk, continue, 8/2 1 egg, with wheat toast, 1%milk, coffee or cereal captain crunch, cheerios, gold apolinar with 1% milk, continue   2. Replace high salty snacks with fresh fruit or vegetables or lower sodium snacks by f/u-4/12 improving has been consuming low sodium crackers, has been cutting back on doritos, pringles, fritos and looking at labels more, continue, 8/2 met continues to use low sodium, or no salt add crackers, but not consuming much of fruits/vegetables as snack, still eats corn chips, doritos but has decreased amount, continue f/u    3. Aim for 2-4 lb weight loss x 1 month by f/u- 4/12 met, lost 8lbs/ from last month, continue to lose weight to a normal BMI range by f/u, 8/2 not met, weight increased in May but then back down overall lost 3lbs in 4 month, continue by f/u     4. Participate in physical activity at least 30-60 mins 3x week by f/u- 8/2 not met has been walking 1 lap around a park and then plays basketball for about 30-45 mins 1 x week, continue by f/u       New Goals:   1.  Limit to 2000mg of Na a day, limit processed meats, frozen pizza, canned beans and sauces, read food labels to monitor by f/u   2.   3.       Initial PES: Food and nutrition related knowledge deficit  related to Lack of prior exposure to accurate nutrition related information as evidenced by Conditions associated with diagnosis or treatment -continue      New PES:       New Problem List:  1. Recent hospitalization for HTN   2.   3.       Assessment:  Lena Torres presents for nutrition counseling f/u. Lena Torres is interested in losing 10lbs and would like to continue to try to make good eating habit choices to help prevent complications with diseases, concerned recently was admitted for high blood pressure, his goal is to get off medication. Discussed the importance of limiting sodium and adequate physical activity. Provided diet education on foods high in sodium to limit, and provided alternate suggestions, and encouraged reading food labels. Encouraged balanced meals, lean protein, increasing fruit and vegetable intake and consuming whole grains. Encouraged 150 mins of moderate activity weekly, discussed importance of variety of activity, including wt bearing activities 2-3x/wk x 10-15mins.  Discussed importance of activity throughout the lifecycle and its impact on overall health/stress management, etc.      Medical Nutrition Therapy Intervention:  [x]Individualized Meal Plan 2g Na diet,  []Understanding Lab Values   []Basic Pathophysiology of Disease []Food/Medication Interactions   []Food Diary [x]Exercise   []Lifestyle/Behavior Modification Techniques []Medication, Mechanism of Action   [x]Label Reading []Self Blood Glucose Monitoring   []Weight/BMI Goals []Other -    Other Notes:        Comprehension: []Excellent  []Very Good  [x]Good  []Fair   []Poor    Receptivity: []Excellent  []Very Good  [x]Good  []Fair   []Poor    Expected Compliance: []Excellent  []Very Good  [x]Good  []Fair   []Poor      Labs:  CMP  Lab Results   Component Value Date     08/01/2015    K 3.8 05/22/2023     05/22/2023    CO2 27 05/22/2023    ANIONGAP 7 08/01/2015    BUN 16 05/22/2023    CREATININE 1.12 05/22/2023    GLUCOSE 87 08/01/2015    GLUF 84 11/11/2022    CALCIUM 9.6 05/22/2023    AST 20 05/22/2023    ALT 15 05/22/2023    ALKPHOS 53 05/22/2023    EGFR 84 05/22/2023       BMP  Lab Results   Component Value Date    GLUCOSE 87 08/01/2015    CALCIUM 9.6 05/22/2023     08/01/2015    K 3.8 05/22/2023    CO2 27 05/22/2023     05/22/2023    BUN 16 05/22/2023    CREATININE 1.12 05/22/2023       Lipids  Lab Results   Component Value Date    CHOL 197 09/24/2015     Lab Results   Component Value Date    HDL 41 02/24/2019    HDL 37 (L) 07/31/2016    HDL 43 09/24/2015     Lab Results   Component Value Date    LDLCALC 142 (H) 02/24/2019    LDLCALC 123 (H) 07/31/2016    LDLCALC 134 (H) 09/24/2015     Lab Results   Component Value Date    TRIG 115 02/24/2019    TRIG 96 07/31/2016    TRIG 101 09/24/2015     No results found for: "CHOLHDL"    Hemoglobin A1C  No results found for: "HGBA1C"    Fasting Glucose  Lab Results   Component Value Date    GLUF 84 11/11/2022       Insulin     Thyroid  No results found for: "TSH", "C9VIZMI", "L9FMJEB", "THYROIDAB"    Hepatic Function Panel  Lab Results   Component Value Date    ALT 15 05/22/2023    AST 20 05/22/2023    ALKPHOS 53 05/22/2023       Celiac Disease Antibody Panel  No results found for: "ENDOMYSIAL IGA", "GLIADIN IGA", "GLIADIN IGG", "IGA", "TISSUE TRANSGLUT AB", "TTG IGA"   Iron  No results found for: "IRON", "TIBC", "FERRITIN"    Vitamins  No results found for: "VITAMIN B2"   No results found for: "NICOTINAMIDE", "NICOTINIC ACID"   No results found for: "VITAMINB6"  No results found for: "GRHKPCZZ64"  No results found for: "VITB5"  No results found for: "S5KMNEVU"  No results found for: "[place]"  No results found for: "VITAMIN K"   No results found for: "25-HYDROXY VIT D"   No components found for: "VITAMINE"     No follow-ups on file.     Skye Anderson, [address] [address]

## 2023-10-24 ENCOUNTER — OFFICE VISIT (OUTPATIENT)
Dept: UROLOGY | Facility: AMBULATORY SURGERY CENTER | Age: 38
End: 2023-10-24
Payer: COMMERCIAL

## 2023-10-24 VITALS
HEART RATE: 85 BPM | SYSTOLIC BLOOD PRESSURE: 120 MMHG | DIASTOLIC BLOOD PRESSURE: 90 MMHG | WEIGHT: 185 LBS | BODY MASS INDEX: 28.98 KG/M2 | OXYGEN SATURATION: 99 %

## 2023-10-24 DIAGNOSIS — Q62.39 UPJ OBSTRUCTION, CONGENITAL: Primary | ICD-10-CM

## 2023-10-24 PROCEDURE — 99213 OFFICE O/P EST LOW 20 MIN: CPT | Performed by: UROLOGY

## 2023-10-24 RX ORDER — CHOLECALCIFEROL (VITAMIN D3) 125 MCG
500 CAPSULE ORAL DAILY
COMMUNITY

## 2023-10-24 NOTE — PROGRESS NOTES
10/24/2023    Vinay Gray [facility]  1985  [id number]        Assessment  History of right UPJ obstruction, MAG3 renal scan with Lasix from March 2022 revealed 99% function of the left kidney and 1% function of the right, nonfunctioning right kidney      Discussion    We discussed that he is asymptomatic and that he does not have right-sided flank pain. We reviewed the scan which shows 1% function from the right kidney which unfortunately is permanent and irreversible at this time. I recommend that he continue to follow with nephrology with a yearly BMP and can return to urology on an as-needed basis. The patient and his mother are amenable with this plan. History of Present Illness  30 y.o. male with a history of right UPJ obstruction previously known to Dr. Lloyd Yarbrough. The mother states that many years ago Dr. Lloyd Yarbrough had offered and recommended surgery for his UPJ obstruction but this was never performed. When I recently met him in the spring 2022 I obtained a MAG3 renal scan with Lasix which showed 99% function from the left kidney and only 1% function from the right. He is asymptomatic at this time. He denies any flank pain. He has occasional urgency and frequency of urination. AUA Symptom Score      Review of Systems  Review of Systems   Constitutional: Negative. HENT: Negative. Eyes: Negative. Respiratory: Negative. Cardiovascular: Negative. Gastrointestinal: Negative. Endocrine: Negative. Genitourinary:         Per HPI   Musculoskeletal: Negative. Skin: Negative. Allergic/Immunologic: Negative. Neurological: Negative. Hematological: Negative. Psychiatric/Behavioral: Negative. Past Medical History  Past Medical History:   Diagnosis Date    Renal failure        Past Social History  Past Surgical History:   Procedure Laterality Date    TONSILLECTOMY AND ADENOIDECTOMY         Past Family History  Family History   Family history unknown:  Yes Past Social history  Social History     Socioeconomic History    Marital status: Single     Spouse name: Not on file    Number of children: Not on file    Years of education: Not on file    Highest education level: Not on file   Occupational History    Not on file   Tobacco Use    Smoking status: Never    Smokeless tobacco: Never   Substance and Sexual Activity    Alcohol use: No    Drug use: No    Sexual activity: Not Currently   Other Topics Concern    Not on file   Social History Narrative    Not on file     Social Determinants of Health     Financial Resource Strain: Not on file   Food Insecurity: Not on file   Transportation Needs: Not on file   Physical Activity: Not on file   Stress: Not on file   Social Connections: Not on file   Intimate Partner Violence: Not on file   Housing Stability: Not on file       Current Medications  Current Outpatient Medications   Medication Sig Dispense Refill    acetaminophen (TYLENOL) 325 mg tablet Take 650 mg by mouth every 6 (six) hours as needed for mild pain      albuterol (PROVENTIL HFA,VENTOLIN HFA) 90 mcg/act inhaler Inhale 2 puffs every 6 (six) hours as needed for wheezing      Cholecalciferol (VITAMIN D PO) Take 5,000 Units by mouth daily       cholecalciferol (VITAMIN D3) 1,000 units tablet Take 2,000 Units by mouth daily      vitamin B-12 (VITAMIN B-12) 500 mcg tablet Take 500 mcg by mouth daily      cyclobenzaprine (FLEXERIL) 5 mg tablet Take 1 tablet (5 mg total) by mouth 3 (three) times a day as needed for muscle spasms for up to 9 doses (Patient not taking: Reported on 10/24/2023) 9 tablet 0    fluticasone (FLONASE) 50 mcg/act nasal spray 2 sprays into each nostril daily  (Patient not taking: Reported on 11/19/2021)      predniSONE 10 mg tablet if needed (Patient not taking: Reported on 11/17/2022)       No current facility-administered medications for this visit.        Allergies  Allergies   Allergen Reactions    Sulfa Antibiotics        Past Medical History, Social History, Family History, medications and allergies were reviewed. Vitals  Vitals:    10/24/23 1107   BP: 120/90   BP Location: Left arm   Patient Position: Sitting   Cuff Size: Large   Pulse: 85   SpO2: 99%   Weight: 83.9 kg (185 lb)       Physical Exam  Physical Exam  On examination he is in no acute distress. His abdomen is soft nontender nondistended.  examination reveals no CVA tenderness. Skin is warm. Extremities without edema.   Neurologic is grossly intact and nonfocal.      Results  No results found for: "PSA"  Lab Results   Component Value Date    GLUCOSE 87 08/01/2015    CALCIUM 9.6 05/22/2023     08/01/2015    K 3.8 05/22/2023    CO2 27 05/22/2023     05/22/2023    BUN 16 05/22/2023    CREATININE 1.12 05/22/2023     Lab Results   Component Value Date    WBC 5.71 05/22/2023    HGB 14.7 05/22/2023    HCT 44.5 05/22/2023    MCV 91 05/22/2023     05/22/2023         Office Urine Dip  No results found for this or any previous visit (from the past 1 hour(s)).]

## 2023-11-02 DIAGNOSIS — Q62.11 HYDRONEPHROSIS WITH URETEROPELVIC JUNCTION (UPJ) OBSTRUCTION: ICD-10-CM

## 2023-11-02 DIAGNOSIS — N18.31 STAGE 3A CHRONIC KIDNEY DISEASE (HCC): Primary | ICD-10-CM

## 2023-11-07 ENCOUNTER — CLINICAL SUPPORT (OUTPATIENT)
Dept: NUTRITION | Facility: HOSPITAL | Age: 38
End: 2023-11-07
Payer: COMMERCIAL

## 2023-11-07 VITALS — HEIGHT: 67 IN | BODY MASS INDEX: 28.83 KG/M2 | WEIGHT: 183.7 LBS

## 2023-11-07 DIAGNOSIS — N18.31 STAGE 3A CHRONIC KIDNEY DISEASE (HCC): Primary | ICD-10-CM

## 2023-11-07 PROCEDURE — 97803 MED NUTRITION INDIV SUBSEQ: CPT

## 2023-11-07 NOTE — PROGRESS NOTES
Follow-Up Nutrition Assessment Form    Patient Name: Ann Mcfarland    YOB: 1985    Sex: Male      Follow Up Date: 11/7/2023  Start Time: 10:30am Stop Time: 11am Total Minutes: 30 mins     Data:  Present at session: self   Parent/Patient Concerns: Struggling with cravings, such as ice cream, snacks [facility]   Medical Dx/Reason for Referral: N18.31, CKD3   Past Medical History:   Diagnosis Date    Renal failure        Current Outpatient Medications   Medication Sig Dispense Refill    acetaminophen (TYLENOL) 325 mg tablet Take 650 mg by mouth every 6 (six) hours as needed for mild pain      albuterol (PROVENTIL HFA,VENTOLIN HFA) 90 mcg/act inhaler Inhale 2 puffs every 6 (six) hours as needed for wheezing      Cholecalciferol (VITAMIN D PO) Take 5,000 Units by mouth daily       cholecalciferol (VITAMIN D3) 1,000 units tablet Take 2,000 Units by mouth daily      cyclobenzaprine (FLEXERIL) 5 mg tablet Take 1 tablet (5 mg total) by mouth 3 (three) times a day as needed for muscle spasms for up to 9 doses (Patient not taking: Reported on 10/24/2023) 9 tablet 0    fluticasone (FLONASE) 50 mcg/act nasal spray 2 sprays into each nostril daily  (Patient not taking: Reported on 11/19/2021)      predniSONE 10 mg tablet if needed (Patient not taking: Reported on 11/17/2022)      vitamin B-12 (VITAMIN B-12) 500 mcg tablet Take 500 mcg by mouth daily       No current facility-administered medications for this visit.         Additional Meds/Supplements:    Barriers to Learning:    Labs:    Height: Ht Readings from Last 3 Encounters:   11/07/23 5' 7" (1.702 m)   08/02/23 5' 7" (1.702 m)   05/22/23 5' 7" (1.702 m)      Weight: Wt Readings from Last 10 Encounters:   11/07/23 83.3 kg (183 lb 11.2 oz)   10/24/23 83.9 kg (185 lb)   08/02/23 80.3 kg (177 lb)   05/22/23 84 kg (185 lb 3 oz)   04/12/23 81.8 kg (180 lb 6.4 oz)   03/19/23 85.1 kg (187 lb 9.8 oz)   01/16/23 82.6 kg (182 lb 1.6 oz)   11/17/22 83.5 kg (184 lb) 09/22/22 83.5 kg (184 lb)   08/16/22 83.5 kg (184 lb)     Estimated body mass index is 28.77 kg/m² as calculated from the following:    Height as of this encounter: 5' 7" (1.702 m). Weight as of this encounter: 83.3 kg (183 lb 11.2 oz). Wt. Change Since Last Visit: [x]Yes     []No  Amount: 6lbs3% gain x 3 months      Energy Needs: [address] Equation: x1.4 minus 500-1000cal= 1416-1916cal, 67-83g .8-1g/kg   Pain Screen: Are you having pain now? no      Previous Goals or Goals Achieved:     1. Avoid skipping breakfast, consume 3 balanced meals at least 2 of the meals using the plate method by f/u- 4/12 meeting, has been consuming eggs, wheat bread, or cereal with 1% milk, continue, 8/2 1 egg, with wheat toast, 1%milk, coffee or cereal captain crunch, cheerios, gold apolinar with 1% milk, continue, 11/7 met/not met, continues to consume breakfast, 1 egg/sometimes bread, coffee, water, has had challenges with following My plate for meals, continue by f/u   2. Replace high salty snacks with fresh fruit or vegetables or lower sodium snacks by f/u-4/12 improving has been consuming low sodium crackers, has been cutting back on doritos, pringles, fritos and looking at labels more, continue, 8/2 met continues to use low sodium, or no salt add crackers, but not consuming much of fruits/vegetables as snack, still eats corn chips, doritos but has decreased amount, continue f/u, 11/7 not met, continue by f/u    3. Aim for 2-4 lb weight loss x 1 month by f/u- 4/12 met, lost 8lbs/ from last month, continue to lose weight to a normal BMI range by f/u, 8/2 not met, weight increased in May but then back down overall lost 3lbs in 4 month, continue by f/u, 11/7 not met, continue by f/u      4.  Participate in physical activity at least 30-60 mins 3x week by f/u- 8/2 not met has been walking 1 lap around a park and then plays basketball for about 30-45 mins 1 x week, continue by f/u, 11/7 met, continue by f/u         New Goals: 1. Limit to 2000mg of Na a day, limit processed meats, frozen pizza, canned beans and sauces, read food labels to monitor by f/u, 11/7 not met, continue by f/u     New Goals:   1.   2.   3.       Initial PES: Food and nutrition related knowledge deficit  related to Lack of prior exposure to accurate nutrition related information as evidenced by Conditions associated with diagnosis or treatment -continue       New PES:       New Problem List:  1.   2.   3.       Assessment:  Larry Call presents today for f/u. Reported having trouble with cravings, has been consuming more snacks such as doritos and ice cream, has not been getting in much of fruits and vegetables. Provided ideas to help increase fruit and vegetable intake. Discussed tips for decreasing sodium intake. Encouraged physical activity.       Medical Nutrition Therapy Intervention:  [x]Individualized Meal Plan- 2gm Na diet, 3 balanced meals using MyPlate []Understanding Lab Values   []Basic Pathophysiology of Disease []Food/Medication Interactions   []Food Diary [x]Exercise- 30 minutes of physical activity daily, incorporating weight bearing excercises   []Lifestyle/Behavior Modification Techniques []Medication, Mechanism of Action   [x]Label Reading- Sodium []Self Blood Glucose Monitoring   [x]Weight/BMI Goals- 2-4lbs a month []Other -    Other Notes:        Comprehension: []Excellent  []Very Good  [x]Good  []Fair   []Poor    Receptivity: []Excellent  []Very Good  [x]Good  []Fair   []Poor    Expected Compliance: []Excellent  []Very Good  [x]Good  []Fair   []Poor      Labs:  CMP  Lab Results   Component Value Date     08/01/2015    K 3.8 05/22/2023     05/22/2023    CO2 27 05/22/2023    ANIONGAP 7 08/01/2015    BUN 16 05/22/2023    CREATININE 1.12 05/22/2023    GLUCOSE 87 08/01/2015    GLUF 84 11/11/2022    CALCIUM 9.6 05/22/2023    AST 20 05/22/2023    ALT 15 05/22/2023    ALKPHOS 53 05/22/2023    EGFR 84 05/22/2023       BMP  Lab Results Component Value Date    GLUCOSE 87 08/01/2015    CALCIUM 9.6 05/22/2023     08/01/2015    K 3.8 05/22/2023    CO2 27 05/22/2023     05/22/2023    BUN 16 05/22/2023    CREATININE 1.12 05/22/2023       Lipids  Lab Results   Component Value Date    CHOL 197 09/24/2015     Lab Results   Component Value Date    HDL 41 02/24/2019    HDL 37 (L) 07/31/2016    HDL 43 09/24/2015     Lab Results   Component Value Date    LDLCALC 142 (H) 02/24/2019    LDLCALC 123 (H) 07/31/2016    LDLCALC 134 (H) 09/24/2015     Lab Results   Component Value Date    TRIG 115 02/24/2019    TRIG 96 07/31/2016    TRIG 101 09/24/2015     No results found for: "CHOLHDL"    Hemoglobin A1C  No results found for: "HGBA1C"    Fasting Glucose  Lab Results   Component Value Date    GLUF 84 11/11/2022       Insulin     Thyroid  No results found for: "TSH", "I3YXRMB", "L2OAAGM", "THYROIDAB"    Hepatic Function Panel  Lab Results   Component Value Date    ALT 15 05/22/2023    AST 20 05/22/2023    ALKPHOS 53 05/22/2023       Celiac Disease Antibody Panel  No results found for: "ENDOMYSIAL IGA", "GLIADIN IGA", "GLIADIN IGG", "IGA", "TISSUE TRANSGLUT AB", "TTG IGA"   Iron  No results found for: "IRON", "TIBC", "FERRITIN"    Vitamins  No results found for: "VITAMIN B2"   No results found for: "NICOTINAMIDE", "NICOTINIC ACID"   No results found for: "VITAMINB6"  No results found for: "OIFIXMOO88"  No results found for: "VITB5"  No results found for: "Q2AIHKSZ"  No results found for: "Miriam Boogie"  No results found for: "VITAMIN K"   No results found for: "25-HYDROXY VIT D"   No components found for: "VITAMINE"     No follow-ups on file.     Jesse Salgado, [address] [id number]

## 2023-11-08 ENCOUNTER — TELEPHONE (OUTPATIENT)
Dept: NEPHROLOGY | Facility: CLINIC | Age: 38
End: 2023-11-08

## 2023-11-08 NOTE — TELEPHONE ENCOUNTER
Called patient reminding to please complete labwork prior to 11/14 appointment with Dr. Louie Fernandez.

## 2023-11-10 ENCOUNTER — APPOINTMENT (OUTPATIENT)
Dept: LAB | Facility: CLINIC | Age: 38
End: 2023-11-10
Payer: COMMERCIAL

## 2023-11-10 LAB
ANION GAP SERPL CALCULATED.3IONS-SCNC: 10 MMOL/L
BUN SERPL-MCNC: 15 MG/DL (ref 5–25)
CALCIUM SERPL-MCNC: 9.6 MG/DL (ref 8.4–10.2)
CHLORIDE SERPL-SCNC: 103 MMOL/L (ref 96–108)
CO2 SERPL-SCNC: 25 MMOL/L (ref 21–32)
CREAT SERPL-MCNC: 1.14 MG/DL (ref 0.6–1.3)
CREAT UR-MCNC: 23.8 MG/DL
GFR SERPL CREATININE-BSD FRML MDRD: 82 ML/MIN/1.73SQ M
GLUCOSE P FAST SERPL-MCNC: 92 MG/DL (ref 65–99)
MICROALBUMIN UR-MCNC: <7 MG/L
MICROALBUMIN/CREAT 24H UR: <29 MG/G CREATININE (ref 0–30)
POTASSIUM SERPL-SCNC: 4.3 MMOL/L (ref 3.5–5.3)
SODIUM SERPL-SCNC: 138 MMOL/L (ref 135–147)

## 2023-11-10 PROCEDURE — 80048 BASIC METABOLIC PNL TOTAL CA: CPT

## 2023-11-10 PROCEDURE — 82570 ASSAY OF URINE CREATININE: CPT

## 2023-11-10 PROCEDURE — 36415 COLL VENOUS BLD VENIPUNCTURE: CPT

## 2023-11-10 PROCEDURE — 82043 UR ALBUMIN QUANTITATIVE: CPT

## 2023-11-10 NOTE — TELEPHONE ENCOUNTER
Left a voicemail to patient  reminding to please complete labwork prior to 11/14 appointment with Dr. Binu Delcid. Advised patient to call back with any questions or concerns.

## 2023-11-14 ENCOUNTER — OFFICE VISIT (OUTPATIENT)
Dept: NEPHROLOGY | Facility: CLINIC | Age: 38
End: 2023-11-14
Payer: COMMERCIAL

## 2023-11-14 VITALS
WEIGHT: 186 LBS | DIASTOLIC BLOOD PRESSURE: 81 MMHG | SYSTOLIC BLOOD PRESSURE: 126 MMHG | BODY MASS INDEX: 29.19 KG/M2 | HEIGHT: 67 IN

## 2023-11-14 DIAGNOSIS — Q62.11 HYDRONEPHROSIS WITH URETEROPELVIC JUNCTION (UPJ) OBSTRUCTION: Chronic | ICD-10-CM

## 2023-11-14 DIAGNOSIS — N18.31 STAGE 3A CHRONIC KIDNEY DISEASE (HCC): Primary | ICD-10-CM

## 2023-11-14 DIAGNOSIS — Q62.39 UPJ OBSTRUCTION, CONGENITAL: ICD-10-CM

## 2023-11-14 PROCEDURE — 99214 OFFICE O/P EST MOD 30 MIN: CPT | Performed by: INTERNAL MEDICINE

## 2023-11-14 RX ORDER — RIBOFLAVIN (VITAMIN B2) 100 MG
100 TABLET ORAL DAILY
COMMUNITY

## 2023-11-14 RX ORDER — TAMSULOSIN HYDROCHLORIDE 0.4 MG/1
0.4 CAPSULE ORAL
COMMUNITY
Start: 2023-11-02 | End: 2024-11-01

## 2023-11-14 NOTE — PROGRESS NOTES
OFFICE FOLLOW UP - Nephrology   Caprice Birch 30 y.o. male MRN: [id number]       ASSESSMENT and PLAN:  Carly Marinelli was seen today for follow-up and chronic kidney disease. Diagnoses and all orders for this visit:    Stage 3a chronic kidney disease (HCC)  -     Albumin / creatinine urine ratio; Future  -     Basic metabolic panel; Future    UPJ obstruction, congenital    Hydronephrosis with ureteropelvic junction (UPJ) obstruction        This is a 30year-old gentleman who returns to the office for year CKD stage II/IIIA follow-up. 1.  Chronic kidney disease stage 2/3A, previous creatinine around 1.3-1.6 since 2009 in the setting of chronic severe right hydronephrosis and solitary functional kidney. Blood test were reviewed with patient and with his mother, kidney function fairly stable with a most recent creatinine of 1.14 and estimated GFR of 82, no microalbuminuria  Once again we discussed about importance to stay well-hydrated, follow a healthy lifestyle, keep a good weight and avoid NSAIDs. To see him back in the office in 1 year with repeat labs    2. Chronic severe right hydronephrosis, with essentially nonfunctional right kidney based on Mag 3 scan. Follows with urology    3. Hemoglobin normal. Last hemoglobin 14.7 5/2023    4. Mineral bone disease, previous calcium, phosphorus and PTH within normal limits. 5.  Hemodynamics, blood pressure currently controlled after recheck. Continue with low-salt diet        Patient Instructions   As we discussed in the office visit after reviewing most recent blood and urine test your kidney function remains fairly stable. For my end, I would like to see you back in the office in 1 year with repeat blood and urine test.  Remember to stay physically active. Remember to follow a healthy diet. Remember to avoid NSAIDs (no ibuprofen, Motrin, Advil, Aleve, naproxen)  Okay to take Tylenol or acetaminophen as needed for pain.   Continue close follow-up with your primary care doctor and your urologist.        HPI: Teja De Paz is a 30 y.o. male who is here for Follow-up and Chronic Kidney Disease  . The last time seen was in 11/2022, today returns for year follow-up. Previous Mag 3 scan 08/2022 showed an essentially nonfunctional right kidney and unremarkable left kidney    Patient today presents to the office alone but his mother participated of the encounter via the phone    In general doing well, denies significant complaints other than post voiding dribbling. Patient denies any chest pain, shortness of breath or leg swelling. Denies any abdominal pain, no recent nausea, no vomiting, no diarrhea or constipation  Denies urinary problems,, no burning sensation, no gross hematuria. Appetite is OK, weight increased 2 lbs over last 12 months. Only takes Tylenol for pain. Denies tobacco abuse    Recent blood test were done on 11/10/2023, results were reviewed with patient, most recent creatinine 1.14 with an estimated GFR of 82, no proteinuria      ROS: All the systems were reviewed and were negative except as documented on the H&P.       Allergies: Sulfa antibiotics    Medications:   Current Outpatient Medications:     acetaminophen (TYLENOL) 325 mg tablet, Take 650 mg by mouth every 6 (six) hours as needed for mild pain, Disp: , Rfl:     albuterol (PROVENTIL HFA,VENTOLIN HFA) 90 mcg/act inhaler, Inhale 2 puffs every 6 (six) hours as needed for wheezing, Disp: , Rfl:     Ascorbic Acid (vitamin C) 100 MG tablet, Take 100 mg by mouth daily, Disp: , Rfl:     Cholecalciferol (VITAMIN D PO), Take 5,000 Units by mouth daily , Disp: , Rfl:     tamsulosin (FLOMAX) 0.4 mg, Take 0.4 mg by mouth daily with dinner, Disp: , Rfl:     vitamin B-12 (VITAMIN B-12) 500 mcg tablet, Take 500 mcg by mouth daily, Disp: , Rfl:     cholecalciferol (VITAMIN D3) 1,000 units tablet, Take 2,000 Units by mouth daily (Patient not taking: Reported on 11/14/2023), Disp: , Rfl: cyclobenzaprine (FLEXERIL) 5 mg tablet, Take 1 tablet (5 mg total) by mouth 3 (three) times a day as needed for muscle spasms for up to 9 doses (Patient not taking: Reported on 10/24/2023), Disp: 9 tablet, Rfl: 0    fluticasone (FLONASE) 50 mcg/act nasal spray, 2 sprays into each nostril daily  (Patient not taking: Reported on 11/19/2021), Disp: , Rfl:     predniSONE 10 mg tablet, if needed (Patient not taking: Reported on 11/17/2022), Disp: , Rfl:     Past Medical History:   Diagnosis Date    Renal failure      Past Surgical History:   Procedure Laterality Date    TONSILLECTOMY AND ADENOIDECTOMY       Family History   Family history unknown: Yes      reports that he has never smoked. He has never used smokeless tobacco. He reports that he does not drink alcohol and does not use drugs. Physical Exam:   Vitals:    11/14/23 0822 11/14/23 0844   BP: 132/100 126/81   BP Location: Left arm Left arm   Patient Position: Sitting Sitting   Cuff Size: Large Standard   Weight: 84.4 kg (186 lb)    Height: 5' 7" (1.702 m)      Body mass index is 29.13 kg/m².     General: conscious, cooperative, in not acute distress  Eyes: conjunctivae pink, anicteric sclerae  ENT: lips and mucous membranes moist  Neck: supple, no JVD  Chest: clear breath sounds bilateral, no crackles, ronchus or wheezings  CVS: distinct S1 & S2, normal rate, regular rhythm  Abdomen: soft, non-tender, non-distended, normoactive bowel sounds  Back: no CVA tenderness  Extremities: no edema of both legs  Skin: no rash  Neuro: awake, alert, oriented        Lab Results:   Results from last 7 days   Lab Units 11/10/23  1137   POTASSIUM mmol/L 4.3   CHLORIDE mmol/L 103   CO2 mmol/L 25   BUN mg/dL 15   CREATININE mg/dL 1.14   CALCIUM mg/dL 9.6       Laboratory Results:  Lab Results   Component Value Date    WBC 5.71 05/22/2023    HGB 14.7 05/22/2023    HCT 44.5 05/22/2023    MCV 91 05/22/2023     05/22/2023     Lab Results   Component Value Date    SODIUM 138 11/10/2023    K 4.3 11/10/2023     11/10/2023    CO2 25 11/10/2023    BUN 15 11/10/2023    CREATININE 1.14 11/10/2023    GLUC 88 05/22/2023    CALCIUM 9.6 11/10/2023       Lab Results   Component Value Date    PTH 36.5 11/11/2022    CALCIUM 9.6 11/10/2023    PHOS 3.1 11/11/2022             Portions of the record may have been created with voice recognition software. Occasional wrong word or "sound a like" substitutions may have occurred due to the inherent limitations of voice recognition software. Read the chart carefully and recognize, using context, where substitutions have occurred. If you have any questions, please contact the dictating provider.

## 2023-11-14 NOTE — PATIENT INSTRUCTIONS
As we discussed in the office visit after reviewing most recent blood and urine test your kidney function remains fairly stable. For my end, I would like to see you back in the office in 1 year with repeat blood and urine test.  Remember to stay physically active. Remember to follow a healthy diet. Remember to avoid NSAIDs (no ibuprofen, Motrin, Advil, Aleve, naproxen)  Okay to take Tylenol or acetaminophen as needed for pain.   Continue close follow-up with your primary care doctor and your urologist.

## 2023-11-14 NOTE — LETTER
November 14, 2023     [place] [id number]    Patient: Keke Patel   YOB: 1985   Date of Visit: 11/14/2023       Dear Dr. Corinne Castellanos: Thank you for referring Bassam Rob to me for evaluation. Below are my notes for this consultation. If you have questions, please do not hesitate to call me. I look forward to following your patient along with you. Sincerely,        Smooth Warren MD        CC: No Recipients    Smooth Warren MD  11/14/2023  8:57 AM  Incomplete  OFFICE FOLLOW UP - Nephrology   Keke Patel 30 y.o. male MRN: [id number]       ASSESSMENT and PLAN:  Michael Fall was seen today for follow-up and chronic kidney disease. Diagnoses and all orders for this visit:    Stage 3a chronic kidney disease (HCC)  -     Albumin / creatinine urine ratio; Future  -     Basic metabolic panel; Future    UPJ obstruction, congenital    Hydronephrosis with ureteropelvic junction (UPJ) obstruction        This is a 30year-old gentleman who returns to the office for year CKD stage II/IIIA follow-up. 1.  Chronic kidney disease stage 2/3A, previous creatinine around 1.3-1.6 since 2009 in the setting of chronic severe right hydronephrosis and solitary functional kidney. Blood test were reviewed with patient and with his mother, kidney function fairly stable with a most recent creatinine of 1.14 and estimated GFR of 82, no microalbuminuria  Once again we discussed about importance to stay well-hydrated, follow a healthy lifestyle, keep a good weight and avoid NSAIDs. To see him back in the office in 1 year with repeat labs    2. Chronic severe right hydronephrosis, with essentially nonfunctional right kidney based on Mag 3 scan. Follows with urology    3. Hemoglobin normal. Last hemoglobin 14.7 5/2023    4. Mineral bone disease, previous calcium, phosphorus and PTH within normal limits.       5.  Hemodynamics, blood pressure currently controlled after recheck. Continue with low-salt diet        Patient Instructions   As we discussed in the office visit after reviewing most recent blood and urine test your kidney function remains fairly stable. For my end, I would like to see you back in the office in 1 year with repeat blood and urine test.  Remember to stay physically active. Remember to follow a healthy diet. Remember to avoid NSAIDs (no ibuprofen, Motrin, Advil, Aleve, naproxen)  Okay to take Tylenol or acetaminophen as needed for pain. Continue close follow-up with your primary care doctor and your urologist.        HPI: Tony Kimble is a 30 y.o. male who is here for Follow-up and Chronic Kidney Disease  . The last time seen was in 11/2022, today returns for year follow-up. Previous Mag 3 scan 08/2022 showed an essentially nonfunctional right kidney and unremarkable left kidney    Patient today presents to the office alone but his mother participated of the encounter via the phone    In general doing well, denies significant complaints other than post voiding dribbling. Patient denies any chest pain, shortness of breath or leg swelling. Denies any abdominal pain, no recent nausea, no vomiting, no diarrhea or constipation  Denies urinary problems,, no burning sensation, no gross hematuria. Appetite is OK, weight increased 2 lbs over last 12 months. Only takes Tylenol for pain. Denies tobacco abuse    Recent blood test were done on 11/10/2023, results were reviewed with patient, most recent creatinine 1.14 with an estimated GFR of 82, no proteinuria      ROS: All the systems were reviewed and were negative except as documented on the H&P.       Allergies: Sulfa antibiotics    Medications:   Current Outpatient Medications:   •  acetaminophen (TYLENOL) 325 mg tablet, Take 650 mg by mouth every 6 (six) hours as needed for mild pain, Disp: , Rfl:   •  albuterol (PROVENTIL HFA,VENTOLIN HFA) 90 mcg/act inhaler, Inhale 2 puffs every 6 (six) hours as needed for wheezing, Disp: , Rfl:   •  Ascorbic Acid (vitamin C) 100 MG tablet, Take 100 mg by mouth daily, Disp: , Rfl:   •  Cholecalciferol (VITAMIN D PO), Take 5,000 Units by mouth daily , Disp: , Rfl:   •  tamsulosin (FLOMAX) 0.4 mg, Take 0.4 mg by mouth daily with dinner, Disp: , Rfl:   •  vitamin B-12 (VITAMIN B-12) 500 mcg tablet, Take 500 mcg by mouth daily, Disp: , Rfl:   •  cholecalciferol (VITAMIN D3) 1,000 units tablet, Take 2,000 Units by mouth daily (Patient not taking: Reported on 11/14/2023), Disp: , Rfl:   •  cyclobenzaprine (FLEXERIL) 5 mg tablet, Take 1 tablet (5 mg total) by mouth 3 (three) times a day as needed for muscle spasms for up to 9 doses (Patient not taking: Reported on 10/24/2023), Disp: 9 tablet, Rfl: 0  •  fluticasone (FLONASE) 50 mcg/act nasal spray, 2 sprays into each nostril daily  (Patient not taking: Reported on 11/19/2021), Disp: , Rfl:   •  predniSONE 10 mg tablet, if needed (Patient not taking: Reported on 11/17/2022), Disp: , Rfl:     Past Medical History:   Diagnosis Date   • Renal failure      Past Surgical History:   Procedure Laterality Date   • TONSILLECTOMY AND ADENOIDECTOMY       Family History   Family history unknown: Yes      reports that he has never smoked. He has never used smokeless tobacco. He reports that he does not drink alcohol and does not use drugs. Physical Exam:   Vitals:    11/14/23 0822 11/14/23 0844   BP: 132/100 126/81   BP Location: Left arm Left arm   Patient Position: Sitting Sitting   Cuff Size: Large Standard   Weight: 84.4 kg (186 lb)    Height: 5' 7" (1.702 m)      Body mass index is 29.13 kg/m².     General: conscious, cooperative, in not acute distress  Eyes: conjunctivae pink, anicteric sclerae  ENT: lips and mucous membranes moist  Neck: supple, no JVD  Chest: clear breath sounds bilateral, no crackles, ronchus or wheezings  CVS: distinct S1 & S2, normal rate, regular rhythm  Abdomen: soft, non-tender, non-distended, normoactive bowel sounds  Back: no CVA tenderness  Extremities: no edema of both legs  Skin: no rash  Neuro: awake, alert, oriented        Lab Results:   Results from last 7 days   Lab Units 11/10/23  1137   POTASSIUM mmol/L 4.3   CHLORIDE mmol/L 103   CO2 mmol/L 25   BUN mg/dL 15   CREATININE mg/dL 1.14   CALCIUM mg/dL 9.6       Laboratory Results:  Lab Results   Component Value Date    WBC 5.71 05/22/2023    HGB 14.7 05/22/2023    HCT 44.5 05/22/2023    MCV 91 05/22/2023     05/22/2023     Lab Results   Component Value Date    SODIUM 138 11/10/2023    K 4.3 11/10/2023     11/10/2023    CO2 25 11/10/2023    BUN 15 11/10/2023    CREATININE 1.14 11/10/2023    GLUC 88 05/22/2023    CALCIUM 9.6 11/10/2023       Lab Results   Component Value Date    PTH 36.5 11/11/2022    CALCIUM 9.6 11/10/2023    PHOS 3.1 11/11/2022             Portions of the record may have been created with voice recognition software. Occasional wrong word or "sound a like" substitutions may have occurred due to the inherent limitations of voice recognition software. Read the chart carefully and recognize, using context, where substitutions have occurred. If you have any questions, please contact the dictating provider. Jeralene Cabot, MD  11/14/2023  8:50 AM  Sign when Signing Visit  OFFICE FOLLOW UP - Nephrology   Carlos Lighter 30 y.o. male MRN: [id number]       ASSESSMENT and PLAN:  Coral Sullivan was seen today for follow-up and chronic kidney disease. Diagnoses and all orders for this visit:    Stage 3a chronic kidney disease (HCC)  -     Albumin / creatinine urine ratio; Future  -     Basic metabolic panel; Future    UPJ obstruction, congenital    Hydronephrosis with ureteropelvic junction (UPJ) obstruction        This is a 31year-old gentleman who returns to the office for year CKD stage II/3 follow-up.     1.  Chronic kidney disease stage 2/3A, previous creatinine around 1.3-1.6 since 2009 in the setting of chronic severe right hydronephrosis and solitary functional kidney. Kidney function fairly stable with a most recent creatinine of 1.17, no proteinuria. Once again discussed with patient about importance to stay well-hydrated, follow low-salt diet, stay physically active and keep a good weight. Avoid NSAIDs. I would like to follow repeat blood and urine test in 6 months, if renal function remains stable I would like to see him back in the office in 1 year. 2.  Chronic severe right hydronephrosis, with essentially nonfunctional right kidney based on most recent Mag 3 scan. Recommend to continue follow-up with Urology    3. Hemoglobin normal. Last hemoglobin 14.7    4. Mineral bone disease, most recent phosphorus and PTH within normal limits. Follow labs in 1 year    5. Hemodynamics, blood pressure currently acceptable after recheck. Continue with low-salt diet        Patient Instructions   As we discussed in the office visit after reviewing most recent blood and urine test your kidney function remains fairly stable. For my end, I would like to see you back in the office in 1 year with repeat blood and urine test.  Remember to stay physically active. Remember to follow a healthy diet. Remember to avoid NSAIDs (no ibuprofen, Motrin, Advil, Aleve, naproxen)  Okay to take Tylenol or acetaminophen as needed for pain. Continue close follow-up with your primary care doctor and your urologist.        HPI: Zenobia Carmen is a 30 y.o. male who is here for Follow-up and Chronic Kidney Disease  . The last time seen was in 05/2022, today returns for six month follow-up. Patient in general is doing well, no significant complaints. Previous Mag 3 scan and shows an essentially nonfunctional right kidney    Patient denies any chest pain, shortness of breath or leg swelling.    Denies any abdominal pain, no recent nausea, no vomiting, no diarrhea or constipation  Denies urinary problems,, no burning sensation, no gross hematuria. Appetite is OK, weight increased 2 lbs over last 12 months. Only takes Tylenol for pain. Denies tobacco abuse    Recent blood test were done on 11/10/2023, results were reviewed with patient, most recent creatinine 1.14 with an estimated GFR of 82, no proteinuria      ROS: All the systems were reviewed and were negative except as documented on the H&P. Allergies: Sulfa antibiotics    Medications:   Current Outpatient Medications:   •  acetaminophen (TYLENOL) 325 mg tablet, Take 650 mg by mouth every 6 (six) hours as needed for mild pain, Disp: , Rfl:   •  albuterol (PROVENTIL HFA,VENTOLIN HFA) 90 mcg/act inhaler, Inhale 2 puffs every 6 (six) hours as needed for wheezing, Disp: , Rfl:   •  Ascorbic Acid (vitamin C) 100 MG tablet, Take 100 mg by mouth daily, Disp: , Rfl:   •  Cholecalciferol (VITAMIN D PO), Take 5,000 Units by mouth daily , Disp: , Rfl:   •  tamsulosin (FLOMAX) 0.4 mg, Take 0.4 mg by mouth daily with dinner, Disp: , Rfl:   •  vitamin B-12 (VITAMIN B-12) 500 mcg tablet, Take 500 mcg by mouth daily, Disp: , Rfl:   •  cholecalciferol (VITAMIN D3) 1,000 units tablet, Take 2,000 Units by mouth daily (Patient not taking: Reported on 11/14/2023), Disp: , Rfl:   •  cyclobenzaprine (FLEXERIL) 5 mg tablet, Take 1 tablet (5 mg total) by mouth 3 (three) times a day as needed for muscle spasms for up to 9 doses (Patient not taking: Reported on 10/24/2023), Disp: 9 tablet, Rfl: 0  •  fluticasone (FLONASE) 50 mcg/act nasal spray, 2 sprays into each nostril daily  (Patient not taking: Reported on 11/19/2021), Disp: , Rfl:   •  predniSONE 10 mg tablet, if needed (Patient not taking: Reported on 11/17/2022), Disp: , Rfl:     Past Medical History:   Diagnosis Date   • Renal failure      Past Surgical History:   Procedure Laterality Date   • TONSILLECTOMY AND ADENOIDECTOMY       Family History   Family history unknown: Yes      reports that he has never smoked.  He has never used smokeless tobacco. He reports that he does not drink alcohol and does not use drugs. Physical Exam:   Vitals:    11/14/23 0822 11/14/23 0844   BP: 132/100 126/81   BP Location: Left arm Left arm   Patient Position: Sitting Sitting   Cuff Size: Large Standard   Weight: 84.4 kg (186 lb)    Height: 5' 7" (1.702 m)      Body mass index is 29.13 kg/m². General: conscious, cooperative, in not acute distress  Eyes: conjunctivae pink, anicteric sclerae  ENT: lips and mucous membranes moist  Neck: supple, no JVD  Chest: clear breath sounds bilateral, no crackles, ronchus or wheezings  CVS: distinct S1 & S2, normal rate, regular rhythm  Abdomen: soft, non-tender, non-distended, normoactive bowel sounds  Back: no CVA tenderness  Extremities: no edema of both legs  Skin: no rash  Neuro: awake, alert, oriented        Lab Results:   Results from last 7 days   Lab Units 11/10/23  1137   POTASSIUM mmol/L 4.3   CHLORIDE mmol/L 103   CO2 mmol/L 25   BUN mg/dL 15   CREATININE mg/dL 1.14   CALCIUM mg/dL 9.6       Laboratory Results:  Lab Results   Component Value Date    WBC 5.71 05/22/2023    HGB 14.7 05/22/2023    HCT 44.5 05/22/2023    MCV 91 05/22/2023     05/22/2023     Lab Results   Component Value Date    SODIUM 138 11/10/2023    K 4.3 11/10/2023     11/10/2023    CO2 25 11/10/2023    BUN 15 11/10/2023    CREATININE 1.14 11/10/2023    GLUC 88 05/22/2023    CALCIUM 9.6 11/10/2023       Lab Results   Component Value Date    PTH 36.5 11/11/2022    CALCIUM 9.6 11/10/2023    PHOS 3.1 11/11/2022             Portions of the record may have been created with voice recognition software. Occasional wrong word or "sound a like" substitutions may have occurred due to the inherent limitations of voice recognition software. Read the chart carefully and recognize, using context, where substitutions have occurred. If you have any questions, please contact the dictating provider.

## 2023-12-19 ENCOUNTER — TELEPHONE (OUTPATIENT)
Age: 38
End: 2023-12-19

## 2023-12-19 NOTE — TELEPHONE ENCOUNTER
Spoke to patient and informed him per provider review of his records from [facility] that he should move forward with in office cystoscopy. Patient verbalized understanding.

## 2023-12-19 NOTE — TELEPHONE ENCOUNTER
----- Message from Vinay Breen sent at 12/19/2023  8:13 AM EST -----  Regarding: Your Opinion is needed  Contact: [phone]  Dr. Philip Mack    On my 10/24/2023 appt. I didn't get a summary of my visit. On that date I did not mention my problem that I've been having of completely emptying out urine.  I tried to schedule another appointment with you, but your office scheduled me for 5/6/24 and that they would call me if they had cancellations. Since I never heard back from the office.  I went to Dr. Nicky Craig, my primary doctor, she saw me on 11/1/23 and ordered tests.  Results were back 11/2 and [facility] scheduled a surgical visit on 1/12/24.  Since you are my Urologist, I want to know your opinion on this if this test is absolutely necessary or it can wait until I see you on 5/6/24.   Thank you.    Happy Holidays to you and your family!  Vinay Breen

## 2023-12-19 NOTE — TELEPHONE ENCOUNTER
Patient would like to know your opinion regarding recommended cystoscopy per [facility] urology. Please see notes for [facility] on 11/2/2023, and reach patient back with what your thoughts are on the recommendation.

## 2024-03-12 ENCOUNTER — CLINICAL SUPPORT (OUTPATIENT)
Dept: NUTRITION | Facility: HOSPITAL | Age: 39
End: 2024-03-12
Payer: COMMERCIAL

## 2024-03-12 VITALS — WEIGHT: 190.26 LBS | HEIGHT: 67 IN | BODY MASS INDEX: 29.86 KG/M2

## 2024-03-12 DIAGNOSIS — N18.31 CHRONIC KIDNEY DISEASE (CKD) STAGE G3A/A1, MODERATELY DECREASED GLOMERULAR FILTRATION RATE (GFR) BETWEEN 45-59 ML/MIN/1.73 SQUARE METER AND ALBUMINURIA CREATININE RATIO LESS THAN 30 MG/G (HCC): Primary | ICD-10-CM

## 2024-03-12 PROCEDURE — 97803 MED NUTRITION INDIV SUBSEQ: CPT

## 2024-03-12 NOTE — PROGRESS NOTES
"ollow-Up Nutrition Assessment Form    Patient Name: Vinay Beren    YOB: 1985    Sex: Male      Follow Up Date: 3/12/2024  Start Time: 10:30am Stop Time: 11am Total Minutes: 30 mins     Data:  Present at session: self   Parent/Patient Concerns: Has been having dizziness and lack of stamina at times and feels has been eating more to see if it helps improve they symptom but concerned with weight gain   Medical Dx/Reason for Referral: N18.31 CKD3   Past Medical History:   Diagnosis Date    Renal failure        Current Outpatient Medications   Medication Sig Dispense Refill    acetaminophen (TYLENOL) 325 mg tablet Take 650 mg by mouth every 6 (six) hours as needed for mild pain      albuterol (PROVENTIL HFA,VENTOLIN HFA) 90 mcg/act inhaler Inhale 2 puffs every 6 (six) hours as needed for wheezing      Ascorbic Acid (vitamin C) 100 MG tablet Take 100 mg by mouth daily      Cholecalciferol (VITAMIN D PO) Take 5,000 Units by mouth daily       cholecalciferol (VITAMIN D3) 1,000 units tablet Take 2,000 Units by mouth daily (Patient not taking: Reported on 11/14/2023)      cyclobenzaprine (FLEXERIL) 5 mg tablet Take 1 tablet (5 mg total) by mouth 3 (three) times a day as needed for muscle spasms for up to 9 doses (Patient not taking: Reported on 10/24/2023) 9 tablet 0    fluticasone (FLONASE) 50 mcg/act nasal spray 2 sprays into each nostril daily  (Patient not taking: Reported on 11/19/2021)      predniSONE 10 mg tablet if needed (Patient not taking: Reported on 11/17/2022)      tamsulosin (FLOMAX) 0.4 mg Take 0.4 mg by mouth daily with dinner      vitamin B-12 (VITAMIN B-12) 500 mcg tablet Take 500 mcg by mouth daily       No current facility-administered medications for this visit.        Additional Meds/Supplements:    Barriers to Learning:    Labs:    Height: Ht Readings from Last 3 Encounters:   03/12/24 5' 7\" (1.702 m)   11/14/23 5' 7\" (1.702 m)   11/07/23 5' 7\" (1.702 m)      Weight: Wt Readings from " "Last 10 Encounters:   03/12/24 86.3 kg (190 lb 4.1 oz)   11/14/23 84.4 kg (186 lb)   11/07/23 83.3 kg (183 lb 11.2 oz)   10/24/23 83.9 kg (185 lb)   08/02/23 80.3 kg (177 lb)   05/22/23 84 kg (185 lb 3 oz)   04/12/23 81.8 kg (180 lb 6.4 oz)   03/19/23 85.1 kg (187 lb 9.8 oz)   01/16/23 82.6 kg (182 lb 1.6 oz)   11/17/22 83.5 kg (184 lb)     Estimated body mass index is 29.8 kg/m² as calculated from the following:    Height as of this encounter: 5' 7\" (1.702 m).    Weight as of this encounter: 86.3 kg (190 lb 4.1 oz).   Wt. Change Since Last Visit: [x]Yes     []No  Amount: 7lbs/3.8% wt. Gain x 4 months      Energy Needs:    Pain Screen: Are you having pain now?       Previous Goals or Goals Achieved:     1. Avoid skipping breakfast, consume 3 balanced meals at least 2 of the meals using the plate method by f/u- 4/12 meeting, has been consuming eggs, wheat bread, or cereal with 1% milk, continue, 8/2 1 egg, with wheat toast, 1%milk, coffee or cereal captain crunch, cheerios, gold apolinar with 1% milk, continue, 11/7 met/not met, continues to consume breakfast, 1 egg/sometimes bread, coffee, water, has had challenges with following My plate for meals, continue by f/u, 3/12 met, continue by f/u   2. Replace high salty snacks with fresh fruit or vegetables or lower sodium snacks by f/u-4/12 improving has been consuming low sodium crackers, has been cutting back on doritos, pringles, fritos and looking at labels more, continue, 8/2 met continues to use low sodium, or no salt add crackers, but not consuming much of fruits/vegetables as snack, still eats corn chips, doritos but has decreased amount, continue f/u, 11/7 not met, continue by f/u, 3/12- met continue by f/u but replaced with welches fruit snack, provided other healthier snack ideas continue by f/u    3. Aim for 2-4 lb weight loss x 1 month by f/u- 4/12 met, lost 8lbs/ from last month, continue to lose weight to a normal BMI range by f/u, 8/2 not met, weight " increased in May but then back down overall lost 3lbs in 4 month, continue by f/u, 11/7 not met, continue by f/u      4. Participate in physical activity at least 30-60 mins 3x week by f/u- 8/2 not met has been walking 1 lap around a park and then plays basketball for about 30-45 mins 1 x week, continue by f/u, 11/7 met, continue by f/u, 3/12 not met, but improving, has gone to [facility], walks on the indoor track but hasn't been consistently going, continue by f/u         New Goals:   1. Limit to 2000mg of Na a day, limit processed meats, frozen pizza, canned beans and sauces, read food labels to monitor by f/u, 11/7 not met, continue by f/u, 3/12 not met, has been eating frozen meals, fast food, cooks with salt, discussed tips for flavoring food versus salt, continue by f/u     New Goals:   1.   2.   3.       Initial PES: Food and nutrition related knowledge deficit  related to Lack of prior exposure to accurate nutrition related information as evidenced by Conditions associated with diagnosis or treatment -continue        New PES:       New Problem List:  1.   2.   3.       Assessment:  Vinay presents for nutrition f/u. Diet hx: B: eggs, cereal with 1% milk, coffee or eggs, 1 bread, coffee, snack: welches fruit snack L: 1 slice pizza, fruit, veggies or frozen meal with troy steak, mashed potato, [employer], snacks: fruit snack D: variety of meats, rice, beans, potato, HS: fruit snacks, or any fresh fruit. Discussed tips for cutting back on on sodium from frozen meals and fast food, encouraged reading food labels, and provided tips for flavoring food versus salt. Provided ideas to increase fruits and veggies and provided healthier snack ideas.      Medical Nutrition Therapy Intervention:  [x]Individualized Meal Plan- 2gm Na diet, 3 balanced meals using MyPlate  []Understanding Lab Values   []Basic Pathophysiology of Disease []Food/Medication Interactions   []Food Diary []Exercise  "  []Lifestyle/Behavior Modification Techniques []Medication, Mechanism of Action   []Label Reading []Self Blood Glucose Monitoring   []Weight/BMI Goals []Other -    Other Notes:        Comprehension: []Excellent  []Very Good  [x]Good  []Fair   []Poor    Receptivity: []Excellent  []Very Good  [x]Good  []Fair   []Poor    Expected Compliance: []Excellent  []Very Good  [x]Good  []Fair   []Poor      Labs:  CMP  Lab Results   Component Value Date     08/01/2015    K 4.3 11/10/2023     11/10/2023    CO2 25 11/10/2023    ANIONGAP 7 08/01/2015    BUN 15 11/10/2023    CREATININE 1.14 11/10/2023    GLUCOSE 87 08/01/2015    GLUF 92 11/10/2023    CALCIUM 9.6 11/10/2023    AST 20 05/22/2023    ALT 15 05/22/2023    ALKPHOS 53 05/22/2023    EGFR 82 11/10/2023       BMP  Lab Results   Component Value Date    GLUCOSE 87 08/01/2015    CALCIUM 9.6 11/10/2023     08/01/2015    K 4.3 11/10/2023    CO2 25 11/10/2023     11/10/2023    BUN 15 11/10/2023    CREATININE 1.14 11/10/2023       Lipids  Lab Results   Component Value Date    CHOL 197 09/24/2015     Lab Results   Component Value Date    HDL 41 02/24/2019    HDL 37 (L) 07/31/2016    HDL 43 09/24/2015     Lab Results   Component Value Date    LDLCALC 142 (H) 02/24/2019    LDLCALC 123 (H) 07/31/2016    LDLCALC 134 (H) 09/24/2015     Lab Results   Component Value Date    TRIG 115 02/24/2019    TRIG 96 07/31/2016    TRIG 101 09/24/2015     No results found for: \"CHOLHDL\"    Hemoglobin A1C  No results found for: \"HGBA1C\"    Fasting Glucose  Lab Results   Component Value Date    GLUF 92 11/10/2023       Insulin     Thyroid  Lab Results   Component Value Date    TSH 1.75 02/21/2022       Hepatic Function Panel  Lab Results   Component Value Date    ALT 15 05/22/2023    AST 20 05/22/2023    ALKPHOS 53 05/22/2023       Celiac Disease Antibody Panel  No results found for: \"ENDOMYSIAL IGA\", \"GLIADIN IGA\", \"GLIADIN IGG\", \"IGA\", \"TISSUE TRANSGLUT AB\", \"TTG IGA\"   Iron  No " "results found for: \"IRON\", \"TIBC\", \"FERRITIN\"    Vitamins  No results found for: \"VITAMIN B2\"   No results found for: \"NICOTINAMIDE\", \"NICOTINIC ACID\"   No results found for: \"VITAMINB6\"  No results found for: \"BPAADPFY08\"  No results found for: \"VITB5\"  No results found for: \"F9NLWFWK\"  No results found for: \"THYROGLB\"  No results found for: \"VITAMIN K\"   No results found for: \"25-HYDROXY VIT D\"   No components found for: \"VITAMINE\"     No follow-ups on file.    Bj Byrne RD  [facility]  [address] [place] [phone]    "

## 2024-04-24 ENCOUNTER — APPOINTMENT (OUTPATIENT)
Dept: LAB | Facility: CLINIC | Age: 39
End: 2024-04-24
Payer: COMMERCIAL

## 2024-04-24 DIAGNOSIS — Z00.00 ROUTINE GENERAL MEDICAL EXAMINATION AT A HEALTH CARE FACILITY: ICD-10-CM

## 2024-04-24 LAB
ALBUMIN SERPL BCP-MCNC: 4.5 G/DL (ref 3.5–5)
ALP SERPL-CCNC: 52 U/L (ref 34–104)
ALT SERPL W P-5'-P-CCNC: 22 U/L (ref 7–52)
ANION GAP SERPL CALCULATED.3IONS-SCNC: 5 MMOL/L (ref 4–13)
AST SERPL W P-5'-P-CCNC: 24 U/L (ref 13–39)
BASOPHILS # BLD AUTO: 0.02 THOUSANDS/ÂΜL (ref 0–0.1)
BASOPHILS NFR BLD AUTO: 0 % (ref 0–1)
BILIRUB SERPL-MCNC: 0.5 MG/DL (ref 0.2–1)
BUN SERPL-MCNC: 18 MG/DL (ref 5–25)
CALCIUM SERPL-MCNC: 9.4 MG/DL (ref 8.4–10.2)
CHLORIDE SERPL-SCNC: 104 MMOL/L (ref 96–108)
CHOLEST SERPL-MCNC: 216 MG/DL
CO2 SERPL-SCNC: 28 MMOL/L (ref 21–32)
CREAT SERPL-MCNC: 1.25 MG/DL (ref 0.6–1.3)
EOSINOPHIL # BLD AUTO: 0.17 THOUSAND/ÂΜL (ref 0–0.61)
EOSINOPHIL NFR BLD AUTO: 3 % (ref 0–6)
ERYTHROCYTE [DISTWIDTH] IN BLOOD BY AUTOMATED COUNT: 12.9 % (ref 11.6–15.1)
GFR SERPL CREATININE-BSD FRML MDRD: 73 ML/MIN/1.73SQ M
GLUCOSE P FAST SERPL-MCNC: 77 MG/DL (ref 65–99)
HCT VFR BLD AUTO: 46.1 % (ref 36.5–49.3)
HDLC SERPL-MCNC: 41 MG/DL
HGB BLD-MCNC: 15 G/DL (ref 12–17)
IMM GRANULOCYTES # BLD AUTO: 0.03 THOUSAND/UL (ref 0–0.2)
IMM GRANULOCYTES NFR BLD AUTO: 1 % (ref 0–2)
LDLC SERPL CALC-MCNC: 146 MG/DL (ref 0–100)
LYMPHOCYTES # BLD AUTO: 1.32 THOUSANDS/ÂΜL (ref 0.6–4.47)
LYMPHOCYTES NFR BLD AUTO: 25 % (ref 14–44)
MCH RBC QN AUTO: 29.8 PG (ref 26.8–34.3)
MCHC RBC AUTO-ENTMCNC: 32.5 G/DL (ref 31.4–37.4)
MCV RBC AUTO: 92 FL (ref 82–98)
MONOCYTES # BLD AUTO: 0.62 THOUSAND/ÂΜL (ref 0.17–1.22)
MONOCYTES NFR BLD AUTO: 12 % (ref 4–12)
NEUTROPHILS # BLD AUTO: 3.13 THOUSANDS/ÂΜL (ref 1.85–7.62)
NEUTS SEG NFR BLD AUTO: 59 % (ref 43–75)
NONHDLC SERPL-MCNC: 175 MG/DL
NRBC BLD AUTO-RTO: 0 /100 WBCS
PLATELET # BLD AUTO: 299 THOUSANDS/UL (ref 149–390)
PMV BLD AUTO: 10.3 FL (ref 8.9–12.7)
POTASSIUM SERPL-SCNC: 4.1 MMOL/L (ref 3.5–5.3)
PROT SERPL-MCNC: 7.3 G/DL (ref 6.4–8.4)
RBC # BLD AUTO: 5.03 MILLION/UL (ref 3.88–5.62)
SODIUM SERPL-SCNC: 137 MMOL/L (ref 135–147)
TRIGL SERPL-MCNC: 143 MG/DL
WBC # BLD AUTO: 5.29 THOUSAND/UL (ref 4.31–10.16)

## 2024-04-24 PROCEDURE — 80053 COMPREHEN METABOLIC PANEL: CPT

## 2024-04-24 PROCEDURE — 85025 COMPLETE CBC W/AUTO DIFF WBC: CPT

## 2024-04-24 PROCEDURE — 36415 COLL VENOUS BLD VENIPUNCTURE: CPT

## 2024-04-24 PROCEDURE — 80061 LIPID PANEL: CPT

## 2024-05-06 ENCOUNTER — TELEPHONE (OUTPATIENT)
Dept: UROLOGY | Facility: AMBULATORY SURGERY CENTER | Age: 39
End: 2024-05-06

## 2024-05-06 ENCOUNTER — OFFICE VISIT (OUTPATIENT)
Dept: UROLOGY | Facility: AMBULATORY SURGERY CENTER | Age: 39
End: 2024-05-06
Payer: COMMERCIAL

## 2024-05-06 VITALS
WEIGHT: 190 LBS | BODY MASS INDEX: 29.82 KG/M2 | SYSTOLIC BLOOD PRESSURE: 130 MMHG | HEIGHT: 67 IN | OXYGEN SATURATION: 98 % | DIASTOLIC BLOOD PRESSURE: 80 MMHG | HEART RATE: 88 BPM

## 2024-05-06 DIAGNOSIS — R32 URINARY INCONTINENCE, UNSPECIFIED TYPE: Primary | ICD-10-CM

## 2024-05-06 DIAGNOSIS — Q62.11 HYDRONEPHROSIS WITH URETEROPELVIC JUNCTION (UPJ) OBSTRUCTION: Chronic | ICD-10-CM

## 2024-05-06 DIAGNOSIS — N39.43 POST-VOID DRIBBLING: ICD-10-CM

## 2024-05-06 LAB
POST-VOID RESIDUAL VOLUME, ML POC: 29 ML
SL AMB  POCT GLUCOSE, UA: NORMAL
SL AMB LEUKOCYTE ESTERASE,UA: NORMAL
SL AMB POCT BILIRUBIN,UA: NORMAL
SL AMB POCT BLOOD,UA: NORMAL
SL AMB POCT CLARITY,UA: CLEAR
SL AMB POCT COLOR,UA: YELLOW
SL AMB POCT KETONES,UA: NORMAL
SL AMB POCT NITRITE,UA: NORMAL
SL AMB POCT PH,UA: 5
SL AMB POCT SPECIFIC GRAVITY,UA: 1.01
SL AMB POCT URINE PROTEIN: NORMAL
SL AMB POCT UROBILINOGEN: 0.2

## 2024-05-06 PROCEDURE — 51798 US URINE CAPACITY MEASURE: CPT | Performed by: UROLOGY

## 2024-05-06 PROCEDURE — 81002 URINALYSIS NONAUTO W/O SCOPE: CPT | Performed by: UROLOGY

## 2024-05-06 PROCEDURE — 99213 OFFICE O/P EST LOW 20 MIN: CPT | Performed by: UROLOGY

## 2024-05-06 RX ORDER — ERYTHROMYCIN 5 MG/G
OINTMENT OPHTHALMIC
COMMUNITY
Start: 2024-02-19

## 2024-05-06 RX ORDER — PREDNISOLONE ACETATE 10 MG/ML
SUSPENSION/ DROPS OPHTHALMIC
COMMUNITY
Start: 2024-03-01

## 2024-05-06 NOTE — TELEPHONE ENCOUNTER
PER  NOTE:    Return for cysto FT next avail .     PLEASE ASSIST IN FINDING SOMETHING SOONER THAN OCT.  PER

## 2024-05-06 NOTE — TELEPHONE ENCOUNTER
Patient is able to see Dr. Palacios for the cystoscopy on May 21 at 9:30 AM at the [facility]

## 2024-05-06 NOTE — PROGRESS NOTES
5/6/2024    Vinay Breen  1985  [id number]    1. Urinary incontinence, unspecified type  -     POCT Measure PVR  -     POCT urine dip    2. Hydronephrosis with ureteropelvic junction (UPJ) obstruction    3. Post-void dribbling  Assessment & Plan:  Impression: Postvoid dribbling, history of right UPJ obstruction with atrophic right kidney    Plan: Based on the patient's degree of lower urinary tract symptoms and only 38 years of age, I recommend office cystoscopy to assess his urethra for possible stricture or narrowing.  With regards to his right UPJ obstruction recent MAG3 renal scan with Lasix shows 99% split function on the left.  The right kidney is essentially atrophic and as long as he remains asymptomatic without infections no surgical intervention is warranted at this time.  He will return for cystoscopy as above.           History of Present Illness  38 y.o. male with a history of right UPJ obstruction previously known to Dr. Underwood.  MAG3 renal scan with Lasix shows 99% function on the left and 1% function on the right.  His main complaint now is postvoid dribbling.  He states that he has a variable urinary flow.  He has nocturia 3-4 episodes per night.  His mother is present in the office today for discussion.      AUA Symptom Score      Review of Systems    Past Medical History  Past Medical History:   Diagnosis Date   • Renal failure        Past Social History  Past Surgical History:   Procedure Laterality Date   • TONSILLECTOMY AND ADENOIDECTOMY         Past Family History  Family History   Family history unknown: Yes       Past Social history  Social History     Socioeconomic History   • Marital status: Single     Spouse name: Not on file   • Number of children: Not on file   • Years of education: Not on file   • Highest education level: Not on file   Occupational History   • Not on file   Tobacco Use   • Smoking status: Never   • Smokeless tobacco: Never   Substance and Sexual Activity   •  Alcohol use: No   • Drug use: No   • Sexual activity: Not Currently   Other Topics Concern   • Not on file   Social History Narrative   • Not on file     Social Determinants of Health     Financial Resource Strain: Not on file   Food Insecurity: Not on file   Transportation Needs: Not on file   Physical Activity: Not on file   Stress: Not on file   Social Connections: Not on file   Intimate Partner Violence: Not on file   Housing Stability: Not on file       Current Medications  Current Outpatient Medications   Medication Sig Dispense Refill   • acetaminophen (TYLENOL) 325 mg tablet Take 650 mg by mouth every 6 (six) hours as needed for mild pain     • albuterol (PROVENTIL HFA,VENTOLIN HFA) 90 mcg/act inhaler Inhale 2 puffs every 6 (six) hours as needed for wheezing     • Ascorbic Acid (vitamin C) 100 MG tablet Take 100 mg by mouth daily     • Cholecalciferol (VITAMIN D PO) Take 5,000 Units by mouth daily      • erythromycin (ILOTYCIN) ophthalmic ointment Apply 1/2 inch ribbon to both lower eyelids three times a day for 7 days. Use after cleansing and warm compress.     • prednisoLONE acetate (PRED FORTE) 1 % ophthalmic suspension PLEASE SEE ATTACHED FOR DETAILED DIRECTIONS     • vitamin B-12 (VITAMIN B-12) 500 mcg tablet Take 500 mcg by mouth daily     • cholecalciferol (VITAMIN D3) 1,000 units tablet Take 2,000 Units by mouth daily (Patient not taking: Reported on 11/14/2023)     • cyclobenzaprine (FLEXERIL) 5 mg tablet Take 1 tablet (5 mg total) by mouth 3 (three) times a day as needed for muscle spasms for up to 9 doses (Patient not taking: Reported on 10/24/2023) 9 tablet 0   • fluticasone (FLONASE) 50 mcg/act nasal spray 2 sprays into each nostril daily  (Patient not taking: Reported on 11/19/2021)     • predniSONE 10 mg tablet if needed (Patient not taking: Reported on 11/17/2022)     • tamsulosin (FLOMAX) 0.4 mg Take 0.4 mg by mouth daily with dinner (Patient not taking: Reported on 5/6/2024)       No  "current facility-administered medications for this visit.       Allergies  Allergies   Allergen Reactions   • Sulfa Antibiotics Hives       Past Medical History, Social History, Family History, medications and allergies were reviewed.    Vitals  Vitals:    05/06/24 1000   BP: 130/80   BP Location: Left arm   Patient Position: Sitting   Cuff Size: Standard   Pulse: 88   SpO2: 98%   Weight: 86.2 kg (190 lb)   Height: 5' 7\" (1.702 m)       Physical Exam  On examination he is in no acute distress.  Gait normal.  Affect normal      Results  No results found for: \"PSA\"  Lab Results   Component Value Date    GLUCOSE 87 08/01/2015    CALCIUM 9.4 04/24/2024     08/01/2015    K 4.1 04/24/2024    CO2 28 04/24/2024     04/24/2024    BUN 18 04/24/2024    CREATININE 1.25 04/24/2024     Lab Results   Component Value Date    WBC 5.29 04/24/2024    HGB 15.0 04/24/2024    HCT 46.1 04/24/2024    MCV 92 04/24/2024     04/24/2024       Office Urine Dip  Recent Results (from the past 1 hour(s))   POCT urine dip    Collection Time: 05/06/24 10:11 AM   Result Value Ref Range    LEUKOCYTE ESTERASE,UA -     NITRITE,UA -     SL AMB POCT UROBILINOGEN 0.2     POCT URINE PROTEIN -      PH,UA 5.0     BLOOD,UA -     SPECIFIC GRAVITY,UA 1.010     KETONES,UA -     BILIRUBIN,UA -     GLUCOSE, UA -      COLOR,UA yellow     CLARITY,UA clear    POCT Measure PVR    Collection Time: 05/06/24 10:12 AM   Result Value Ref Range    POST-VOID RESIDUAL VOLUME, ML POC 29 mL   ]  "

## 2024-05-06 NOTE — ASSESSMENT & PLAN NOTE
Impression: Postvoid dribbling, history of right UPJ obstruction with atrophic right kidney    Plan: Based on the patient's degree of lower urinary tract symptoms and only 38 years of age, I recommend office cystoscopy to assess his urethra for possible stricture or narrowing.  With regards to his right UPJ obstruction recent MAG3 renal scan with Lasix shows 99% split function on the left.  The right kidney is essentially atrophic and as long as he remains asymptomatic without infections no surgical intervention is warranted at this time.  He will return for cystoscopy as above.

## 2024-05-21 ENCOUNTER — PROCEDURE VISIT (OUTPATIENT)
Dept: UROLOGY | Facility: CLINIC | Age: 39
End: 2024-05-21
Payer: COMMERCIAL

## 2024-05-21 VITALS
HEART RATE: 88 BPM | HEIGHT: 67 IN | OXYGEN SATURATION: 95 % | SYSTOLIC BLOOD PRESSURE: 126 MMHG | WEIGHT: 188 LBS | BODY MASS INDEX: 29.51 KG/M2 | DIASTOLIC BLOOD PRESSURE: 84 MMHG

## 2024-05-21 DIAGNOSIS — R39.15 URGENCY OF URINATION: ICD-10-CM

## 2024-05-21 DIAGNOSIS — Q62.11 HYDRONEPHROSIS WITH URETEROPELVIC JUNCTION (UPJ) OBSTRUCTION: ICD-10-CM

## 2024-05-21 DIAGNOSIS — R32 URINARY INCONTINENCE, UNSPECIFIED TYPE: ICD-10-CM

## 2024-05-21 DIAGNOSIS — N39.43 POST-VOID DRIBBLING: Primary | ICD-10-CM

## 2024-05-21 LAB
SL AMB  POCT GLUCOSE, UA: NORMAL
SL AMB LEUKOCYTE ESTERASE,UA: NORMAL
SL AMB POCT BILIRUBIN,UA: NORMAL
SL AMB POCT BLOOD,UA: NORMAL
SL AMB POCT CLARITY,UA: CLEAR
SL AMB POCT COLOR,UA: YELLOW
SL AMB POCT KETONES,UA: NORMAL
SL AMB POCT NITRITE,UA: NORMAL
SL AMB POCT PH,UA: 6.5
SL AMB POCT SPECIFIC GRAVITY,UA: 1
SL AMB POCT URINE PROTEIN: NORMAL
SL AMB POCT UROBILINOGEN: 0.2

## 2024-05-21 PROCEDURE — 81002 URINALYSIS NONAUTO W/O SCOPE: CPT | Performed by: UROLOGY

## 2024-05-21 PROCEDURE — 52000 CYSTOURETHROSCOPY: CPT | Performed by: UROLOGY

## 2024-05-21 RX ORDER — OXYBUTYNIN CHLORIDE 10 MG/1
10 TABLET, EXTENDED RELEASE ORAL
Qty: 90 TABLET | Refills: 3 | Status: SHIPPED | OUTPATIENT
Start: 2024-05-21

## 2024-05-21 NOTE — PROGRESS NOTES
Cystoscopy     Date/Time  5/21/2024 9:30 AM     Performed by  Abdirahman Palacios MD   Authorized by  Abdirahman Palacios MD         Vinay is a 38-year-old male with a history of a right UPJ obstruction with an atrophic right kidney.  A previously performed MAG3 renal scan with Lasix shows 99 percent function of the left kidney.  He complains of postvoid dribbling and a variable urinary stream.  He also complains of urgency and frequency as well as nocturia.  He presents today for cystoscopy to exclude a urethral stricture.      Male cystoscopy procedure note:  Risk and benefits of flexible cystoscopy were discussed. Informed consent was obtained. The patient was placed in the supine position. His genitalia was prepped and draped in a sterile fashion. Viscous lidocaine jelly was instilled into the urethra and flexible cystoscopy was then performed. The urethra, prostatic urethra, and bladder were all thoroughly inspected there was no evidence of mucosal abnormalities or lesions. Both ureteral orifices were visualized with clear efflux of urine. Retroflexion was normal. Overall this was a negative cystoscopy without mucosal abnormalities or urethral stricture disease.    Impression: Voiding dysfunction/overactive bladder    Plan: I provided the patient and his mother with reassurance that there is no sign of urethral stricture disease.  His lower urinary tract symptoms are likely related to overactivity of his bladder.  I recommend a trial of oxybutynin XL 10 mg daily.  Follow-up is recommended in the next 2 to 3 months with a postvoid residual assessment and uroflow evaluation.  Side effect profile of medication discussed and reviewed.

## 2024-06-04 ENCOUNTER — PATIENT MESSAGE (OUTPATIENT)
Dept: UROLOGY | Facility: CLINIC | Age: 39
End: 2024-06-04

## 2024-06-04 DIAGNOSIS — R39.15 URGENCY OF URINATION: Primary | ICD-10-CM

## 2024-06-05 ENCOUNTER — TELEPHONE (OUTPATIENT)
Dept: UROLOGY | Facility: CLINIC | Age: 39
End: 2024-06-05

## 2024-06-05 DIAGNOSIS — R39.15 URGENCY OF URINATION: ICD-10-CM

## 2024-06-05 RX ORDER — TROSPIUM CHLORIDE 20 MG/1
20 TABLET, FILM COATED ORAL 2 TIMES DAILY
Qty: 60 TABLET | Refills: 3 | Status: SHIPPED | OUTPATIENT
Start: 2024-06-05

## 2024-06-05 NOTE — LETTER
To whom it may,     Trospium 20 mg is an anticholinergic medication and that is utilized for overactive bladder symptoms. Vinay has complaints of urinary urgency/frequency and postvoid dribbling.  These urinary complaints have been worked up extensively.  All of his testing and workup has been negative for any underlying reason that would be causing these urinary symptoms.  After negative workup it was determined that he had overactive bladder.  This medication will help to minimize the symptoms of overactive bladder and could ultimately leave him symptom free.  The symptoms are affecting his activities of daily living and he needs the medication to help him return to a normal lifestyle.  If you have any additional questions or concerns we can be contacted at [phone].      Thank you,     SORAYA Aguirre

## 2024-06-05 NOTE — LETTER
July 18, 2024     Patient: Vinay Breen  YOB: 1985  Date of Visit: 6/5/2024      To Whom it May Concern:    Vinay Breen is under my professional care. Vinay was seen in my office on 6/5/2024. Vinay {Return to school/sport/work:[id number]}.    If you have any questions or concerns, please don't hesitate to call.         Sincerely,          SORAYA Aguirre        CC: No Recipients

## 2024-06-06 NOTE — TELEPHONE ENCOUNTER
Rosette from [facility] calling to inform they cannot provide alternative options, he stated typically the insurance company needs to provide the alternative medication options because they know what they will or will not cover.

## 2024-06-17 RX ORDER — TROSPIUM CHLORIDE 20 MG/1
20 TABLET, FILM COATED ORAL 2 TIMES DAILY
Qty: 60 TABLET | Refills: 3 | OUTPATIENT
Start: 2024-06-17

## 2024-07-16 NOTE — TELEPHONE ENCOUNTER
Patient called to check the status on the trospium medication order. I told him it was canceled because pharmacy said it wasn't covered. He said he will call his insurance and see what alternatives they have and will call us back.

## 2024-07-16 NOTE — TELEPHONE ENCOUNTER
Pt called back, sts he spoke with his insurance and they are requesting a letter of necessity from the MD explaining why it is necessary for pt to take Trospium.  Please review and advise.    Pt call back: [phone]

## 2024-07-17 ENCOUNTER — CLINICAL SUPPORT (OUTPATIENT)
Dept: NUTRITION | Facility: HOSPITAL | Age: 39
End: 2024-07-17
Payer: COMMERCIAL

## 2024-07-17 VITALS — HEIGHT: 67 IN | BODY MASS INDEX: 29.62 KG/M2 | WEIGHT: 188.7 LBS

## 2024-07-17 DIAGNOSIS — N18.31 CHRONIC KIDNEY DISEASE, STAGE 3A (HCC): Primary | ICD-10-CM

## 2024-07-17 PROCEDURE — 97803 MED NUTRITION INDIV SUBSEQ: CPT

## 2024-07-17 NOTE — PROGRESS NOTES
Follow-Up Nutrition Assessment Form    Patient Name: Vinay Breen    YOB: 1985    Sex: Male      Follow Up Date: 7/17/2024  Start Time: 10:30am Stop Time: 11am Total Minutes: 30 mins     Data:  Present at session: self   Parent/Patient Concerns: Experiencing constipation   Medical Dx/Reason for Referral: N18.31, CKD3   Past Medical History:   Diagnosis Date    Renal failure        Current Outpatient Medications   Medication Sig Dispense Refill    acetaminophen (TYLENOL) 325 mg tablet Take 650 mg by mouth every 6 (six) hours as needed for mild pain      albuterol (PROVENTIL HFA,VENTOLIN HFA) 90 mcg/act inhaler Inhale 2 puffs every 6 (six) hours as needed for wheezing      Ascorbic Acid (vitamin C) 100 MG tablet Take 100 mg by mouth daily      Cholecalciferol (VITAMIN D PO) Take 5,000 Units by mouth daily       cholecalciferol (VITAMIN D3) 1,000 units tablet Take 2,000 Units by mouth daily (Patient not taking: Reported on 11/14/2023)      cyclobenzaprine (FLEXERIL) 5 mg tablet Take 1 tablet (5 mg total) by mouth 3 (three) times a day as needed for muscle spasms for up to 9 doses (Patient not taking: Reported on 10/24/2023) 9 tablet 0    erythromycin (ILOTYCIN) ophthalmic ointment Apply 1/2 inch ribbon to both lower eyelids three times a day for 7 days. Use after cleansing and warm compress.      fluticasone (FLONASE) 50 mcg/act nasal spray 2 sprays into each nostril daily  (Patient not taking: Reported on 11/19/2021)      prednisoLONE acetate (PRED FORTE) 1 % ophthalmic suspension PLEASE SEE ATTACHED FOR DETAILED DIRECTIONS      predniSONE 10 mg tablet if needed (Patient not taking: Reported on 11/17/2022)      tamsulosin (FLOMAX) 0.4 mg Take 0.4 mg by mouth daily with dinner (Patient not taking: Reported on 5/6/2024)      vitamin B-12 (VITAMIN B-12) 500 mcg tablet Take 500 mcg by mouth daily       No current facility-administered medications for this visit.        Additional Meds/Supplements:   "  Barriers to Learning:    Labs:    Height: Ht Readings from Last 3 Encounters:   07/17/24 5' 7\" (1.702 m)   05/21/24 5' 7\" (1.702 m)   05/06/24 5' 7\" (1.702 m)      Weight: Wt Readings from Last 10 Encounters:   07/17/24 85.6 kg (188 lb 11.2 oz)   05/21/24 85.3 kg (188 lb)   05/06/24 86.2 kg (190 lb)   03/12/24 86.3 kg (190 lb 4.1 oz)   11/14/23 84.4 kg (186 lb)   11/07/23 83.3 kg (183 lb 11.2 oz)   10/24/23 83.9 kg (185 lb)   08/02/23 80.3 kg (177 lb)   05/22/23 84 kg (185 lb 3 oz)   04/12/23 81.8 kg (180 lb 6.4 oz)     Estimated body mass index is 29.55 kg/m² as calculated from the following:    Height as of this encounter: 5' 7\" (1.702 m).    Weight as of this encounter: 85.6 kg (188 lb 11.2 oz).   Wt. Change Since Last Visit: [x]Yes     []No  Amount: Fluctuations, over a year  has gained 11lbs      Energy Needs: Carli- [place] Equation:  x1.4 minus 750-1000cal 1443-1693cal, 68-85.6g .8-1g/kg,  1443-1693mL 1ml/luis enrique    Pain Screen: Are you having pain now?       Previous Goals or Goals Achieved:     1. Avoid skipping breakfast, consume 3 balanced meals at least 2 of the meals using the plate method by f/u- 4/12 meeting, has been consuming eggs, wheat bread, or cereal with 1% milk, continue, 8/2 1 egg, with wheat toast, 1%milk, coffee or cereal captain crunch, cheerios, gold apolinar with 1% milk, continue, 11/7 met/not met, continues to consume breakfast, 1 egg/sometimes bread, coffee, water, has had challenges with following My plate for meals, continue by f/u, 3/12 met, continue by f/u-7/17- improving, consuming breakfast, started adding higher fiber cereal frosted shredded wheat, but has been using whole milk, suggested low fat milk, and consumes egg, for lunch has been adding carrots with 1 slice of pizza, at dinner has wings, ribs, pork chops, potatoes, sometimes fruit -continue by f/u   2. Replace high salty snacks with fresh fruit or vegetables or lower sodium snacks by f/u-4/12 improving has been " consuming low sodium crackers, has been cutting back on doritos, pringles, fritos and looking at labels more, continue, 8/2 met continues to use low sodium, or no salt add crackers, but not consuming much of fruits/vegetables as snack, still eats corn chips, doritos but has decreased amount, continue f/u, 11/7 not met, continue by f/u, 3/12- met continue by f/u but replaced with welches fruit snack, provided other healthier snack ideas continue by f/u -7/17, has cut back on salty snacks but looking to improve fruit/veggie intake, has been eating crackers, suggested to look for low Na crackers, continue by f/u   3. Aim for 2-4 lb weight loss x 1 month by f/u- 4/12 met, lost 8lbs/ from last month, continue to lose weight to a normal BMI range by f/u, 8/2 not met, weight increased in May but then back down overall lost 3lbs in 4 month, continue by f/u, 11/7 not met, continue by f/u, 7/17 not met, continue by f/u      4. Participate in physical activity at least 30-60 mins 3x week by f/u- 8/2 not met has been walking 1 lap around a park and then plays basketball for about 30-45 mins 1 x week, continue by f/u, 11/7 met, continue by f/u, 3/12 not met, but improving, has gone to [facility], walks on the indoor track but hasn't been consistently going, continue by f/u, 7/17 not met, only walks 1-2x week,  continue by f/u   5. Limit to 2000mg of Na a day, limit processed meats, frozen pizza, canned beans and sauces, read food labels to monitor by f/u, 11/7 not met, continue by f/u, 3/12 not met, has been eating frozen meals, fast food, cooks with salt, discussed tips for flavoring food versus salt, continue by f/u 7/17 improving, continue by f/u  New Goals:   5. Decrease saturated fat/cholesterol intake switch from wings to chicken breast or wings without skin, avoid ribs, consider introducing more fish weekly, limit butter consider whipped butter or plant based butter spreads, olive oil or non fat cooking  spray, limit whole milk and cheeses switch to low fat dairy by f/u.   2.   3.       Initial PES:     Food and nutrition related knowledge deficit  related to Lack of prior exposure to accurate nutrition related information as evidenced by Conditions associated with diagnosis or treatment -continue      New PES:       New Problem List:  1.   2.   3.       Assessment:  Vinay presents for nutrition counseling f/u. Vinay is concerned with constipation. Reviewed nutrition therapy diet education for constipation, discussed increasing fiber intake, water intake and physical activity, Reinforced goals with limiting sodium, high fat and consuming balanced meals.      Medical Nutrition Therapy Intervention:  [x]Individualized Meal Plan- Discussed the plate method of portioning foods, including half a plate fruits and vegetables or a half plate all vegetables, 1/4 of the plate a lean protein source or meat, and a 1/4 of the plate being a whole grain carb- usually 1/2-1c.  This should be followed for at least 2 meals of the day, but could also be followed for all 3. Reviewed nutrition therapy diet education for constipation, discussed tips to increase fiber, water intake and physical activity. Reviewed tips to cut back on saturated fat and cholesterol.  [x]Understanding Lab Values- 4/24/24 cholesterol 216,    []Basic Pathophysiology of Disease []Food/Medication Interactions   []Food Diary [x]Exercise- 150 mins of moderate activity weekly, discussed importance of variety of activity, including wt bearing activities 2-3x/wk x 10-15mins. Discussed importance of activity throughout the lifecycle and its impact on overall health/stress management, etc.    []Lifestyle/Behavior Modification Techniques []Medication, Mechanism of Action   []Label Reading []Self Blood Glucose Monitoring   []Weight/BMI Goals []Other -    Other Notes:        Comprehension: []Excellent  []Very Good  []Good  [x]Fair   []Poor    Receptivity:  "[]Excellent  []Very Good  [x]Good  []Fair   []Poor    Expected Compliance: []Excellent  []Very Good  []Good  [x]Fair   []Poor      Labs:  CMP  Lab Results   Component Value Date     08/01/2015    K 4.1 04/24/2024     04/24/2024    CO2 28 04/24/2024    ANIONGAP 7 08/01/2015    BUN 18 04/24/2024    CREATININE 1.25 04/24/2024    GLUCOSE 87 08/01/2015    GLUF 77 04/24/2024    CALCIUM 9.4 04/24/2024    AST 24 04/24/2024    ALT 22 04/24/2024    ALKPHOS 52 04/24/2024    EGFR 73 04/24/2024       BMP  Lab Results   Component Value Date    GLUCOSE 87 08/01/2015    CALCIUM 9.4 04/24/2024     08/01/2015    K 4.1 04/24/2024    CO2 28 04/24/2024     04/24/2024    BUN 18 04/24/2024    CREATININE 1.25 04/24/2024       Lipids  Lab Results   Component Value Date    CHOL 197 09/24/2015     Lab Results   Component Value Date    HDL 41 04/24/2024    HDL 41 02/24/2019    HDL 37 (L) 07/31/2016     Lab Results   Component Value Date    LDLCALC 146 (H) 04/24/2024    LDLCALC 142 (H) 02/24/2019    LDLCALC 123 (H) 07/31/2016     Lab Results   Component Value Date    TRIG 143 04/24/2024    TRIG 115 02/24/2019    TRIG 96 07/31/2016     No results found for: \"CHOLHDL\"    Hemoglobin A1C  No results found for: \"HGBA1C\"    Fasting Glucose  Lab Results   Component Value Date    GLUF 77 04/24/2024       Insulin     Thyroid  Lab Results   Component Value Date    TSH 1.75 02/21/2022       Hepatic Function Panel  Lab Results   Component Value Date    ALT 22 04/24/2024    AST 24 04/24/2024    ALKPHOS 52 04/24/2024       Celiac Disease Antibody Panel  No results found for: \"ENDOMYSIAL IGA\", \"GLIADIN IGA\", \"GLIADIN IGG\", \"IGA\", \"TISSUE TRANSGLUT AB\", \"TTG IGA\"   Iron  No results found for: \"IRON\", \"TIBC\", \"FERRITIN\"    Vitamins  No results found for: \"VITAMIN B2\"   No results found for: \"NICOTINAMIDE\", \"NICOTINIC ACID\"   No results found for: \"VITAMINB6\"  No results found for: \"TWDIGBWB43\"  No results found for: \"VITB5\"  No results " "found for: \"B2AUXYWZ\"  No results found for: \"THYROGLB\"  No results found for: \"VITAMIN K\"   No results found for: \"25-HYDROXY VIT D\"   No components found for: \"VITAMINE\"     No follow-ups on file.    Bj Byrne RD  [facility]  [address] [place] [phone]    "

## 2024-07-17 NOTE — TELEPHONE ENCOUNTER
Medication is needed for overactive bladder symptoms.  He has complaints of urinary urgency/frequency and postvoid dribbling.  Medication will help to minimize the symptoms of overactive bladder and could ultimately leave him symptoms free.  The symptoms are affecting his activities of daily living.  He has been worked up and full for other underlying issues that might be contributing to his symptoms and everything has been negative.

## 2024-07-18 NOTE — TELEPHONE ENCOUNTER
Please call the patient and let him know that I placed a letter in his MyChart that he can use to send to his insurance company regarding the medications.  Can you please ask him what questions he has as he has a follow-up appointment scheduled with me in a couple of weeks to discuss plan of care moving forward.

## 2024-07-18 NOTE — TELEPHONE ENCOUNTER
Patient requesting phone call back from AP or doctor regarding medications/medical issues.       Patient is under care of  and will be seeing Meenu on 8/7/24.     CB: [phone]

## 2024-07-22 NOTE — TELEPHONE ENCOUNTER
Left message per communication form advising patient of AP's note, of letter has been placed into patient's MyChart to provide to his insurance company. Also advised of upcoming appointment scheduled on August. Office number provided with any questions or concerns.

## 2024-08-06 ENCOUNTER — TELEPHONE (OUTPATIENT)
Age: 39
End: 2024-08-06

## 2024-08-06 NOTE — TELEPHONE ENCOUNTER
Patient called today to ask that the letter that JORGE LUIS Uribe wrote for him be printed out.    Pt would like to pick the letter up during his OV on 8/7 at 10:40 AM in [facility].     Patient might bring fax number for his Insurance and might need assistance faxing it.    Call back if needed [phone]

## 2024-08-07 ENCOUNTER — OFFICE VISIT (OUTPATIENT)
Dept: UROLOGY | Facility: AMBULATORY SURGERY CENTER | Age: 39
End: 2024-08-07
Payer: COMMERCIAL

## 2024-08-07 ENCOUNTER — TELEPHONE (OUTPATIENT)
Age: 39
End: 2024-08-07

## 2024-08-07 VITALS
DIASTOLIC BLOOD PRESSURE: 80 MMHG | SYSTOLIC BLOOD PRESSURE: 110 MMHG | HEART RATE: 94 BPM | HEIGHT: 67 IN | BODY MASS INDEX: 29.66 KG/M2 | WEIGHT: 189 LBS | OXYGEN SATURATION: 99 %

## 2024-08-07 DIAGNOSIS — N32.81 OVERACTIVE BLADDER: Primary | ICD-10-CM

## 2024-08-07 DIAGNOSIS — N18.31 STAGE 3A CHRONIC KIDNEY DISEASE (HCC): ICD-10-CM

## 2024-08-07 DIAGNOSIS — Q62.39 UPJ OBSTRUCTION, CONGENITAL: ICD-10-CM

## 2024-08-07 PROCEDURE — 99213 OFFICE O/P EST LOW 20 MIN: CPT

## 2024-08-07 RX ORDER — MIRABEGRON 25 MG/1
25 TABLET, FILM COATED, EXTENDED RELEASE ORAL DAILY
Qty: 30 TABLET | Refills: 4 | Status: SHIPPED | OUTPATIENT
Start: 2024-08-07

## 2024-08-07 RX ORDER — TROSPIUM CHLORIDE 20 MG/1
20 TABLET, FILM COATED ORAL 2 TIMES DAILY
Qty: 60 TABLET | Refills: 6 | Status: SHIPPED | OUTPATIENT
Start: 2024-08-07

## 2024-08-07 NOTE — ASSESSMENT & PLAN NOTE
MAG3 renal scan with Lasix shows 99% split function on the left side  Right kidney is essentially atrophic, was discussed with patient at last office visit with Dr. Palacios that as long as he remains asymptomatic without infections no additional surgical intervention is required

## 2024-08-07 NOTE — ASSESSMENT & PLAN NOTE
Bothersome urinary symptoms of postvoid dribbling and urinary incontinence  Underwent cystoscopy in May 2024 by Dr. Palacios which was unremarkable and benign with no concern for urethral stricture or narrowing  He was initiated on 10 mg extended release oxybutynin at his last office visit but stopped the medication due to undesirable side effect of extreme drowsiness, trospium 20 mg twice daily was sent to pharmacy but unfortunately has not been able to start the medication due to insurance issues, will plan to fax medical necessity of medication to insurance company to help obtain prescription  Referral placed for pelvic floor physical therapy  Follow-up in 4 months after patient has obtained medication and initiated it and after pelvic floor physical therapy consultation, PVR and AUA at that time

## 2024-08-07 NOTE — TELEPHONE ENCOUNTER
Please call the patient back and let him know that I heard back from his insurance company regarding the trospium.  Unfortunately this medication is not covered by his insurance.  They did send a list of medications that are covered and I prescribed him Myrbetriq 25 mg instead.  Please let him know that I actually prefer this medication over the trospium due to less side effects.  The biggest side effect can be increase in blood pressure but his blood pressures have remained stable and we can certainly keep an eye on this.  I did receive an alert that this requires a prior authorization so it may take a couple of days for the medication to be approved through his pharmacy but he should be able to pick it up by the end of the week.  If he has any additional questions or concerns he can reach out to the office.

## 2024-08-07 NOTE — TELEPHONE ENCOUNTER
PA for trospium chloride (SANCTURA) 20 mg tablet SUBMITTED     via    [x][facility] [id number]  []Surescripts-Case ID #   []Faxed to plan   []Other website   []Phone call Case ID #     Office notes sent, clinical questions answered. Awaiting determination    Turnaround time for your insurance to make a decision on your Prior Authorization can take 7-21 business days.

## 2024-08-07 NOTE — TELEPHONE ENCOUNTER
Reason for call:   [] Refill   [x] Prior Auth  [] Other:     Office:   [] PCP/Provider -   [x] Specialty/Provider - PALLAVI moreno    Medication: trospium chloride

## 2024-08-07 NOTE — PROGRESS NOTES
Assessment and plan:     UPJ obstruction, congenital  MAG3 renal scan with Lasix shows 99% split function on the left side  Right kidney is essentially atrophic, was discussed with patient at last office visit with Dr. Palacios that as long as he remains asymptomatic without infections no additional surgical intervention is required    Overactive bladder  Bothersome urinary symptoms of postvoid dribbling and urinary incontinence  Underwent cystoscopy in May 2024 by Dr. Palacios which was unremarkable and benign with no concern for urethral stricture or narrowing  He was initiated on 10 mg extended release oxybutynin at his last office visit but unfortunately has not been able to start the medication due to insurance issues, will plan to fax medical necessity of medication to insurance company to help obtain prescription  Referral placed for pelvic floor physical therapy  Follow-up in 4 months after patient has obtained medication and initiated it and after pelvic floor physical therapy consultation, PVR and AUA at that time      History of Present Illness     Vinay Breen is a 38 y.o. male who presents today to the office for follow-up of overactive bladder and UPJ obstruction.  He was last seen in May 2024 by Dr. Palacios.  At that time he underwent a cystoscopy to make sure that he did not have any underlying urethral stricture or abnormality that was contributing to his postvoid dribbling.  His cystoscopy was overall unremarkable and benign.  He was started on oxybutynin extended release 10 mg daily to help with bothersome urinary symptoms.  He had bothersome complaints after initiating the oxybutynin with extreme drowsiness and he wished to trial a different medication.  Trospium 20 mg twice daily was called into his pharmacy but he has not been able to obtain it due to insurance purposes yet.  I did discuss with him that we are currently working on this with the prior authorization team as well as provided a  "letter for medical necessity to fax to his insurance.    He also has history of UPJ obstruction with an atrophic right kidney.  He had a MAG3 Lasix renal scan which demonstrated a 99% split function on the left side.  The right kidney is essentially atrophic.  It was discussed with the patient and his mom that as long as he remains asymptomatic and without infections there is no need for any additional surgical intervention.    Today in the office he states he still has not been able to obtain the trospium prescription.  He continues with bothersome urinary symptoms of occasional incontinence with postvoid dribbling.  He wishes to trial pelvic floor physical therapy and continue to try and obtain the trospium medication.  He is here today in the office with his mother.    Laboratory     Lab Results   Component Value Date    BUN 18 04/24/2024    CREATININE 1.25 04/24/2024       No components found for: \"GFR\"    Lab Results   Component Value Date    GLUCOSE 87 08/01/2015    CALCIUM 9.4 04/24/2024     08/01/2015    K 4.1 04/24/2024    CO2 28 04/24/2024     04/24/2024       Lab Results   Component Value Date    WBC 5.29 04/24/2024    HGB 15.0 04/24/2024    HCT 46.1 04/24/2024    MCV 92 04/24/2024     04/24/2024       No results found for: \"PSA\"    No results found for this or any previous visit (from the past 1 hour(s)).    Review of Systems     Review of Systems   Constitutional:  Negative for chills and fever.   Respiratory: Negative.  Negative for cough and shortness of breath.    Cardiovascular: Negative.  Negative for chest pain.   Gastrointestinal: Negative.  Negative for abdominal distention, abdominal pain, nausea and vomiting.   Genitourinary:  Negative for decreased urine volume, difficulty urinating, dysuria, flank pain, frequency, hematuria, penile discharge, penile pain, penile swelling, scrotal swelling, testicular pain and urgency.   Skin: Negative.  Negative for rash.   Neurological: " "Negative.    Hematological:  Negative for adenopathy. Does not bruise/bleed easily.       Allergies     Allergies   Allergen Reactions    Sulfa Antibiotics Hives       Physical Exam     Physical Exam  Vitals reviewed.   Constitutional:       Appearance: Normal appearance.   HENT:      Head: Normocephalic and atraumatic.   Eyes:      Pupils: Pupils are equal, round, and reactive to light.   Cardiovascular:      Rate and Rhythm: Normal rate.   Pulmonary:      Effort: Pulmonary effort is normal.   Musculoskeletal:      Cervical back: Normal range of motion.   Skin:     General: Skin is warm and dry.   Neurological:      General: No focal deficit present.      Mental Status: He is alert and oriented to person, place, and time.   Psychiatric:         Mood and Affect: Mood normal.         Behavior: Behavior normal.         Thought Content: Thought content normal.         Judgment: Judgment normal.         Vital Signs     Vitals:    08/07/24 1045   BP: 110/80   BP Location: Left arm   Patient Position: Sitting   Cuff Size: Standard   Pulse: 94   SpO2: 99%   Weight: 85.7 kg (189 lb)   Height: 5' 7\" (1.702 m)       Current Medications       Current Outpatient Medications:     acetaminophen (TYLENOL) 325 mg tablet, Take 650 mg by mouth every 6 (six) hours as needed for mild pain, Disp: , Rfl:     albuterol (PROVENTIL HFA,VENTOLIN HFA) 90 mcg/act inhaler, Inhale 2 puffs every 6 (six) hours as needed for wheezing, Disp: , Rfl:     Ascorbic Acid (vitamin C) 100 MG tablet, Take 100 mg by mouth daily, Disp: , Rfl:     Cholecalciferol (VITAMIN D PO), Take 5,000 Units by mouth daily , Disp: , Rfl:     erythromycin (ILOTYCIN) ophthalmic ointment, Apply 1/2 inch ribbon to both lower eyelids three times a day for 7 days. Use after cleansing and warm compress., Disp: , Rfl:     prednisoLONE acetate (PRED FORTE) 1 % ophthalmic suspension, PLEASE SEE ATTACHED FOR DETAILED DIRECTIONS, Disp: , Rfl:     trospium chloride (SANCTURA) 20 mg " tablet, Take 1 tablet (20 mg total) by mouth 2 (two) times a day, Disp: 60 tablet, Rfl: 6    vitamin B-12 (VITAMIN B-12) 500 mcg tablet, Take 500 mcg by mouth daily, Disp: , Rfl:     cholecalciferol (VITAMIN D3) 1,000 units tablet, Take 2,000 Units by mouth daily (Patient not taking: Reported on 11/14/2023), Disp: , Rfl:     cyclobenzaprine (FLEXERIL) 5 mg tablet, Take 1 tablet (5 mg total) by mouth 3 (three) times a day as needed for muscle spasms for up to 9 doses (Patient not taking: Reported on 10/24/2023), Disp: 9 tablet, Rfl: 0    fluticasone (FLONASE) 50 mcg/act nasal spray, 2 sprays into each nostril daily  (Patient not taking: Reported on 11/19/2021), Disp: , Rfl:     predniSONE 10 mg tablet, if needed (Patient not taking: Reported on 11/17/2022), Disp: , Rfl:     tamsulosin (FLOMAX) 0.4 mg, Take 0.4 mg by mouth daily with dinner (Patient not taking: Reported on 5/6/2024), Disp: , Rfl:     Active Problems     Patient Active Problem List   Diagnosis    Hydronephrosis with ureteropelvic junction (UPJ) obstruction    Stage 3a chronic kidney disease (HCC)    UPJ obstruction, congenital    Post-void dribbling    Overactive bladder       Past Medical History     Past Medical History:   Diagnosis Date    Renal failure        Surgical History     Past Surgical History:   Procedure Laterality Date    CYSTOSCOPY  05/21/2024    Dr. Palacios    TONSILLECTOMY AND ADENOIDECTOMY         Family History     Family History   Problem Relation Age of Onset    Stroke Father     Diabetes Father     Kidney disease Paternal Aunt     Kidney disease Paternal Uncle     Kidney disease Paternal Grandfather     Cancer Maternal Grandmother     Heart disease Maternal Grandmother        Social History     Social History     Social History     Tobacco Use   Smoking Status Never   Smokeless Tobacco Never       Past Surgical History:   Procedure Laterality Date    CYSTOSCOPY  05/21/2024    Dr. Palacios    TONSILLECTOMY AND ADENOIDECTOMY            The following portions of the patient's history were reviewed and updated as appropriate: allergies, current medications, past family history, past medical history, past social history, past surgical history and problem list    Please note :  Voice dictation software has been used to create this document.  There may be inadvertent transcription errors.    SORAYA Aguirre

## 2024-08-07 NOTE — TELEPHONE ENCOUNTER
PA for trospium chloride (SANCTURA) 20 mg tablet Denied    Reason:(Screenshot if applicable)        Message sent to office clinical pool Yes    Denial letter scanned into Media Yes    Appeal started No ( Provider will need to decide if appeal is warranted and send clinical documentation to Prior Authorization Team for initiation.)    **Please follow up with your patient regarding denial and next steps**

## 2024-08-08 NOTE — TELEPHONE ENCOUNTER
Patient called and I read him Meenu's- AP message about calling in MyrAscendify. He understood and said he would go to the pharmacy to pick it up today.

## 2024-08-30 ENCOUNTER — TELEPHONE (OUTPATIENT)
Dept: NEPHROLOGY | Facility: CLINIC | Age: 39
End: 2024-08-30

## 2024-08-30 NOTE — TELEPHONE ENCOUNTER
Called patient schedule 1 year  follow up appointment (recall list). I offered patient 12/5 and 12/6 but patient asked for other days. Next appt available are January 2025.  Patient schedule his 1 year follow up on 1/17/25 at [facility] office with Dr. Franklin.

## 2024-12-09 ENCOUNTER — OFFICE VISIT (OUTPATIENT)
Dept: UROLOGY | Facility: AMBULATORY SURGERY CENTER | Age: 39
End: 2024-12-09

## 2024-12-09 VITALS
OXYGEN SATURATION: 95 % | WEIGHT: 192.6 LBS | DIASTOLIC BLOOD PRESSURE: 90 MMHG | BODY MASS INDEX: 30.23 KG/M2 | HEART RATE: 85 BPM | HEIGHT: 67 IN | SYSTOLIC BLOOD PRESSURE: 116 MMHG

## 2024-12-09 DIAGNOSIS — Q62.39 UPJ OBSTRUCTION, CONGENITAL: Primary | ICD-10-CM

## 2024-12-09 DIAGNOSIS — N32.81 OVERACTIVE BLADDER: ICD-10-CM

## 2024-12-09 LAB — POST-VOID RESIDUAL VOLUME, ML POC: 112 ML

## 2024-12-09 NOTE — ASSESSMENT & PLAN NOTE
Underwent cystoscopy in May 2024 by Dr. Palacios which was unremarkable and benign with no concern for urethral stricture or narrowing  He was initiated on 10 mg extended release oxybutynin at his last office visit but stopped the medication due to undesirable side effect of extreme drowsiness, trospium 20 mg twice daily not covered by insurance, trialed Myrbetriq for two days but discontinued medication due to drowsiness. He will trial Myrbetriq again for 4-6 weeks and take medication at night to help with side effects of drowsiness, we did discuss being consistent with the medication for 4-6 weeks to see if it is effective for his urinary symptoms  Referral placed for pelvic floor physical therapy at last office visit, has not attended any sessions to date, planning on making appointment for consultation, we did discuss that there may be some underlying pelvic floor muscle dysfunction that may be contributing to his symptoms   Follow up in 3 months     Orders:    POCT Measure PVR

## 2024-12-09 NOTE — PROGRESS NOTES
Name: Vinay Breen      : 1985      MRN: [id number]  Encounter Provider: SORAYA Aguirre  Encounter Date: 2024   Encounter department: [facility] [place]  :  Assessment & Plan  UPJ obstruction, congenital  MAG3 renal scan with Lasix shows 99% split function on the left side  Right kidney is essentially atrophic, was discussed with patient at last office visit with Dr. Palacios that as long as he remains asymptomatic without infections no additional surgical intervention is required    Orders:    POCT Measure PVR    Overactive bladder  Underwent cystoscopy in May 2024 by Dr. Palacios which was unremarkable and benign with no concern for urethral stricture or narrowing  He was initiated on 10 mg extended release oxybutynin at his last office visit but stopped the medication due to undesirable side effect of extreme drowsiness, trospium 20 mg twice daily not covered by insurance, trialed Myrbetriq for two days but discontinued medication due to drowsiness. He will trial Myrbetriq again for 4-6 weeks and take medication at night to help with side effects of drowsiness, we did discuss being consistent with the medication for 4-6 weeks to see if it is effective for his urinary symptoms  Referral placed for pelvic floor physical therapy at last office visit, has not attended any sessions to date, planning on making appointment for consultation, we did discuss that there may be some underlying pelvic floor muscle dysfunction that may be contributing to his symptoms   Follow up in 3 months     Orders:    POCT Measure PVR      History of Present Illness   Vinay Breen is a 38 y.o. male who presents today to the office for follow-up of overactive bladder and UPJ obstruction.  He was last seen in the office in 2024.  He is known to Dr. Palacios.    HPI 2024:   he underwent a cystoscopy to make sure that he did not have any underlying urethral stricture or abnormality that  was contributing to his postvoid dribbling.  His cystoscopy was overall unremarkable and benign.  He was started on oxybutynin extended release 10 mg daily to help with bothersome urinary symptoms.  He had bothersome complaints after initiating the oxybutynin with extreme drowsiness and he wished to trial a different medication.  Trospium 20 mg twice daily was called into his pharmacy but he has not been able to obtain it due to insurance purposes yet.  I did discuss with him that we are currently working on this with the prior authorization team as well as provided a letter for medical necessity to fax to his insurance.     He also has history of UPJ obstruction with an atrophic right kidney.  He had a MAG3 Lasix renal scan which demonstrated a 99% split function on the left side.  The right kidney is essentially atrophic.  It was discussed with the patient and his mom that as long as he remains asymptomatic and without infections there is no need for any additional surgical intervention.    Current HPI:  He was trialed on Myrbetriq but discontinued the medication due to undesirable side effects.  Unfortunately trospium is not covered by insurance and he was not able to be trialed on this medication.      He states that he took the Myrbetriq for 2 days and noticed drowsiness so we discontinued the medication.  He states that he does want to try the medication again and to take it at night to see if the drowsiness is not as bothersome.  He also states that he does drink a lot of water throughout the day and this might be contributing to his urinary symptoms.  He does state that he is scared that if he does not stay well-hydrated it will harm his left solitary kidney.  He also states that he feels as though he does not empty out his bladder consistently throughout the day this will also harm his left solitary kidney.  He states that he continues with urinary urgency and frequency throughout the day.  He also states  "that he continues with postvoid dribbling and occasional incontinence due to urgency.    Review of Systems   Constitutional:  Negative for chills and fever.   Respiratory: Negative.  Negative for cough and shortness of breath.    Cardiovascular: Negative.  Negative for chest pain.   Gastrointestinal: Negative.  Negative for abdominal distention, abdominal pain, nausea and vomiting.   Genitourinary:  Positive for difficulty urinating, frequency and urgency. Negative for decreased urine volume, dysuria, flank pain, hematuria, penile discharge, penile pain, penile swelling, scrotal swelling and testicular pain.   Skin: Negative.  Negative for rash.   Neurological: Negative.    Hematological:  Negative for adenopathy. Does not bruise/bleed easily.          Objective   There were no vitals taken for this visit.    Physical Exam  Vitals reviewed.   Constitutional:       Appearance: Normal appearance.   HENT:      Head: Normocephalic and atraumatic.   Eyes:      Pupils: Pupils are equal, round, and reactive to light.   Cardiovascular:      Rate and Rhythm: Normal rate.   Pulmonary:      Effort: Pulmonary effort is normal.   Musculoskeletal:      Cervical back: Normal range of motion.   Skin:     General: Skin is warm and dry.   Neurological:      General: No focal deficit present.      Mental Status: He is alert and oriented to person, place, and time.   Psychiatric:         Mood and Affect: Mood normal.         Behavior: Behavior normal.         Thought Content: Thought content normal.         Judgment: Judgment normal.          Results  No results found for: \"PSA\"  Lab Results   Component Value Date    GLUCOSE 87 08/01/2015    CALCIUM 9.4 04/24/2024     08/01/2015    K 4.1 04/24/2024    CO2 28 04/24/2024     04/24/2024    BUN 18 04/24/2024    CREATININE 1.25 04/24/2024     Lab Results   Component Value Date    WBC 5.29 04/24/2024    HGB 15.0 04/24/2024    HCT 46.1 04/24/2024    MCV 92 04/24/2024     " 04/24/2024       Office Urine Dip  No results found for this or any previous visit (from the past hour).]

## 2024-12-09 NOTE — ASSESSMENT & PLAN NOTE
MAG3 renal scan with Lasix shows 99% split function on the left side  Right kidney is essentially atrophic, was discussed with patient at last office visit with Dr. Palacios that as long as he remains asymptomatic without infections no additional surgical intervention is required    Orders:    POCT Measure PVR

## 2024-12-18 ENCOUNTER — CLINICAL SUPPORT (OUTPATIENT)
Dept: NUTRITION | Facility: HOSPITAL | Age: 39
End: 2024-12-18
Payer: COMMERCIAL

## 2024-12-18 VITALS — WEIGHT: 191.2 LBS | BODY MASS INDEX: 30.01 KG/M2 | HEIGHT: 67 IN

## 2024-12-18 DIAGNOSIS — N18.31 CHRONIC KIDNEY DISEASE (CKD) STAGE G3A/A1, MODERATELY DECREASED GLOMERULAR FILTRATION RATE (GFR) BETWEEN 45-59 ML/MIN/1.73 SQUARE METER AND ALBUMINURIA CREATININE RATIO LESS THAN 30 MG/G (HCC): Primary | ICD-10-CM

## 2024-12-18 DIAGNOSIS — N18.31 STAGE 3A CHRONIC KIDNEY DISEASE (HCC): Primary | ICD-10-CM

## 2024-12-18 PROCEDURE — 97803 MED NUTRITION INDIV SUBSEQ: CPT

## 2024-12-18 NOTE — PROGRESS NOTES
Follow-Up Nutrition Assessment Form    Patient Name: Vinay Breen    YOB: 1985    Sex: Male      Follow Up Date: 12/18/2024  Start Time: 10:30am Stop Time: 11am Total Minutes: 30 mins     Data:  Present at session: self   Parent/Patient Concerns: Weight gain   Medical Dx/Reason for Referral: N18.31, CKD3   Past Medical History:   Diagnosis Date    Renal failure        Current Outpatient Medications   Medication Sig Dispense Refill    acetaminophen (TYLENOL) 325 mg tablet Take 650 mg by mouth every 6 (six) hours as needed for mild pain      albuterol (PROVENTIL HFA,VENTOLIN HFA) 90 mcg/act inhaler Inhale 2 puffs every 6 (six) hours as needed for wheezing      Ascorbic Acid (vitamin C) 100 MG tablet Take 100 mg by mouth daily      Cholecalciferol (VITAMIN D PO) Take 5,000 Units by mouth daily       cholecalciferol (VITAMIN D3) 1,000 units tablet Take 2,000 Units by mouth daily (Patient not taking: Reported on 11/14/2023)      cyclobenzaprine (FLEXERIL) 5 mg tablet Take 1 tablet (5 mg total) by mouth 3 (three) times a day as needed for muscle spasms for up to 9 doses (Patient not taking: Reported on 10/24/2023) 9 tablet 0    erythromycin (ILOTYCIN) ophthalmic ointment Apply 1/2 inch ribbon to both lower eyelids three times a day for 7 days. Use after cleansing and warm compress.      fluticasone (FLONASE) 50 mcg/act nasal spray 2 sprays into each nostril daily  (Patient not taking: Reported on 11/19/2021)      Mirabegron ER 25 MG TB24 Take 25 mg by mouth in the morning (Patient not taking: Reported on 12/9/2024) 30 tablet 4    prednisoLONE acetate (PRED FORTE) 1 % ophthalmic suspension PLEASE SEE ATTACHED FOR DETAILED DIRECTIONS      predniSONE 10 mg tablet if needed (Patient not taking: Reported on 11/17/2022)      tamsulosin (FLOMAX) 0.4 mg Take 0.4 mg by mouth daily with dinner (Patient not taking: Reported on 5/6/2024)      trospium chloride (SANCTURA) 20 mg tablet Take 1 tablet (20 mg total) by  "mouth 2 (two) times a day (Patient not taking: Reported on 12/9/2024) 60 tablet 6    vitamin B-12 (VITAMIN B-12) 500 mcg tablet Take 500 mcg by mouth daily       No current facility-administered medications for this visit.        Additional Meds/Supplements:    Barriers to Learning:    Labs:    Height: Ht Readings from Last 3 Encounters:   12/18/24 5' 7\" (1.702 m)   12/09/24 5' 7\" (1.702 m)   08/07/24 5' 7\" (1.702 m)      Weight: Wt Readings from Last 10 Encounters:   12/18/24 86.7 kg (191 lb 3.2 oz)   12/09/24 87.4 kg (192 lb 9.6 oz)   08/07/24 85.7 kg (189 lb)   07/17/24 85.6 kg (188 lb 11.2 oz)   05/21/24 85.3 kg (188 lb)   05/06/24 86.2 kg (190 lb)   03/12/24 86.3 kg (190 lb 4.1 oz)   11/14/23 84.4 kg (186 lb)   11/07/23 83.3 kg (183 lb 11.2 oz)   10/24/23 83.9 kg (185 lb)     Estimated body mass index is 29.95 kg/m² as calculated from the following:    Height as of this encounter: 5' 7\" (1.702 m).    Weight as of this encounter: 86.7 kg (191 lb 3.2 oz).   Wt. Change Since Last Visit: [x]Yes     []No  Amount: Fluctuations, over a year  has gained 11lbs      Energy Needs: [place]- St. Jeor Equation:  x1.4 minus 750-1000cal 1443-1693cal, 68-85.6g .8-1g/kg,  1443-1693mL 1ml/luis enrique    Pain Screen: Are you having pain now?       Previous Goals or Goals Achieved:     1. Avoid skipping breakfast, consume 3 balanced meals at least 2 of the meals using the plate method by f/u- 4/12 meeting, has been consuming eggs, wheat bread, or cereal with 1% milk, continue, 8/2 1 egg, with wheat toast, 1%milk, coffee or cereal captain crunch, cheerios, gold apolinar with 1% milk, continue, 11/7 met/not met, continues to consume breakfast, 1 egg/sometimes bread, coffee, water, has had challenges with following My plate for meals, continue by f/u, 3/12 met, continue by f/u-7/17- improving, consuming breakfast, started adding higher fiber cereal frosted shredded wheat, but has been using whole milk, suggested low fat milk, and consumes egg, " for lunch has been adding carrots with 1 slice of pizza, at dinner has wings, ribs, pork chops, potatoes, sometimes fruit -continue by f/u, 12/18 improving, aims for consuming balanced breakfast which includes 1 eggs, honey nut cheerios with 2% or skim milk, coffee at lunch was eating carrots with 1 slice of pizza, or chicken adriel salad at [employer] or nuggets and small fries from [employer], at dinner would have meat and mashed potatoes or rice, beans, discussed limiting fast food eating and encouraged reading food labels, continue by f/u   2. Replace high salty snacks with fresh fruit or vegetables or lower sodium snacks by f/u-4/12 improving has been consuming low sodium crackers, has been cutting back on doritos, pringles, fritos and looking at labels more, continue, 8/2 met continues to use low sodium, or no salt add crackers, but not consuming much of fruits/vegetables as snack, still eats corn chips, doritos but has decreased amount, continue f/u, 11/7 not met, continue by f/u, 3/12- met continue by f/u but replaced with welches fruit snack, provided other healthier snack ideas continue by f/u -7/17, has cut back on salty snacks but looking to improve fruit/veggie intake, has been eating crackers, suggested to look for low Na crackers, continue by f/u, 12/18 improving, continue by f/u    3. Aim for 2-4 lb weight loss x 1 month by f/u- 4/12 met, lost 8lbs/ from last month, continue to lose weight to a normal BMI range by f/u, 8/2 not met, weight increased in May but then back down overall lost 3lbs in 4 month, continue by f/u, 11/7 not met, continue by f/u, 7/17 not met, continue by f/u-12/18 not met, continue by f/u      4. Participate in physical activity at least 30-60 mins 3x week by f/u- 8/2 not met has been walking 1 lap around a park and then plays basketball for about 30-45 mins 1 x week, continue by f/u, 11/7 met, continue by f/u, 3/12 not met, but improving, has gone to [facility], walks on  the indoor track but hasn't been consistently going, continue by f/u, 7/17 not met, only walks 1-2x week,  continue by f/u, 12/18 not met, continue by f/u   5. Limit to 2000mg of Na a day, limit processed meats, frozen pizza, canned beans and sauces, read food labels to monitor by f/u, 11/7 not met, continue by f/u, 3/12 not met, has been eating frozen meals, fast food, cooks with salt, discussed tips for flavoring food versus salt, continue by f/u 7/17 improving, continue by f/u, 12/18 slightly improving, continue by f/u  New Goals:   6. Decrease saturated fat/cholesterol intake switch from wings to chicken breast or wings without skin, avoid ribs, consider introducing more fish weekly, limit butter consider whipped butter or plant based butter spreads, olive oil or non fat cooking spray, limit whole milk and cheeses switch to low fat dairy by f/u, 12/18 improving, continue by f/u   7. Limit juice intake or other sweetened beverages, limit juice to 1/2 cup daily by f/u          Initial PES:     Food and nutrition related knowledge deficit  related to Lack of prior exposure to accurate nutrition related information as evidenced by Conditions associated with diagnosis or treatment -continue      New PES:       New Problem List:  1.   2.   3.       Assessment:  Vinay presents for nutrition counseling f/u. Vinay is concerned with constipation. Reviewed nutrition therapy diet education for constipation, discussed increasing fiber intake, water intake and physical activity, Reinforced goals with limiting sodium, high fat and consuming balanced meals.    F/u 12/18  Vinay presents for f/u visit. Vinay is concerned about HTN and weight gain. Vinay reported not being consistent with following goals. Reinforced balancing meals, watching portions, limited sweetened beverages and increasing physical activity for overall health. Discussed obtaining new referral, alerted MD via message. Will call to schedule f/u once referral  received.     Medical Nutrition Therapy Intervention:  [x]Individualized Meal Plan- Discussed the plate method of portioning foods, including half a plate fruits and vegetables or a half plate all vegetables, 1/4 of the plate a lean protein source or meat, and a 1/4 of the plate being a whole grain carb- usually 1/2-1c.  This should be followed for at least 2 meals of the day, but could also be followed for all 3. Reviewed nutrition therapy diet education for constipation, discussed tips to increase fiber, water intake and physical activity. Reviewed tips to cut back on saturated fat and cholesterol. Discussed limiting sweetened beverages. [x]Understanding Lab Values- 4/24/24 cholesterol 216,    []Basic Pathophysiology of Disease []Food/Medication Interactions   []Food Diary [x]Exercise- 150 mins of moderate activity weekly, discussed importance of variety of activity, including wt bearing activities 2-3x/wk x 10-15mins. Discussed importance of activity throughout the lifecycle and its impact on overall health/stress management, etc.    []Lifestyle/Behavior Modification Techniques []Medication, Mechanism of Action   []Label Reading []Self Blood Glucose Monitoring   []Weight/BMI Goals []Other -    Other Notes:        Comprehension: []Excellent  []Very Good  []Good  [x]Fair   []Poor    Receptivity: []Excellent  []Very Good  [x]Good  []Fair   []Poor    Expected Compliance: []Excellent  []Very Good  []Good  [x]Fair   []Poor      Labs:  CMP  Lab Results   Component Value Date     08/01/2015    K 4.1 04/24/2024     04/24/2024    CO2 28 04/24/2024    ANIONGAP 7 08/01/2015    BUN 18 04/24/2024    CREATININE 1.25 04/24/2024    GLUCOSE 87 08/01/2015    GLUF 77 04/24/2024    CALCIUM 9.4 04/24/2024    AST 24 04/24/2024    ALT 22 04/24/2024    ALKPHOS 52 04/24/2024    EGFR 73 04/24/2024       BMP  Lab Results   Component Value Date    GLUCOSE 87 08/01/2015    CALCIUM 9.4 04/24/2024     08/01/2015    K  "4.1 04/24/2024    CO2 28 04/24/2024     04/24/2024    BUN 18 04/24/2024    CREATININE 1.25 04/24/2024       Lipids  Lab Results   Component Value Date    CHOL 197 09/24/2015     Lab Results   Component Value Date    HDL 41 04/24/2024    HDL 41 02/24/2019    HDL 37 (L) 07/31/2016     Lab Results   Component Value Date    LDLCALC 146 (H) 04/24/2024    LDLCALC 142 (H) 02/24/2019    LDLCALC 123 (H) 07/31/2016     Lab Results   Component Value Date    TRIG 143 04/24/2024    TRIG 115 02/24/2019    TRIG 96 07/31/2016     No results found for: \"CHOLHDL\"    Hemoglobin A1C  No results found for: \"HGBA1C\"    Fasting Glucose  Lab Results   Component Value Date    GLUF 77 04/24/2024       Insulin     Thyroid  Lab Results   Component Value Date    TSH 1.75 02/21/2022       Hepatic Function Panel  Lab Results   Component Value Date    ALT 22 04/24/2024    AST 24 04/24/2024    ALKPHOS 52 04/24/2024       Celiac Disease Antibody Panel  No results found for: \"ENDOMYSIAL IGA\", \"GLIADIN IGA\", \"GLIADIN IGG\", \"IGA\", \"TISSUE TRANSGLUT AB\", \"TTG IGA\"   Iron  No results found for: \"IRON\", \"TIBC\", \"FERRITIN\"    Vitamins  No results found for: \"VITAMIN B2\"   No results found for: \"NICOTINAMIDE\", \"NICOTINIC ACID\"   No results found for: \"VITAMINB6\"  No results found for: \"UIXPAFJS91\"  No results found for: \"VITB5\"  No results found for: \"N6LAFTTV\"  No results found for: \"THYROGLB\"  No results found for: \"VITAMIN K\"   No results found for: \"25-HYDROXY VIT D\"   No components found for: \"VITAMINE\"     No follow-ups on file.    Bj Byrne RD  [facility] CLINICAL NUTRITION [facility]  [address] [place] [phone]  "

## 2024-12-23 ENCOUNTER — APPOINTMENT (EMERGENCY)
Dept: ULTRASOUND IMAGING | Facility: HOSPITAL | Age: 39
End: 2024-12-23
Payer: COMMERCIAL

## 2024-12-23 ENCOUNTER — HOSPITAL ENCOUNTER (EMERGENCY)
Facility: HOSPITAL | Age: 39
Discharge: HOME | End: 2024-12-23
Attending: EMERGENCY MEDICINE
Payer: COMMERCIAL

## 2024-12-23 ENCOUNTER — APPOINTMENT (EMERGENCY)
Dept: CT IMAGING | Facility: HOSPITAL | Age: 39
End: 2024-12-23
Payer: COMMERCIAL

## 2024-12-23 VITALS
DIASTOLIC BLOOD PRESSURE: 67 MMHG | BODY MASS INDEX: 29.69 KG/M2 | SYSTOLIC BLOOD PRESSURE: 105 MMHG | TEMPERATURE: 98.8 F | HEART RATE: 106 BPM | WEIGHT: 189.6 LBS | OXYGEN SATURATION: 99 % | RESPIRATION RATE: 18 BRPM

## 2024-12-23 DIAGNOSIS — K52.9 GASTROENTERITIS: Primary | ICD-10-CM

## 2024-12-23 DIAGNOSIS — D72.825 BANDEMIA: ICD-10-CM

## 2024-12-23 LAB
ALBUMIN SERPL BCG-MCNC: 5.2 G/DL (ref 3.5–5)
ALP SERPL-CCNC: 64 U/L (ref 34–104)
ALT SERPL W P-5'-P-CCNC: 18 U/L (ref 7–52)
ANION GAP SERPL CALCULATED.3IONS-SCNC: 8 MMOL/L (ref 4–13)
AST SERPL W P-5'-P-CCNC: 23 U/L (ref 13–39)
BACTERIA UR QL AUTO: NORMAL /HPF
BASOPHILS # BLD MANUAL: 0 THOUSAND/UL (ref 0–0.1)
BASOPHILS NFR MAR MANUAL: 0 % (ref 0–1)
BILIRUB SERPL-MCNC: 0.6 MG/DL (ref 0.2–1)
BILIRUB UR QL STRIP: NEGATIVE
BUN SERPL-MCNC: 26 MG/DL (ref 5–25)
CALCIUM SERPL-MCNC: 9.7 MG/DL (ref 8.4–10.2)
CHLORIDE SERPL-SCNC: 102 MMOL/L (ref 96–108)
CLARITY UR: CLEAR
CO2 SERPL-SCNC: 27 MMOL/L (ref 21–32)
COLOR UR: YELLOW
CREAT SERPL-MCNC: 1.26 MG/DL (ref 0.6–1.3)
EOSINOPHIL # BLD MANUAL: 0.14 THOUSAND/UL (ref 0–0.4)
EOSINOPHIL NFR BLD MANUAL: 1 % (ref 0–6)
ERYTHROCYTE [DISTWIDTH] IN BLOOD BY AUTOMATED COUNT: 13 % (ref 11.6–15.1)
GFR SERPL CREATININE-BSD FRML MDRD: 72 ML/MIN/1.73SQ M
GLUCOSE SERPL-MCNC: 107 MG/DL (ref 65–140)
GLUCOSE UR STRIP-MCNC: NEGATIVE MG/DL
HCT VFR BLD AUTO: 47.9 % (ref 36.5–49.3)
HGB BLD-MCNC: 16 G/DL (ref 12–17)
HGB UR QL STRIP.AUTO: NEGATIVE
INR PPP: 0.91 (ref 0.85–1.19)
KETONES UR STRIP-MCNC: NEGATIVE MG/DL
LACTATE SERPL-SCNC: 0.9 MMOL/L (ref 0.5–2)
LEUKOCYTE ESTERASE UR QL STRIP: NEGATIVE
LIPASE SERPL-CCNC: 26 U/L (ref 11–82)
LYMPHOCYTES # BLD AUTO: 0.14 THOUSAND/UL (ref 0.6–4.47)
LYMPHOCYTES # BLD AUTO: 1 % (ref 14–44)
MAGNESIUM SERPL-MCNC: 1.9 MG/DL (ref 1.9–2.7)
MCH RBC QN AUTO: 30.4 PG (ref 26.8–34.3)
MCHC RBC AUTO-ENTMCNC: 33.4 G/DL (ref 31.4–37.4)
MCV RBC AUTO: 91 FL (ref 82–98)
MONOCYTES # BLD AUTO: 0.55 THOUSAND/UL (ref 0–1.22)
MONOCYTES NFR BLD: 4 % (ref 4–12)
NEUTROPHILS # BLD MANUAL: 12.93 THOUSAND/UL (ref 1.85–7.62)
NEUTS BAND NFR BLD MANUAL: 15 % (ref 0–8)
NEUTS SEG NFR BLD AUTO: 79 % (ref 43–75)
NITRITE UR QL STRIP: NEGATIVE
NON-SQ EPI CELLS URNS QL MICRO: NORMAL /HPF
PH UR STRIP.AUTO: 6 [PH]
PLATELET # BLD AUTO: 283 THOUSANDS/UL (ref 149–390)
PLATELET BLD QL SMEAR: ADEQUATE
PMV BLD AUTO: 10.3 FL (ref 8.9–12.7)
POTASSIUM SERPL-SCNC: 4.1 MMOL/L (ref 3.5–5.3)
PROCALCITONIN SERPL-MCNC: 0.36 NG/ML
PROT SERPL-MCNC: 8.2 G/DL (ref 6.4–8.4)
PROT UR STRIP-MCNC: ABNORMAL MG/DL
PROTHROMBIN TIME: 12.9 SECONDS (ref 12.3–15)
RBC # BLD AUTO: 5.27 MILLION/UL (ref 3.88–5.62)
RBC #/AREA URNS AUTO: NORMAL /HPF
RBC MORPH BLD: NORMAL
SODIUM SERPL-SCNC: 137 MMOL/L (ref 135–147)
SP GR UR STRIP.AUTO: 1.03 (ref 1–1.03)
UROBILINOGEN UR STRIP-ACNC: <2 MG/DL
WBC # BLD AUTO: 13.76 THOUSAND/UL (ref 4.31–10.16)
WBC #/AREA URNS AUTO: NORMAL /HPF

## 2024-12-23 PROCEDURE — 96375 TX/PRO/DX INJ NEW DRUG ADDON: CPT

## 2024-12-23 PROCEDURE — 81001 URINALYSIS AUTO W/SCOPE: CPT

## 2024-12-23 PROCEDURE — 96361 HYDRATE IV INFUSION ADD-ON: CPT

## 2024-12-23 PROCEDURE — 80053 COMPREHEN METABOLIC PANEL: CPT

## 2024-12-23 PROCEDURE — 84145 PROCALCITONIN (PCT): CPT | Performed by: EMERGENCY MEDICINE

## 2024-12-23 PROCEDURE — 87591 N.GONORRHOEAE DNA AMP PROB: CPT | Performed by: EMERGENCY MEDICINE

## 2024-12-23 PROCEDURE — 99285 EMERGENCY DEPT VISIT HI MDM: CPT | Performed by: EMERGENCY MEDICINE

## 2024-12-23 PROCEDURE — 83690 ASSAY OF LIPASE: CPT

## 2024-12-23 PROCEDURE — 85007 BL SMEAR W/DIFF WBC COUNT: CPT

## 2024-12-23 PROCEDURE — 99284 EMERGENCY DEPT VISIT MOD MDM: CPT

## 2024-12-23 PROCEDURE — 96365 THER/PROPH/DIAG IV INF INIT: CPT

## 2024-12-23 PROCEDURE — 85027 COMPLETE CBC AUTOMATED: CPT

## 2024-12-23 PROCEDURE — 83605 ASSAY OF LACTIC ACID: CPT | Performed by: EMERGENCY MEDICINE

## 2024-12-23 PROCEDURE — 87040 BLOOD CULTURE FOR BACTERIA: CPT | Performed by: EMERGENCY MEDICINE

## 2024-12-23 PROCEDURE — 85610 PROTHROMBIN TIME: CPT | Performed by: EMERGENCY MEDICINE

## 2024-12-23 PROCEDURE — 74177 CT ABD & PELVIS W/CONTRAST: CPT

## 2024-12-23 PROCEDURE — 76870 US EXAM SCROTUM: CPT

## 2024-12-23 PROCEDURE — 87491 CHLMYD TRACH DNA AMP PROBE: CPT | Performed by: EMERGENCY MEDICINE

## 2024-12-23 PROCEDURE — 36415 COLL VENOUS BLD VENIPUNCTURE: CPT

## 2024-12-23 PROCEDURE — 83735 ASSAY OF MAGNESIUM: CPT

## 2024-12-23 RX ORDER — ONDANSETRON 4 MG/1
4 TABLET, ORALLY DISINTEGRATING ORAL EVERY 6 HOURS PRN
Qty: 10 TABLET | Refills: 0 | Status: SHIPPED | OUTPATIENT
Start: 2024-12-23

## 2024-12-23 RX ORDER — ONDANSETRON 2 MG/ML
4 INJECTION INTRAMUSCULAR; INTRAVENOUS ONCE
Status: COMPLETED | OUTPATIENT
Start: 2024-12-23 | End: 2024-12-23

## 2024-12-23 RX ADMIN — IOHEXOL 70 ML: 350 INJECTION, SOLUTION INTRAVENOUS at 10:53

## 2024-12-23 RX ADMIN — DEXTROSE 1000 MG: 50 INJECTION, SOLUTION INTRAVENOUS at 11:11

## 2024-12-23 RX ADMIN — SODIUM CHLORIDE 1000 ML: 0.9 INJECTION, SOLUTION INTRAVENOUS at 09:26

## 2024-12-23 RX ADMIN — ONDANSETRON 4 MG: 2 INJECTION INTRAMUSCULAR; INTRAVENOUS at 09:26

## 2024-12-23 NOTE — DISCHARGE INSTRUCTIONS
You may use Zofran as needed for management of your nausea and vomiting.    Please ensure you are drinking plenty of fluids to prevent dehydration.    Follow-up with your family doctor if symptoms persist more than 2 to 3 days.    Thank you for allowing us take part in your care.

## 2024-12-23 NOTE — ED NOTES
Pt ambulatory to restroom with urine specimen collection cup and cleansing wipe. Pt verbalizes understanding of clean catch technique     Ritika Cummins RN  12/23/24 [id number]

## 2024-12-23 NOTE — ED ATTENDING ATTESTATION
12/23/2024  I, Traci Cooper MD, saw and evaluated the patient. I have discussed the patient with the resident/non-physician practitioner and agree with the resident's/non-physician practitioner's findings, Plan of Care, and MDM as documented in the resident's/non-physician practitioner's note, except where noted. All available labs and Radiology studies were reviewed.  I was present for key portions of any procedure(s) performed by the resident/non-physician practitioner and I was immediately available to provide assistance.       At this point I agree with the current assessment done in the Emergency Department.  I have conducted an independent evaluation of this patient a history and physical is as follows:    38-year-old male with history of congenital right UPJ obstruction with hydronephrosis presenting for evaluation of nausea, vomiting, and diarrhea that started at around 3 AM after eating an unknown type of meat at a [employer] buffet yesterday at around 6 pm.  Patient reports generalized abdominal discomfort worse to the bilateral flanks as well as dysuria and pain in his testicles.  No chest pain or difficulty breathing.  All of his symptoms started at around 3 AM when he began vomiting.  Reports 3 episodes of nonbloody nonbilious emesis and 3 episodes of watery nonbloody diarrhea.  No fevers or chills.    Physical Exam  Constitutional:       General: He is not in acute distress.     Appearance: He is well-developed. He is not ill-appearing, toxic-appearing or diaphoretic.   HENT:      Head: Normocephalic and atraumatic.      Right Ear: External ear normal.      Left Ear: External ear normal.      Nose: Nose normal.   Eyes:      Conjunctiva/sclera: Conjunctivae normal.   Cardiovascular:      Rate and Rhythm: Regular rhythm. Tachycardia present.      Pulses: Normal pulses.      Heart sounds: Normal heart sounds. No murmur heard.     No friction rub. No gallop.   Pulmonary:      Effort: Pulmonary effort is  normal. No respiratory distress.      Breath sounds: Normal breath sounds. No wheezing or rales.   Abdominal:      General: Bowel sounds are normal. There is no distension.      Palpations: Abdomen is soft.      Tenderness: There is abdominal tenderness (generalized to deep palpation, worst in the LLQ). There is no right CVA tenderness, left CVA tenderness or guarding.   Genitourinary:     Comments: Normal circumcised penis. Normal testicular size and position. No scrotal edema or erythema.  Patient reports diffuse tenderness to his bilateral testicles, no specific tenderness over the posterior aspect.  No palpable inguinal or femoral hernias.  Musculoskeletal:         General: No deformity. Normal range of motion.      Cervical back: Normal range of motion and neck supple.      Right lower leg: No edema.      Left lower leg: No edema.   Skin:     General: Skin is warm and dry.   Neurological:      Mental Status: He is alert and oriented to person, place, and time.      Motor: No abnormal muscle tone.           ED Course  ED Course as of 12/23/24 1646   Mon Dec 23, 2024   1021 Patient meets SIRS criteria with leukocytosis to 13.76, tachycardia, bands elevated to 15%.  He has diffuse tenderness to palpation of the abdomen though is distractible.  Will obtain CT scan of the abdomen pelvis for further evaluation.  Also reporting bilateral testicular pain that he states started with his vomiting.  Testicular exam with diffuse tenderness reported to the bilateral testicles without abnormal swelling, presence of inguinal hernia.  Normal testicular lie.  No penile discharge noted.  Will obtain testicular ultrasound.  Patient denies that he is currently sexually active or history of STIs but will add on GC testing to his urine.   1110 Ultrasound of the scrotum and testes is normal.  UA without evidence of urinary tract infection.   1234 No lactic acidosis, mild elevation of procalcitonin to 0.36.  UA without evidence of  urinary tract infection.  CT scan of the abdomen pelvis with contrast shows nonspecific gastroenteritis with chronic obstruction at the right UPJ, no stone.  Ultrasound of the scrotum and testicles normal.   1236 Since of clear bacterial infectious source on workup suspect that patient's bandemia is in the setting of vomiting from likely food poisoning as symptoms started after eating an unknown meat product at a [employer] buffet.  Will trial oral intake.  If patient is able to tolerate oral intake, will discharge with prescription for antiemetics.  If unable to tolerate oral intake will need to be admitted for observation.         Critical Care Time  Procedures

## 2024-12-23 NOTE — ED PROVIDER NOTES
Time reflects when diagnosis was documented in both MDM as applicable and the Disposition within this note       Time User Action Codes Description Comment    12/23/2024 12:56 PM Doug, Cesar VILLEDA Add [K52.9] Gastroenteritis     12/23/2024 12:57 PM Doug, Cesar VILLEDA Add [D72.825] Bandemia           ED Disposition       ED Disposition   Discharge    Condition   Stable    Date/Time   Mon Dec 23, 2024  1:04 PM    Comment   Vinay Breen discharge to home/self care.                   Assessment & Plan       Medical Decision Making  38-year-old male history of CKD presenting due to nausea, vomiting, diarrhea.  Patient states he ate at a Chinese buffet last night by himself and this morning woke up with 3 episodes of vomiting and diarrhea.  Nonbloody nonbilious vomiting, normal colored stool.  Patient has diffuse abdominal pain.  Patient does endorse that prior to this he has been having some dysuria and just not feeling well overall.  Denies any respiratory symptoms, chest pain, shortness of breath.  Unable to tolerate anything p.o. currently.    On evaluation, patient is diffusely tender in his abdomen with bilateral CVA tenderness the patient states his slightly greater than normal.  Plan for laboratory evaluation and IV fluids due to p.o. intolerance.    Amount and/or Complexity of Data Reviewed  Labs: ordered. Decision-making details documented in ED Course.  Radiology: ordered. Decision-making details documented in ED Course.    Risk  Prescription drug management.        ED Course as of 12/23/24 1659   Mon Dec 23, 2024   0902 Chinese buffet last night by himself, ate chicken wings, ribs, mystery meat?, today NVD x 3. Headache and s/t, normal colored stool, NBNB vomit, abd pain    No F/Ch/CP/SOB    Hx CKD   0930 Blood Pressure: 132/89   0930 Temperature: 98.8 °F (37.1 °C)   0930 Pulse: 105   0930 Respirations: 18   0930 SpO2: 99 %   0934 WBC(!): 13.76   1027 Bands %(!): 15  Bandemia concerning for infection with  tachycardia, has 2 SIRS criteria, unknown source of infection.  Will give empiric dose of Rocephin and follow-up with septic labs and CT abdomen pelvis.   1032 Patient now endorsing testicular pain and tenderness.  Will evaluate with scrotal ultrasound, testicles tender on physical exam but otherwise appear unremarkable.   1058 US scrotum and testicles  Normal exam.        1208 LACTIC ACID: 0.9   1248 CT abdomen pelvis with contrast  Findings suggestive of nonspecific gastroenteritis.     Chronic right UPJ obstruction.     1248 Discussed findings with patient of nonspecific gastroenteritis on CT scan.  Unremarkable scrotal ultrasound.  Normal lactate.  Patient likely having gastroenteritis from food he ate last night.  On reassessment patient still has diffuse abdominal discomfort and has had diarrhea since being in the emergency department but no continued vomiting at this point.  Will p.o. challenge and discussed concern with elevated white cell count and bands which may be due to his gastroenteritis but he should also monitor for any new or worsening symptoms.  If patient able to tolerate p.o., plan to discharge with Zofran and family doctor follow-up.   1304 Patient tolerated apolinar crackers and water without complication.  Return precaution discussed, Zofran sent to pharmacy, patient is agreeable and appropriate for discharge at this time.       Medications   ondansetron (ZOFRAN) injection 4 mg (4 mg Intravenous Given 12/23/24 0926)   sodium chloride 0.9 % bolus 1,000 mL (0 mL Intravenous Stopped 12/23/24 1026)   ceftriaxone (ROCEPHIN) 1 g/50 mL in dextrose IVPB (0 mg Intravenous Stopped 12/23/24 1141)   iohexol (OMNIPAQUE) 350 MG/ML injection (MULTI-DOSE) 70 mL (70 mL Intravenous Given 12/23/24 1053)       ED Risk Strat Scores                          SBIRT 20yo+      Flowsheet Row Most Recent Value   Initial Alcohol Screen: US AUDIT-C     1. How often do you have a drink containing alcohol? 0 Filed at:  12/23/2024 0855   2. How many drinks containing alcohol do you have on a typical day you are drinking?  0 Filed at: 12/23/2024 0855   3a. Male UNDER 65: How often do you have five or more drinks on one occasion? 0 Filed at: 12/23/2024 0855   Audit-C Score 0 Filed at: 12/23/2024 0855   RONNI: How many times in the past year have you...    Used an illegal drug or used a prescription medication for non-medical reasons? Never Filed at: 12/23/2024 0855                            History of Present Illness       Chief Complaint   Patient presents with    Abdominal Pain     Vomiting, diarrhea, abdominal pain this morning. Pt also c/o sore throat.        Past Medical History:   Diagnosis Date    Renal failure       Past Surgical History:   Procedure Laterality Date    CYSTOSCOPY  05/21/2024    Dr. Palacios    TONSILLECTOMY AND ADENOIDECTOMY        Family History   Problem Relation Age of Onset    Stroke Father     Diabetes Father     Kidney disease Paternal Aunt     Kidney disease Paternal Uncle     Kidney disease Paternal Grandfather     Cancer Maternal Grandmother     Heart disease Maternal Grandmother       Social History     Tobacco Use    Smoking status: Never    Smokeless tobacco: Never   Vaping Use    Vaping status: Never Used   Substance Use Topics    Alcohol use: No    Drug use: No      E-Cigarette/Vaping    E-Cigarette Use Never User       E-Cigarette/Vaping Substances      I have reviewed and agree with the history as documented.     38-year-old male history of CKD presenting due to nausea, vomiting, diarrhea.  Patient states he ate at a Chinese buffet last night by himself and this morning woke up with 3 episodes of vomiting and diarrhea.  Nonbloody nonbilious vomiting, normal colored stool.  Patient has diffuse abdominal pain.  Patient does endorse that prior to this he has been having some dysuria and just not feeling well overall.  Denies any respiratory symptoms, chest pain, shortness of breath.  Unable to  tolerate anything p.o. currently.        Review of Systems   Constitutional:  Negative for chills and fever.   Gastrointestinal:  Positive for abdominal pain, diarrhea, nausea and vomiting.   Genitourinary:  Positive for dysuria, penile discharge, scrotal swelling and testicular pain. Negative for flank pain.           Objective       ED Triage Vitals   Temperature Pulse Blood Pressure Respirations SpO2 Patient Position - Orthostatic VS   12/23/24 0850 12/23/24 0850 12/23/24 0850 12/23/24 0850 12/23/24 0850 12/23/24 1100   98.8 °F (37.1 °C) 105 132/89 18 99 % Lying      Temp src Heart Rate Source BP Location FiO2 (%) Pain Score    -- 12/23/24 1100 12/23/24 1100 -- 12/23/24 1008     Monitor Right arm  6      Vitals      Date and Time Temp Pulse SpO2 Resp BP Pain Score FACES Pain Rating User   12/23/24 1300 -- 106 99 % 18 105/67 -- -- AG   12/23/24 1245 -- 105 99 % 18 100/63 -- -- AG   12/23/24 1230 -- 101 98 % 18 108/64 -- -- AG   12/23/24 1215 -- 97 97 % 18 109/66 -- -- AG   12/23/24 1145 -- 103 100 % 18 129/66 -- -- AG   12/23/24 1130 -- 105 99 % 18 112/66 -- -- AG   12/23/24 1115 -- 98 100 % 18 118/66 -- -- AG   12/23/24 1100 -- 100 100 % 18 118/66 -- -- AG   12/23/24 1008 -- -- -- -- -- 6 --    12/23/24 0850 98.8 °F (37.1 °C) 105 99 % 18 132/89 -- -- MD            Physical Exam  Vitals and nursing note reviewed.   Constitutional:       General: He is not in acute distress.     Appearance: He is well-developed.   HENT:      Head: Normocephalic and atraumatic.      Nose: Nose normal. No congestion.      Mouth/Throat:      Pharynx: Oropharynx is clear.   Eyes:      Extraocular Movements: Extraocular movements intact.      Conjunctiva/sclera: Conjunctivae normal.   Cardiovascular:      Rate and Rhythm: Regular rhythm. Tachycardia present.      Pulses: Normal pulses.      Heart sounds: Normal heart sounds. No murmur heard.  Pulmonary:      Effort: Pulmonary effort is normal. No respiratory distress.      Breath  sounds: Normal breath sounds.   Chest:      Chest wall: No tenderness.   Abdominal:      General: Abdomen is flat. Bowel sounds are normal. There is no distension.      Palpations: Abdomen is soft.      Tenderness: There is generalized abdominal tenderness. There is right CVA tenderness and left CVA tenderness.   Genitourinary:     Penis: Normal.       Testes: Normal.   Musculoskeletal:         General: No deformity or signs of injury. Normal range of motion.      Cervical back: Normal range of motion and neck supple. No rigidity or tenderness.   Skin:     General: Skin is warm and dry.      Findings: No bruising, lesion or rash.   Neurological:      General: No focal deficit present.      Mental Status: He is alert.      Cranial Nerves: No cranial nerve deficit.      Sensory: No sensory deficit.         Results Reviewed       Procedure Component Value Units Date/Time    Blood culture #1 [[id number]] Collected: 12/23/24 1041    Lab Status: Preliminary result Specimen: Blood from Arm, Right Updated: 12/23/24 1501     Blood Culture Received in Microbiology Lab. Culture in Progress.    Procalcitonin [[id number]]  (Abnormal) Collected: 12/23/24 0913    Lab Status: Final result Specimen: Blood from Arm, Right Updated: 12/23/24 1155     Procalcitonin 0.36 ng/ml     Lactic acid, plasma (w/reflex if result > 2.0) [[id number]]  (Normal) Collected: 12/23/24 1111    Lab Status: Final result Specimen: Blood from Arm, Left Updated: 12/23/24 1137     LACTIC ACID 0.9 mmol/L     Narrative:      Result may be elevated if tourniquet was used during collection.    Blood culture #2 [[id number]] Collected: 12/23/24 1111    Lab Status: In process Specimen: Blood from Arm, Left Updated: 12/23/24 1119    Protime-INR [[id number]]  (Normal) Collected: 12/23/24 1041    Lab Status: Final result Specimen: Blood from Arm, Right Updated: 12/23/24 1118     Protime 12.9 seconds      INR 0.91    Narrative:      INR Therapeutic Range    Indication                                              INR Range      Atrial Fibrillation                                               2.0-3.0  Hypercoagulable State                                    2.0.2.3  Left Ventricular Asist Device                            2.0-3.0  Mechanical Heart Valve                                  -    Aortic(with afib, MI, embolism, HF, LA enlargement,    and/or coagulopathy)                                     2.0-3.0 (2.5-3.5)     Mitral                                                             2.5-3.5  Prosthetic/Bioprosthetic Heart Valve               2.0-3.0  Venous thromboembolism (VTE: VT, PE        2.0-3.0    Chlamydia/GC amplified DNA by PCR [[id number]] Collected: 12/23/24 1006    Lab Status: In process Specimen: Urine, Other Updated: 12/23/24 1012    RBC Morphology Reflex Test [[id number]] Collected: 12/23/24 0913    Lab Status: Final result Specimen: Blood from Arm, Right Updated: 12/23/24 1001    CBC and differential [[id number]]  (Abnormal) Collected: 12/23/24 0913    Lab Status: Final result Specimen: Blood from Arm, Right Updated: 12/23/24 0947     WBC 13.76 Thousand/uL      RBC 5.27 Million/uL      Hemoglobin 16.0 g/dL      Hematocrit 47.9 %      MCV 91 fL      MCH 30.4 pg      MCHC 33.4 g/dL      RDW 13.0 %      MPV 10.3 fL      Platelets 283 Thousands/uL     Narrative:      This is an appended report.  These results have been appended to a previously verified report.    Manual Differential(PHLEBS Do Not Order) [[id number]]  (Abnormal) Collected: 12/23/24 0913    Lab Status: Final result Specimen: Blood from Arm, Right Updated: 12/23/24 0947     Segmented % 79 %      Bands % 15 %      Lymphocytes % 1 %      Monocytes % 4 %      Eosinophils % 1 %      Basophils % 0 %      Absolute Neutrophils 12.93 Thousand/uL      Absolute Lymphocytes 0.14 Thousand/uL      Absolute Monocytes 0.55 Thousand/uL      Absolute Eosinophils 0.14 Thousand/uL      Absolute Basophils 0.00 Thousand/uL      Total  Counted --     RBC Morphology Normal     Platelet Estimate Adequate    Urine Microscopic [[id number]]  (Normal) Collected: 12/23/24 0925    Lab Status: Final result Specimen: Urine, Clean Catch Updated: 12/23/24 0944     RBC, UA 1-2 /hpf      WBC, UA None Seen /hpf      Epithelial Cells None Seen /hpf      Bacteria, UA None Seen /hpf     Comprehensive metabolic panel [[id number]]  (Abnormal) Collected: 12/23/24 0913    Lab Status: Final result Specimen: Blood from Arm, Right Updated: 12/23/24 0942     Sodium 137 mmol/L      Potassium 4.1 mmol/L      Chloride 102 mmol/L      CO2 27 mmol/L      ANION GAP 8 mmol/L      BUN 26 mg/dL      Creatinine 1.26 mg/dL      Glucose 107 mg/dL      Calcium 9.7 mg/dL      AST 23 U/L      ALT 18 U/L      Alkaline Phosphatase 64 U/L      Total Protein 8.2 g/dL      Albumin 5.2 g/dL      Total Bilirubin 0.60 mg/dL      eGFR 72 ml/min/1.73sq m     Narrative:      National Kid[employer] Foundation guidelines for Chronic Kidney Disease (CKD):     Stage 1 with normal or high GFR (GFR > 90 mL/min/1.73 square meters)    Stage 2 Mild CKD (GFR = 60-89 mL/min/1.73 square meters)    Stage 3A Moderate CKD (GFR = 45-59 mL/min/1.73 square meters)    Stage 3B Moderate CKD (GFR = 30-44 mL/min/1.73 square meters)    Stage 4 Severe CKD (GFR = 15-29 mL/min/1.73 square meters)    Stage 5 End Stage CKD (GFR <15 mL/min/1.73 square meters)  Note: GFR calculation is accurate only with a steady state creatinine    Magnesium [[id number]]  (Normal) Collected: 12/23/24 0913    Lab Status: Final result Specimen: Blood from Arm, Right Updated: 12/23/24 0942     Magnesium 1.9 mg/dL     Lipase [[id number]]  (Normal) Collected: 12/23/24 0913    Lab Status: Final result Specimen: Blood from Arm, Right Updated: 12/23/24 0942     Lipase 26 u/L     UA w Reflex to Microscopic w Reflex to Culture [[id number]]  (Abnormal) Collected: 12/23/24 0925    Lab Status: Final result Specimen: Urine, Clean Catch Updated: 12/23/24 0942      Color, UA Yellow     Clarity, UA Clear     Specific Gravity, UA 1.030     pH, UA 6.0     Leukocytes, UA Negative     Nitrite, UA Negative     Protein, UA 50 (1+) mg/dl      Glucose, UA Negative mg/dl      Ketones, UA Negative mg/dl      Urobilinogen, UA <2.0 mg/dl      Bilirubin, UA Negative     Occult Blood, UA Negative            CT abdomen pelvis with contrast   Final Interpretation by Eliezer Jose MD (12/23 1150)      Findings suggestive of nonspecific gastroenteritis.      Chronic right UPJ obstruction.         Workstation performed: [id number]         US scrotum and testicles   Final Interpretation by Colin Hamlin MD (12/23 9396)      Normal exam.      Workstation performed: [id number]             Procedures    ED Medication and Procedure Management   Prior to Admission Medications   Prescriptions Last Dose Informant Patient Reported? Taking?   Ascorbic Acid (vitamin C) 100 MG tablet  Self Yes No   Sig: Take 100 mg by mouth daily   Cholecalciferol (VITAMIN D PO)  Self Yes No   Sig: Take 5,000 Units by mouth daily    Mirabegron ER 25 MG TB24  Self No No   Sig: Take 25 mg by mouth in the morning   Patient not taking: Reported on 12/9/2024   acetaminophen (TYLENOL) 325 mg tablet  Self Yes No   Sig: Take 650 mg by mouth every 6 (six) hours as needed for mild pain   albuterol (PROVENTIL HFA,VENTOLIN HFA) 90 mcg/act inhaler  Self Yes No   Sig: Inhale 2 puffs every 6 (six) hours as needed for wheezing   cholecalciferol (VITAMIN D3) 1,000 units tablet  Self Yes No   Sig: Take 2,000 Units by mouth daily   Patient not taking: Reported on 11/14/2023   cyclobenzaprine (FLEXERIL) 5 mg tablet  Self No No   Sig: Take 1 tablet (5 mg total) by mouth 3 (three) times a day as needed for muscle spasms for up to 9 doses   Patient not taking: Reported on 10/24/2023   erythromycin (ILOTYCIN) ophthalmic ointment  Self Yes No   Sig: Apply 1/2 inch ribbon to both lower eyelids three times a day for 7 days. Use after  cleansing and warm compress.   fluticasone (FLONASE) 50 mcg/act nasal spray   Yes No   Si sprays into each nostril daily    Patient not taking: Reported on 2021   predniSONE 10 mg tablet  Self Yes No   Sig: if needed   Patient not taking: Reported on 2022   prednisoLONE acetate (PRED FORTE) 1 % ophthalmic suspension  Self Yes No   Sig: PLEASE SEE ATTACHED FOR DETAILED DIRECTIONS   tamsulosin (FLOMAX) 0.4 mg  Self Yes No   Sig: Take 0.4 mg by mouth daily with dinner   Patient not taking: Reported on 2024   trospium chloride (SANCTURA) 20 mg tablet  Self No No   Sig: Take 1 tablet (20 mg total) by mouth 2 (two) times a day   Patient not taking: Reported on 2024   vitamin B-12 (VITAMIN B-12) 500 mcg tablet  Self Yes No   Sig: Take 500 mcg by mouth daily      Facility-Administered Medications: None     Discharge Medication List as of 2024  1:04 PM        START taking these medications    Details   ondansetron (ZOFRAN-ODT) 4 mg disintegrating tablet Take 1 tablet (4 mg total) by mouth every 6 (six) hours as needed for nausea or vomiting for up to 10 doses, Starting Mon 2024, Normal           CONTINUE these medications which have NOT CHANGED    Details   acetaminophen (TYLENOL) 325 mg tablet Take 650 mg by mouth every 6 (six) hours as needed for mild pain, Historical Med      albuterol (PROVENTIL HFA,VENTOLIN HFA) 90 mcg/act inhaler Inhale 2 puffs every 6 (six) hours as needed for wheezing, Historical Med      Ascorbic Acid (vitamin C) 100 MG tablet Take 100 mg by mouth daily, Historical Med      !! Cholecalciferol (VITAMIN D PO) Take 5,000 Units by mouth daily , Historical Med      !! cholecalciferol (VITAMIN D3) 1,000 units tablet Take 2,000 Units by mouth daily, Historical Med      cyclobenzaprine (FLEXERIL) 5 mg tablet Take 1 tablet (5 mg total) by mouth 3 (three) times a day as needed for muscle spasms for up to 9 doses, Starting Mon 2023, Normal      erythromycin (ILOTYCIN)  ophthalmic ointment Apply 1/2 inch ribbon to both lower eyelids three times a day for 7 days. Use after cleansing and warm compress., Historical Med      fluticasone (FLONASE) 50 mcg/act nasal spray 2 sprays into each nostril daily , Starting Fri 9/21/2018, Until Sat 9/21/2019, Historical Med      Mirabegron ER 25 MG TB24 Take 25 mg by mouth in the morning, Starting Wed 8/7/2024, Normal      prednisoLONE acetate (PRED FORTE) 1 % ophthalmic suspension PLEASE SEE ATTACHED FOR DETAILED DIRECTIONS, Historical Med      predniSONE 10 mg tablet if needed, Starting Sun 8/28/2022, Historical Med      tamsulosin (FLOMAX) 0.4 mg Take 0.4 mg by mouth daily with dinner, Starting Thu 11/2/2023, Until Fri 11/1/2024, Historical Med      trospium chloride (SANCTURA) 20 mg tablet Take 1 tablet (20 mg total) by mouth 2 (two) times a day, Starting Wed 8/7/2024, Normal      vitamin B-12 (VITAMIN B-12) 500 mcg tablet Take 500 mcg by mouth daily, Historical Med       !! - Potential duplicate medications found. Please discuss with provider.        No discharge procedures on file.  ED SEPSIS DOCUMENTATION   Time reflects when diagnosis was documented in both MDM as applicable and the Disposition within this note       Time User Action Codes Description Comment    12/23/2024 12:56 PM Cesar Camacho [K52.9] Gastroenteritis     12/23/2024 12:57 PM Cesar Camacho [D72.825] Sara Camacho MD  12/23/24 0912

## 2024-12-24 LAB
C TRACH DNA SPEC QL NAA+PROBE: NEGATIVE
N GONORRHOEA DNA SPEC QL NAA+PROBE: NEGATIVE

## 2024-12-28 LAB
BACTERIA BLD CULT: NORMAL
BACTERIA BLD CULT: NORMAL

## 2024-12-30 DIAGNOSIS — Q62.39 UPJ OBSTRUCTION, CONGENITAL: ICD-10-CM

## 2024-12-30 DIAGNOSIS — N18.31 STAGE 3A CHRONIC KIDNEY DISEASE (HCC): Primary | ICD-10-CM

## 2024-12-30 DIAGNOSIS — Q62.11 HYDRONEPHROSIS WITH URETEROPELVIC JUNCTION (UPJ) OBSTRUCTION: ICD-10-CM

## 2025-01-08 ENCOUNTER — TELEPHONE (OUTPATIENT)
Dept: NEPHROLOGY | Facility: CLINIC | Age: 40
End: 2025-01-08

## 2025-01-08 NOTE — TELEPHONE ENCOUNTER
IVANNA for patient reminding to go for lab work before his  appt on 1/17 with Dr. Franklin.  Asked them to call back if they have any questions.

## 2025-01-13 LAB
EXTERNAL HIV SCREEN: NORMAL
HBA1C MFR BLD HPLC: 5.5 %
HCV AB SER-ACNC: NORMAL

## 2025-01-13 NOTE — TELEPHONE ENCOUNTER
The pt called back stating he went today for his lab to [address] in [place] [address] for his lab/urine. He is asking if you can call him so he knows when you received the results so he is good to go for his upcoming appt. He stated the facility had our fax number.

## 2025-01-14 ENCOUNTER — TELEPHONE (OUTPATIENT)
Dept: NEPHROLOGY | Facility: CLINIC | Age: 40
End: 2025-01-14

## 2025-01-14 NOTE — TELEPHONE ENCOUNTER
Called patient's Medical Facility [facility] and spoke with Ritika requested to please fax BMP and albumin/ creatinine urine test result at [phone]. Ritika told me that they are wait for the result. When they have labs result, will fax to our office.

## 2025-01-14 NOTE — TELEPHONE ENCOUNTER
Patient is ware and requested to please call him with a update. No further questions at this time.

## 2025-01-16 NOTE — TELEPHONE ENCOUNTER
Called patient's Medical Facility [facility] and spoke with Martha requested to please fax patient's labs result at [phone]. I told her that patient has a appt tomorrow and we need labs result. Martha stated that they will fax labs result today.     Patient is updated.

## 2025-01-17 ENCOUNTER — TELEPHONE (OUTPATIENT)
Dept: NEPHROLOGY | Facility: CLINIC | Age: 40
End: 2025-01-17

## 2025-01-17 NOTE — TELEPHONE ENCOUNTER
Called pt and rescheduled appt this morning due to provider schedule change. Appt was rescheduled to 2/14 at 8:30am with Dr Franklin in the [facility].

## 2025-01-23 ENCOUNTER — DOCUMENTATION (OUTPATIENT)
Dept: NEPHROLOGY | Facility: CLINIC | Age: 40
End: 2025-01-23

## 2025-01-23 LAB
CREAT ?TM UR-SCNC: 167 UMOL/L
EXT ALBUMIN URINE RANDOM: 0.7
EXT GLUCOSE BLD: 82
EXTERNAL BUN: 11
EXTERNAL CALCIUM: 9.9
EXTERNAL CHLORIDE: 102
EXTERNAL CO2: 18
EXTERNAL CREATININE: 1.24
EXTERNAL EGFR: 77
EXTERNAL POTASSIUM: 4.4
EXTERNAL SODIUM: 141
MICROALBUMIN/CREAT UR: 4 MG/G{CREAT}

## 2025-01-23 NOTE — TELEPHONE ENCOUNTER
Patient called in asking if he needs to have updated labs done since his appt had to be changed??    Patient would like a call back.

## 2025-01-24 NOTE — TELEPHONE ENCOUNTER
LM to patient with the following information:recent labs are stable. No need for new labs for his upcoming apt in few weeks. Advised patient to please call our office to let us know he received the message and if have any other questions or concerns.

## 2025-01-24 NOTE — TELEPHONE ENCOUNTER
Patient returning call and made aware:    Adria Franklin MD to Aimee Nguyen MA       1/23/25  7:06 PM  Please tell patient that recent labs are stable.  No need for new labs for his upcoming apt in few weeks  Thanks,    Patient verbalized understanding.

## 2025-02-01 ENCOUNTER — HOSPITAL ENCOUNTER (EMERGENCY)
Facility: HOSPITAL | Age: 40
Discharge: HOME | End: 2025-02-01
Attending: EMERGENCY MEDICINE
Payer: COMMERCIAL

## 2025-02-01 VITALS
RESPIRATION RATE: 18 BRPM | SYSTOLIC BLOOD PRESSURE: 135 MMHG | OXYGEN SATURATION: 100 % | DIASTOLIC BLOOD PRESSURE: 93 MMHG | HEART RATE: 85 BPM | TEMPERATURE: 97.8 F

## 2025-02-01 DIAGNOSIS — R03.0 ELEVATED BLOOD PRESSURE READING: Primary | ICD-10-CM

## 2025-02-01 PROCEDURE — 93005 ELECTROCARDIOGRAM TRACING: CPT

## 2025-02-01 PROCEDURE — 99284 EMERGENCY DEPT VISIT MOD MDM: CPT | Performed by: EMERGENCY MEDICINE

## 2025-02-01 PROCEDURE — 99283 EMERGENCY DEPT VISIT LOW MDM: CPT

## 2025-02-01 NOTE — ED PROVIDER NOTES
Time reflects when diagnosis was documented in both MDM as applicable and the Disposition within this note       Time User Action Codes Description Comment    2/1/2025 12:02 PM DiegoSrini coon Add [R03.0] Elevated blood pressure reading           ED Disposition       ED Disposition   Discharge    Condition   Stable    Date/Time   Sat Feb 1, 2025 12:02 PM    Comment   Vinay Breen discharge to home/self care.                   Assessment & Plan       Medical Decision Making  39-year-old male, presenting to the emergency department for evaluation of symptomatic hypertension earlier this morning, with BP 160s systolic at home.  Here, patient reports improvement of symptoms, mild intermittent dizziness.  Exam, vital signs reassuring, repeat blood pressure 135/93.    Differential diagnoses included but not limited to: stress/anxiety, arrhythmia, dehydration.    Orders written for EKG.    See ED course for full details.     On reevaluation, patient reports resolution of symptoms, appears well, is in no acute distress, comfortable discharge at this time.  Advised the patient that given he does not have a family medicine doctor, he will be referred to one here at [facility] to have appropriate follow up for suspected hypertension diagnosis.   Patient has regular cardiac tones, lungs clear to auscultation, remains neurologically intact, ambulatory with a steady gait.  Advised the patient on the emergent signs and symptoms for which the patient should urgently return to the ED and encouraged continued follow-up with referred PCP within 1 week for reevaluation of symptoms.  Patient verbalized understanding of plan of care was given tenderness questions.              Medications - No data to display    ED Risk Strat Scores                                              History of Present Illness       Chief Complaint   Patient presents with    Hypertension     Patient presents for hypertensive episode at home that began this  morning. 169/103 at home. Denies use of BP medications. Endorses stress of being unemployed.        Past Medical History:   Diagnosis Date    Renal failure       Past Surgical History:   Procedure Laterality Date    CYSTOSCOPY  05/21/2024    Dr. Palacios    TONSILLECTOMY AND ADENOIDECTOMY        Family History   Problem Relation Age of Onset    Stroke Father     Diabetes Father     Kidney disease Paternal Aunt     Kidney disease Paternal Uncle     Kidney disease Paternal Grandfather     Cancer Maternal Grandmother     Heart disease Maternal Grandmother       Social History     Tobacco Use    Smoking status: Never    Smokeless tobacco: Never   Vaping Use    Vaping status: Never Used   Substance Use Topics    Alcohol use: No    Drug use: No      E-Cigarette/Vaping    E-Cigarette Use Never User       E-Cigarette/Vaping Substances      I have reviewed and agree with the history as documented.       Hypertension    Patient is a 39-year-old male,  with a history of UPJ obstruction chronically, who presents to the emergency department for evaluation of symptomatic high blood pressure, beginning this morning when he woke up.  Patient states this morning he felt dizzy, like the room was spinning, states he took his blood pressure and found his blood pressure in the 160s.  At that time felt palpitations, increased warmth, abdominal cramping.  States he took 2 Tylenol after symptom onset with improvement of symptoms.  States he now feels slightly dizzy still which is intermittent.  Denies any chest pain, nausea vomiting, visual complaints.     Review of Systems   All other systems reviewed and are negative.          Objective       ED Triage Vitals   Temperature Pulse Blood Pressure Respirations SpO2 Patient Position - Orthostatic VS   02/01/25 1110 02/01/25 1108 02/01/25 1108 02/01/25 1108 02/01/25 1108 02/01/25 1108   97.8 °F (36.6 °C) 85 135/93 18 100 % Sitting      Temp Source Heart Rate Source BP Location FiO2 (%) Pain  Score    02/01/25 1110 02/01/25 1108 02/01/25 1108 -- --    Oral Monitor Right arm        Vitals      Date and Time Temp Pulse SpO2 Resp BP Pain Score FACES Pain Rating User   02/01/25 1110 97.8 °F (36.6 °C) -- -- -- -- -- -- SM   02/01/25 1108 -- 85 100 % 18 135/93 -- --             Physical Exam  Vitals reviewed.   Constitutional:       General: He is not in acute distress.     Appearance: Normal appearance. He is not ill-appearing, toxic-appearing or diaphoretic.   HENT:      Head: Normocephalic and atraumatic.      Right Ear: External ear normal.      Left Ear: External ear normal.      Mouth/Throat:      Mouth: Mucous membranes are moist.   Eyes:      Extraocular Movements: Extraocular movements intact.      Pupils: Pupils are equal, round, and reactive to light.   Cardiovascular:      Rate and Rhythm: Normal rate and regular rhythm.      Heart sounds: Normal heart sounds. No murmur heard.  Pulmonary:      Breath sounds: Normal breath sounds. No wheezing, rhonchi or rales.   Chest:      Chest wall: No tenderness.   Abdominal:      Palpations: Abdomen is soft.      Tenderness: There is no abdominal tenderness.   Musculoskeletal:         General: Normal range of motion.   Skin:     General: Skin is warm.      Capillary Refill: Capillary refill takes less than 2 seconds.      Findings: No rash.   Neurological:      General: No focal deficit present.      Mental Status: He is alert.      Cranial Nerves: No cranial nerve deficit.      Sensory: No sensory deficit.      Motor: No weakness.      Coordination: Coordination normal.      Gait: Gait normal.      Comments: No focal neurologic deficit.    Psychiatric:         Mood and Affect: Mood normal.         Behavior: Behavior normal.         Thought Content: Thought content normal.         Judgment: Judgment normal.         Results Reviewed       None            No orders to display       ECG 12 Lead Documentation Only    Date/Time: 2/1/2025 4:39 PM    Performed by:  Srini GARRIDO DO  Authorized by: Srini GARRIDO DO    Indications / Diagnosis:  HTN  ECG reviewed by me, the ED Provider: yes    Patient location:  ED  Previous ECG:     Previous ECG:  Unavailable  Interpretation:     Interpretation: normal    Rate:     ECG rate:  80 bpm    ECG rate assessment: normal    Rhythm:     Rhythm: sinus rhythm    Ectopy:     Ectopy: none    QRS:     QRS axis:  Normal    QRS intervals:  Normal  Conduction:     Conduction: normal    ST segments:     ST segments:  Normal  T waves:     T waves: normal        ED Medication and Procedure Management   Prior to Admission Medications   Prescriptions Last Dose Informant Patient Reported? Taking?   Ascorbic Acid (vitamin C) 100 MG tablet  Self Yes No   Sig: Take 100 mg by mouth daily   Cholecalciferol (VITAMIN D PO)  Self Yes No   Sig: Take 5,000 Units by mouth daily    Mirabegron ER 25 MG TB24  Self No No   Sig: Take 25 mg by mouth in the morning   Patient not taking: Reported on 2024   acetaminophen (TYLENOL) 325 mg tablet  Self Yes No   Sig: Take 650 mg by mouth every 6 (six) hours as needed for mild pain   albuterol (PROVENTIL HFA,VENTOLIN HFA) 90 mcg/act inhaler  Self Yes No   Sig: Inhale 2 puffs every 6 (six) hours as needed for wheezing   cholecalciferol (VITAMIN D3) 1,000 units tablet  Self Yes No   Sig: Take 2,000 Units by mouth daily   Patient not taking: Reported on 2023   cyclobenzaprine (FLEXERIL) 5 mg tablet  Self No No   Sig: Take 1 tablet (5 mg total) by mouth 3 (three) times a day as needed for muscle spasms for up to 9 doses   Patient not taking: Reported on 10/24/2023   erythromycin (ILOTYCIN) ophthalmic ointment  Self Yes No   Sig: Apply 1/2 inch ribbon to both lower eyelids three times a day for 7 days. Use after cleansing and warm compress.   fluticasone (FLONASE) 50 mcg/act nasal spray   Yes No   Si sprays into each nostril daily    Patient not taking: Reported on 2021   ondansetron (ZOFRAN-ODT) 4  mg disintegrating tablet   No No   Sig: Take 1 tablet (4 mg total) by mouth every 6 (six) hours as needed for nausea or vomiting for up to 10 doses   predniSONE 10 mg tablet  Self Yes No   Sig: if needed   Patient not taking: Reported on 11/17/2022   prednisoLONE acetate (PRED FORTE) 1 % ophthalmic suspension  Self Yes No   Sig: PLEASE SEE ATTACHED FOR DETAILED DIRECTIONS   tamsulosin (FLOMAX) 0.4 mg  Self Yes No   Sig: Take 0.4 mg by mouth daily with dinner   Patient not taking: Reported on 5/6/2024   trospium chloride (SANCTURA) 20 mg tablet  Self No No   Sig: Take 1 tablet (20 mg total) by mouth 2 (two) times a day   Patient not taking: Reported on 12/9/2024   vitamin B-12 (VITAMIN B-12) 500 mcg tablet  Self Yes No   Sig: Take 500 mcg by mouth daily      Facility-Administered Medications: None     Discharge Medication List as of 2/1/2025 12:09 PM        CONTINUE these medications which have NOT CHANGED    Details   acetaminophen (TYLENOL) 325 mg tablet Take 650 mg by mouth every 6 (six) hours as needed for mild pain, Historical Med      albuterol (PROVENTIL HFA,VENTOLIN HFA) 90 mcg/act inhaler Inhale 2 puffs every 6 (six) hours as needed for wheezing, Historical Med      Ascorbic Acid (vitamin C) 100 MG tablet Take 100 mg by mouth daily, Historical Med      !! Cholecalciferol (VITAMIN D PO) Take 5,000 Units by mouth daily , Historical Med      !! cholecalciferol (VITAMIN D3) 1,000 units tablet Take 2,000 Units by mouth daily, Historical Med      cyclobenzaprine (FLEXERIL) 5 mg tablet Take 1 tablet (5 mg total) by mouth 3 (three) times a day as needed for muscle spasms for up to 9 doses, Starting Mon 5/22/2023, Normal      erythromycin (ILOTYCIN) ophthalmic ointment Apply 1/2 inch ribbon to both lower eyelids three times a day for 7 days. Use after cleansing and warm compress., Historical Med      fluticasone (FLONASE) 50 mcg/act nasal spray 2 sprays into each nostril daily , Starting Fri 9/21/2018, Until Sat  9/21/2019, Historical Med      Mirabegron ER 25 MG TB24 Take 25 mg by mouth in the morning, Starting Wed 8/7/2024, Normal      ondansetron (ZOFRAN-ODT) 4 mg disintegrating tablet Take 1 tablet (4 mg total) by mouth every 6 (six) hours as needed for nausea or vomiting for up to 10 doses, Starting Mon 12/23/2024, Normal      prednisoLONE acetate (PRED FORTE) 1 % ophthalmic suspension PLEASE SEE ATTACHED FOR DETAILED DIRECTIONS, Historical Med      predniSONE 10 mg tablet if needed, Starting Sun 8/28/2022, Historical Med      tamsulosin (FLOMAX) 0.4 mg Take 0.4 mg by mouth daily with dinner, Starting Thu 11/2/2023, Until Fri 11/1/2024, Historical Med      trospium chloride (SANCTURA) 20 mg tablet Take 1 tablet (20 mg total) by mouth 2 (two) times a day, Starting Wed 8/7/2024, Normal      vitamin B-12 (VITAMIN B-12) 500 mcg tablet Take 500 mcg by mouth daily, Historical Med       !! - Potential duplicate medications found. Please discuss with provider.          ED SEPSIS DOCUMENTATION   Time reflects when diagnosis was documented in both MDM as applicable and the Disposition within this note       Time User Action Codes Description Comment    2/1/2025 12:02 PM Srini So Add [R03.0] Elevated blood pressure reading                  Srini GARRIDO DO  02/01/25 4163

## 2025-02-01 NOTE — ED ATTENDING ATTESTATION
"2/1/2025  I, Traci Cooper MD, saw and evaluated the patient. I have discussed the patient with the resident/non-physician practitioner and agree with the resident's/non-physician practitioner's findings, Plan of Care, and MDM as documented in the resident's/non-physician practitioner's note, except where noted. All available labs and Radiology studies were reviewed.  I was present for key portions of any procedure(s) performed by the resident/non-physician practitioner and I was immediately available to provide assistance.       At this point I agree with the current assessment done in the Emergency Department.  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  Patient presents for evaluation after an elevated blood pressure in the 160s systolic at home this morning.  Patient states that when he laid down to go to sleep last night he felt \"like my breath was getting caught in my chest.\"  He denies any chest pain, but felt like it was hard to breathe and like his heart was racing.  He does admit to recently feeling anxious over being unable to find employment.  He eventually was able to fall asleep.  When he woke up in the morning he felt the same and also felt flushed, prompting him to take his blood pressure which was subsequently elevated.  His breathing symptoms and sensation of his heart racing have since resolved.  Blood pressure on arrival was 135/93.  He denies any headache or vision changes.  Reports some mild abdominal cramping this morning after taking MiraLAX, no abdominal pain currently.  He did at 1 point feel like the room was briefly moving, that is since resolved, occurred with position change.  No nausea or vomiting.  Denies fevers or chills.  No prior history of hypertension.  Physical Exam  Vitals and nursing note reviewed.   Constitutional:       " General: He is not in acute distress.     Appearance: He is well-developed. He is not ill-appearing, toxic-appearing or diaphoretic.   HENT:      Head: Normocephalic and atraumatic.      Right Ear: External ear normal.      Left Ear: External ear normal.      Nose: Nose normal.   Eyes:      Extraocular Movements: Extraocular movements intact.      Conjunctiva/sclera: Conjunctivae normal.      Pupils: Pupils are equal, round, and reactive to light.   Cardiovascular:      Rate and Rhythm: Normal rate and regular rhythm.      Pulses: Normal pulses.      Heart sounds: Normal heart sounds. No murmur heard.     No friction rub. No gallop.   Pulmonary:      Effort: Pulmonary effort is normal. No respiratory distress.      Breath sounds: Normal breath sounds. No wheezing or rales.   Abdominal:      General: Bowel sounds are normal. There is no distension.      Palpations: Abdomen is soft.      Tenderness: There is no abdominal tenderness. There is no right CVA tenderness, left CVA tenderness or guarding.   Musculoskeletal:         General: No deformity. Normal range of motion.      Cervical back: Normal range of motion and neck supple.      Right lower leg: No edema.      Left lower leg: No edema.      Comments: No calf swelling or tenderness   Skin:     General: Skin is warm and dry.   Neurological:      Mental Status: He is alert and oriented to person, place, and time.      Motor: No abnormal muscle tone.      Comments: Face symmetric, tongue midline, 5/5 strength in the proximal and distal upper and lower extremities bilaterally with intact sensation to light touch throughout.  CN II-XII intact. Normal finger-to-nose, rapid alternating movements, and heel-to-shin bilaterally.  Normal speech, normal gait. No pronator drift.              ED Course     Patient is well-appearing.  Blood pressure is improved to 135/93 without intervention.  No longer feeling dizzy.  Neurologic exam is completely normal as above without  evidence of ataxia.  Patient walked across the hospital without difficulty to get to his exam room.  He is satting 100% on room air.  No increased work of breathing and lungs are clear to auscultation bilaterally.  Out of an abundance of caution, will check EKG, but low suspicion for ACS.  Patient is low risk by Wells criteria, PERC negative, doubt PE.  No evidence of neurologic issue as the cause of the patient's symptoms.  Abdomen is completely benign to deep palpation.  Will recommend follow-up with the PCP in 1 to 2 weeks for blood pressure recheck.  Patient and his mother counseled that if blood pressure remains elevated he may need to be started on antihypertensive agent.  Return precautions discussed and patient discharged in good condition.    I personally interpreted the patient's EKG which reveals normal rate, normal sinus rhythm, normal axis, normal intervals, no ischemic changes.    Critical Care Time  Procedures

## 2025-02-02 LAB
ATRIAL RATE: 80 BPM
P AXIS: 29 DEGREES
PR INTERVAL: 156 MS
QRS AXIS: 80 DEGREES
QRSD INTERVAL: 78 MS
QT INTERVAL: 350 MS
QTC INTERVAL: 404 MS
T WAVE AXIS: 45 DEGREES
VENTRICULAR RATE: 80 BPM

## 2025-02-02 PROCEDURE — 93010 ELECTROCARDIOGRAM REPORT: CPT | Performed by: INTERNAL MEDICINE

## 2025-02-11 ENCOUNTER — HOSPITAL ENCOUNTER (EMERGENCY)
Facility: HOSPITAL | Age: 40
Discharge: HOME | End: 2025-02-12
Attending: EMERGENCY MEDICINE | Admitting: EMERGENCY MEDICINE
Payer: COMMERCIAL

## 2025-02-11 VITALS
RESPIRATION RATE: 18 BRPM | HEART RATE: 102 BPM | DIASTOLIC BLOOD PRESSURE: 92 MMHG | SYSTOLIC BLOOD PRESSURE: 140 MMHG | OXYGEN SATURATION: 95 % | TEMPERATURE: 98.1 F

## 2025-02-11 DIAGNOSIS — K21.9 GASTRIC REFLUX: Primary | ICD-10-CM

## 2025-02-11 DIAGNOSIS — F41.9 ANXIETY: ICD-10-CM

## 2025-02-11 DIAGNOSIS — R00.2 PALPITATIONS: ICD-10-CM

## 2025-02-11 LAB
ALBUMIN SERPL BCG-MCNC: 4.9 G/DL (ref 3.5–5)
ALP SERPL-CCNC: 68 U/L (ref 34–104)
ALT SERPL W P-5'-P-CCNC: 16 U/L (ref 7–52)
ANION GAP SERPL CALCULATED.3IONS-SCNC: 7 MMOL/L (ref 4–13)
AST SERPL W P-5'-P-CCNC: 21 U/L (ref 13–39)
BASOPHILS # BLD AUTO: 0.03 THOUSANDS/ΜL (ref 0–0.1)
BASOPHILS NFR BLD AUTO: 0 % (ref 0–1)
BILIRUB SERPL-MCNC: 0.26 MG/DL (ref 0.2–1)
BUN SERPL-MCNC: 23 MG/DL (ref 5–25)
CALCIUM SERPL-MCNC: 9.7 MG/DL (ref 8.4–10.2)
CARDIAC TROPONIN I PNL SERPL HS: 4 NG/L (ref ?–50)
CHLORIDE SERPL-SCNC: 105 MMOL/L (ref 96–108)
CO2 SERPL-SCNC: 24 MMOL/L (ref 21–32)
CREAT SERPL-MCNC: 1.32 MG/DL (ref 0.6–1.3)
EOSINOPHIL # BLD AUTO: 0.08 THOUSAND/ΜL (ref 0–0.61)
EOSINOPHIL NFR BLD AUTO: 1 % (ref 0–6)
ERYTHROCYTE [DISTWIDTH] IN BLOOD BY AUTOMATED COUNT: 12.8 % (ref 11.6–15.1)
GFR SERPL CREATININE-BSD FRML MDRD: 68 ML/MIN/1.73SQ M
GLUCOSE SERPL-MCNC: 102 MG/DL (ref 65–140)
HCT VFR BLD AUTO: 42.7 % (ref 36.5–49.3)
HGB BLD-MCNC: 14.3 G/DL (ref 12–17)
IMM GRANULOCYTES # BLD AUTO: 0.05 THOUSAND/UL (ref 0–0.2)
IMM GRANULOCYTES NFR BLD AUTO: 1 % (ref 0–2)
LYMPHOCYTES # BLD AUTO: 0.81 THOUSANDS/ΜL (ref 0.6–4.47)
LYMPHOCYTES NFR BLD AUTO: 8 % (ref 14–44)
MCH RBC QN AUTO: 30 PG (ref 26.8–34.3)
MCHC RBC AUTO-ENTMCNC: 33.5 G/DL (ref 31.4–37.4)
MCV RBC AUTO: 90 FL (ref 82–98)
MONOCYTES # BLD AUTO: 0.68 THOUSAND/ΜL (ref 0.17–1.22)
MONOCYTES NFR BLD AUTO: 7 % (ref 4–12)
NEUTROPHILS # BLD AUTO: 8.77 THOUSANDS/ΜL (ref 1.85–7.62)
NEUTS SEG NFR BLD AUTO: 83 % (ref 43–75)
NRBC BLD AUTO-RTO: 0 /100 WBCS
PLATELET # BLD AUTO: 299 THOUSANDS/UL (ref 149–390)
PMV BLD AUTO: 10.2 FL (ref 8.9–12.7)
POTASSIUM SERPL-SCNC: 4 MMOL/L (ref 3.5–5.3)
PROT SERPL-MCNC: 7.8 G/DL (ref 6.4–8.4)
RBC # BLD AUTO: 4.76 MILLION/UL (ref 3.88–5.62)
SODIUM SERPL-SCNC: 136 MMOL/L (ref 135–147)
WBC # BLD AUTO: 10.42 THOUSAND/UL (ref 4.31–10.16)

## 2025-02-11 PROCEDURE — 93005 ELECTROCARDIOGRAM TRACING: CPT

## 2025-02-11 PROCEDURE — 84484 ASSAY OF TROPONIN QUANT: CPT

## 2025-02-11 PROCEDURE — 36415 COLL VENOUS BLD VENIPUNCTURE: CPT

## 2025-02-11 PROCEDURE — 85025 COMPLETE CBC W/AUTO DIFF WBC: CPT

## 2025-02-11 PROCEDURE — 96361 HYDRATE IV INFUSION ADD-ON: CPT

## 2025-02-11 PROCEDURE — 99285 EMERGENCY DEPT VISIT HI MDM: CPT

## 2025-02-11 PROCEDURE — 80053 COMPREHEN METABOLIC PANEL: CPT

## 2025-02-11 RX ADMIN — SODIUM CHLORIDE 1000 ML: 0.9 INJECTION, SOLUTION INTRAVENOUS at 23:52

## 2025-02-12 ENCOUNTER — APPOINTMENT (EMERGENCY)
Dept: RADIOLOGY | Facility: HOSPITAL | Age: 40
End: 2025-02-12
Payer: COMMERCIAL

## 2025-02-12 LAB
2HR DELTA HS TROPONIN: 0 NG/L
ATRIAL RATE: 106 BPM
CARDIAC TROPONIN I PNL SERPL HS: 4 NG/L (ref ?–50)
FLUAV AG UPPER RESP QL IA.RAPID: NEGATIVE
FLUBV AG UPPER RESP QL IA.RAPID: NEGATIVE
P AXIS: 44 DEGREES
PR INTERVAL: 144 MS
QRS AXIS: 71 DEGREES
QRSD INTERVAL: 74 MS
QT INTERVAL: 318 MS
QTC INTERVAL: 422 MS
SARS-COV+SARS-COV-2 AG RESP QL IA.RAPID: NEGATIVE
T WAVE AXIS: 51 DEGREES
VENTRICULAR RATE: 106 BPM

## 2025-02-12 PROCEDURE — 93010 ELECTROCARDIOGRAM REPORT: CPT | Performed by: INTERNAL MEDICINE

## 2025-02-12 PROCEDURE — 87804 INFLUENZA ASSAY W/OPTIC: CPT

## 2025-02-12 PROCEDURE — 96374 THER/PROPH/DIAG INJ IV PUSH: CPT

## 2025-02-12 PROCEDURE — 87811 SARS-COV-2 COVID19 W/OPTIC: CPT

## 2025-02-12 PROCEDURE — 71046 X-RAY EXAM CHEST 2 VIEWS: CPT

## 2025-02-12 PROCEDURE — 99284 EMERGENCY DEPT VISIT MOD MDM: CPT

## 2025-02-12 PROCEDURE — 96375 TX/PRO/DX INJ NEW DRUG ADDON: CPT

## 2025-02-12 RX ORDER — KETOROLAC TROMETHAMINE 30 MG/ML
15 INJECTION, SOLUTION INTRAMUSCULAR; INTRAVENOUS ONCE
Status: DISCONTINUED | OUTPATIENT
Start: 2025-02-12 | End: 2025-02-12

## 2025-02-12 RX ORDER — FAMOTIDINE 20 MG/1
20 TABLET, FILM COATED ORAL 2 TIMES DAILY
Qty: 30 TABLET | Refills: 0 | Status: SHIPPED | OUTPATIENT
Start: 2025-02-12

## 2025-02-12 RX ORDER — KETOROLAC TROMETHAMINE 30 MG/ML
15 INJECTION, SOLUTION INTRAMUSCULAR; INTRAVENOUS ONCE
Status: COMPLETED | OUTPATIENT
Start: 2025-02-12 | End: 2025-02-12

## 2025-02-12 RX ORDER — HYDROXYZINE HYDROCHLORIDE 25 MG/1
25 TABLET, FILM COATED ORAL EVERY 6 HOURS
Qty: 12 TABLET | Refills: 0 | Status: SHIPPED | OUTPATIENT
Start: 2025-02-12

## 2025-02-12 RX ORDER — HYDROXYZINE HYDROCHLORIDE 25 MG/1
25 TABLET, FILM COATED ORAL ONCE
Status: COMPLETED | OUTPATIENT
Start: 2025-02-12 | End: 2025-02-12

## 2025-02-12 RX ORDER — ALUMINUM HYDROXIDE, MAGNESIUM HYDROXIDE, SIMETHICONE 400; 400; 40 MG/10ML; MG/10ML; MG/10ML
30 SUSPENSION ORAL
Qty: 3600 ML | Refills: 0 | Status: SHIPPED | OUTPATIENT
Start: 2025-02-12

## 2025-02-12 RX ORDER — MAGNESIUM HYDROXIDE/ALUMINUM HYDROXICE/SIMETHICONE 120; 1200; 1200 MG/30ML; MG/30ML; MG/30ML
30 SUSPENSION ORAL ONCE
Status: COMPLETED | OUTPATIENT
Start: 2025-02-12 | End: 2025-02-12

## 2025-02-12 RX ORDER — FAMOTIDINE 10 MG/ML
20 INJECTION, SOLUTION INTRAVENOUS ONCE
Status: COMPLETED | OUTPATIENT
Start: 2025-02-12 | End: 2025-02-12

## 2025-02-12 RX ADMIN — KETOROLAC TROMETHAMINE 15 MG: 30 INJECTION, SOLUTION INTRAMUSCULAR; INTRAVENOUS at 00:15

## 2025-02-12 RX ADMIN — ALUMINUM HYDROXIDE, MAGNESIUM HYDROXIDE, AND DIMETHICONE 30 ML: 200; 20; 200 SUSPENSION ORAL at 00:22

## 2025-02-12 RX ADMIN — FAMOTIDINE 20 MG: 10 INJECTION INTRAVENOUS at 00:15

## 2025-02-12 RX ADMIN — HYDROXYZINE HYDROCHLORIDE 25 MG: 25 TABLET ORAL at 00:16

## 2025-02-12 NOTE — ED PROVIDER NOTES
Time reflects when diagnosis was documented in both MDM as applicable and the Disposition within this note       Time User Action Codes Description Comment    2/12/2025  1:11 AM Colin Hurt [K21.9] Gastric reflux     2/12/2025  1:12 AM Colin Hurt [R00.2] Palpitations     2/12/2025  1:12 AM Colin Hurt [F41.9] Anxiety           ED Disposition       ED Disposition   Discharge    Condition   Stable    Date/Time   Wed Feb 12, 2025  1:11 AM    Comment   Vinay Breen discharge to home/self care.                   Assessment & Plan       Medical Decision Making  Patient is a 39-year-old male present emergency department with chest pain and shortness of breath that started after a run today.  Patient states that he also had associated tachycardia when he got home.  Patient states he was recording his heart rate on his run which came up to 130.  Patient states since then has been elevated over 100. Patient also admits to associated foreign body sensation in the back of the throat. DDx including but not limited to: metabolic abnormality, cardiac arrhythmia, ACS, MI,  thyroid disease, PE, anxiety, adverse reaction.  EKG shows no acute changes.  CBC shows mild leukocytosis at 10.42 nonspecific.  CMP shows mildly elevated creatinine 1.32 also nonspecific.  COVID/flu testing negative.  Delta at first hour 4, with a repeat 2-hour delta 0 with a troponin of 4.  ACS highly unlikely due to heart score of 1.  Patient discharged with cardiology follow-up for potential palpitations.  Findings consistent with anxiety and GERD.  Patient treated empirically.  Discussed with patient to follow-up with cardiology within 1 week for potential further evaluation of rapid heart rate/palpitations.  Patient given strict return precautions to return to emergency room for increasing fever, aches, chills, chest pain, palpitations, shortness of breath, peripheral swelling, productive cough, or intractable nausea and vomiting.   Patient verbalized understanding and agrees with plan.  Patient discharged home in stable condition at this time.    Amount and/or Complexity of Data Reviewed  Labs: ordered.    Risk  OTC drugs.  Prescription drug management.             Medications   sodium chloride 0.9 % bolus 1,000 mL (0 mL Intravenous Stopped 2/12/25 0115)   Famotidine (PF) (PEPCID) injection 20 mg (20 mg Intravenous Given 2/12/25 0015)   hydrOXYzine HCL (ATARAX) tablet 25 mg (25 mg Oral Given 2/12/25 0016)   aluminum-magnesium hydroxide-simethicone (MAALOX) oral suspension 30 mL (30 mL Oral Given 2/12/25 0022)   ketorolac (TORADOL) injection 15 mg (15 mg Intravenous Given 2/12/25 0015)       ED Risk Strat Scores   HEART Risk Score      Flowsheet Row Most Recent Value   Heart Score Risk Calculator    History 0 Filed at: 02/12/2025 0159   ECG 0 Filed at: 02/12/2025 0159   Age 0 Filed at: 02/12/2025 0159   Risk Factors 1 Filed at: 02/12/2025 0159   Troponin 0 Filed at: 02/12/2025 0159   HEART Score 1 Filed at: 02/12/2025 0159          HEART Risk Score      Flowsheet Row Most Recent Value   Heart Score Risk Calculator    History 0 Filed at: 02/12/2025 0159   ECG 0 Filed at: 02/12/2025 0159   Age 0 Filed at: 02/12/2025 0159   Risk Factors 1 Filed at: 02/12/2025 0159   Troponin 0 Filed at: 02/12/2025 0159   HEART Score 1 Filed at: 02/12/2025 0159                                                History of Present Illness       Chief Complaint   Patient presents with    Rapid Heart Rate     Reports chest pain today with SOB, reports hypertension with tachycardia at home. +dizziness        Past Medical History:   Diagnosis Date    Renal failure       Past Surgical History:   Procedure Laterality Date    CYSTOSCOPY  05/21/2024    Dr. Palacios    TONSILLECTOMY AND ADENOIDECTOMY        Family History   Problem Relation Age of Onset    Stroke Father     Diabetes Father     Kidney disease Paternal Aunt     Kidney disease Paternal Uncle     Kidney disease  Paternal Grandfather     Cancer Maternal Grandmother     Heart disease Maternal Grandmother       Social History     Tobacco Use    Smoking status: Never    Smokeless tobacco: Never   Vaping Use    Vaping status: Never Used   Substance Use Topics    Alcohol use: No    Drug use: No      E-Cigarette/Vaping    E-Cigarette Use Never User       E-Cigarette/Vaping Substances    Nicotine No     THC No     CBD No       I have reviewed and agree with the history as documented.     Patient is a 39-year-old male present emergency department with chest pain and shortness of breath that started after a run today.  Patient states that he also had associated tachycardia when he got home.  Patient states he was recording his heart rate on his run which came up to 130.  Patient states since then has been elevated over 100.  Patient states he also has associated dizziness that he describes the room as spinning.  Patient states that he has a positive family history of heart attack with his father at the age of 40.  Patient also admits to past medical history of only having 1 kidney.  Patient and patient's mother just worried about potential MI due to family history.  Patient also admits to associated foreign body sensation in the back of the throat.  Patient denies any fever, body aches, chills, nasal congestion, cough, sore throat, nausea, vomiting, diarrhea, abdominal pain, or any  symptoms at this time.      Rapid Heart Rate  Associated symptoms: chest pain and dizziness    Associated symptoms: no back pain, no cough, no nausea, no shortness of breath, no vomiting and no weakness        Review of Systems   Constitutional:  Negative for activity change, appetite change, chills and fever.   HENT:  Negative for congestion, dental problem, ear pain, rhinorrhea, sinus pressure, sinus pain and sore throat.         Admits to foreign body sensation in the back of the throat   Eyes:  Negative for pain, discharge, itching and visual  disturbance.   Respiratory:  Negative for cough and shortness of breath.    Cardiovascular:  Positive for chest pain and palpitations. Negative for leg swelling.   Gastrointestinal:  Negative for abdominal pain, constipation, diarrhea, nausea and vomiting.   Genitourinary:  Negative for dysuria and hematuria.   Musculoskeletal:  Negative for arthralgias and back pain.   Skin:  Negative for color change and rash.   Neurological:  Positive for dizziness. Negative for seizures, syncope, weakness, light-headedness and headaches.   All other systems reviewed and are negative.          Objective       ED Triage Vitals   Temperature Pulse Blood Pressure Respirations SpO2 Patient Position - Orthostatic VS   02/11/25 2213 02/11/25 2211 02/11/25 2211 02/11/25 2211 02/11/25 2211 02/11/25 2349   98.1 °F (36.7 °C) 104 167/79 18 98 % Sitting      Temp Source Heart Rate Source BP Location FiO2 (%) Pain Score    02/11/25 2213 02/11/25 2211 02/11/25 2349 -- 02/11/25 2349    Oral Monitor Left arm  No Pain      Vitals      Date and Time Temp Pulse SpO2 Resp BP Pain Score FACES Pain Rating User   02/11/25 2349 -- 102 95 % 18 140/92 No Pain -- CH   02/11/25 2213 98.1 °F (36.7 °C) -- -- -- -- -- -- SH   02/11/25 2211 -- 104 98 % 18 167/79 -- --             Physical Exam  Vitals and nursing note reviewed.   Constitutional:       General: He is not in acute distress.     Appearance: Normal appearance. He is well-developed. He is not ill-appearing.   HENT:      Head: Normocephalic and atraumatic.      Right Ear: Tympanic membrane and external ear normal. There is no impacted cerumen.      Left Ear: Tympanic membrane and external ear normal. There is no impacted cerumen.      Nose: Nose normal. No congestion or rhinorrhea.      Mouth/Throat:      Mouth: Mucous membranes are moist.      Pharynx: Oropharynx is clear. No oropharyngeal exudate or posterior oropharyngeal erythema.   Eyes:      General:         Right eye: No discharge.          Left eye: No discharge.      Extraocular Movements: Extraocular movements intact.      Conjunctiva/sclera: Conjunctivae normal.      Pupils: Pupils are equal, round, and reactive to light.   Cardiovascular:      Rate and Rhythm: Normal rate and regular rhythm.      Heart sounds: No murmur heard.     No friction rub. No gallop.   Pulmonary:      Effort: Pulmonary effort is normal. No respiratory distress.      Breath sounds: Normal breath sounds. No stridor. No wheezing, rhonchi or rales.   Chest:      Chest wall: No tenderness.   Abdominal:      General: There is no distension.      Palpations: Abdomen is soft.      Tenderness: There is no abdominal tenderness. There is no right CVA tenderness, left CVA tenderness or guarding.   Musculoskeletal:         General: No swelling or deformity.      Cervical back: Neck supple. No rigidity or tenderness.      Right lower leg: No edema.      Left lower leg: No edema.      Comments: Tenderness to palpation of the sternal border, reproducible chest pain.   Lymphadenopathy:      Cervical: No cervical adenopathy.   Skin:     General: Skin is warm and dry.      Capillary Refill: Capillary refill takes less than 2 seconds.      Coloration: Skin is not jaundiced or pale.      Findings: No bruising, erythema, lesion or rash.   Neurological:      Mental Status: He is alert.   Psychiatric:         Mood and Affect: Mood normal.         Results Reviewed       Procedure Component Value Units Date/Time    FLU/COVID Rapid Antigen (30 min. TAT) - Preferred screening test in ED [[id number]]  (Normal) Collected: 02/12/25 0016    Lab Status: Final result Specimen: Nares from Nose Updated: 02/12/25 0044     SARS COV Rapid Antigen Negative     Influenza A Rapid Antigen Negative     Influenza B Rapid Antigen Negative    Narrative:      This test has been performed using the [employer] 2 FLU+SARS Antigen test under the Emergency Use Authorization (EUA). This test has been validated by the   and verified by the performing laboratory. The [employer]ian[employer] uses lateral flow immunofluorescent sandwich assay to detect SARS-COV, Influenza A and Influenza B Antigen.     The [employer] Dian[employer] 2 SARS Antigen test does not differentiate between SARS-CoV and SARS-CoV-2.     Negative results are presumptive and may be confirmed with a molecular assay, if necessary, for patient management. Negative results do not rule out SARS-CoV-2 or influenza infection and should not be used as the sole basis for treatment or patient management decisions. A negative test result may occur if the level of antigen in a sample is below the limit of detection of this test.     Positive results are indicative of the presence of viral antigens, but do not rule out bacterial infection or co-infection with other viruses.     All test results should be used as an adjunct to clinical observations and other information available to the provider.    FOR PEDIATRIC PATIENTS - copy/paste COVID Guidelines URL to browser: [email or web address]    HS Troponin I 2hr [[id number]]  (Normal) Collected: 02/11/25 2350    Lab Status: Final result Specimen: Blood from Arm, Left Updated: 02/12/25 0017     hs TnI 2hr 4 ng/L      Delta 2hr hsTnI 0 ng/L     HS Troponin I 4hr [[id number]]     Lab Status: No result Specimen: Blood     HS Troponin 0hr (reflex protocol) [[id number]]  (Normal) Collected: 02/11/25 2215    Lab Status: Final result Specimen: Blood from Arm, Left Updated: 02/11/25 2248     hs TnI 0hr 4 ng/L     Comprehensive metabolic panel [[id number]]  (Abnormal) Collected: 02/11/25 2215    Lab Status: Final result Specimen: Blood from Arm, Left Updated: 02/11/25 2240     Sodium 136 mmol/L      Potassium 4.0 mmol/L      Chloride 105 mmol/L      CO2 24 mmol/L      ANION GAP 7 mmol/L      BUN 23 mg/dL      Creatinine 1.32 mg/dL      Glucose 102 mg/dL      Calcium 9.7 mg/dL      AST 21 U/L      ALT 16 U/L       Alkaline Phosphatase 68 U/L      Total Protein 7.8 g/dL      Albumin 4.9 g/dL      Total Bilirubin 0.26 mg/dL      eGFR 68 ml/min/1.73sq m     Narrative:      National Kid[employer] guidelines for Chronic Kidney Disease (CKD):     Stage 1 with normal or high GFR (GFR > 90 mL/min/1.73 square meters)    Stage 2 Mild CKD (GFR = 60-89 mL/min/1.73 square meters)    Stage 3A Moderate CKD (GFR = 45-59 mL/min/1.73 square meters)    Stage 3B Moderate CKD (GFR = 30-44 mL/min/1.73 square meters)    Stage 4 Severe CKD (GFR = 15-29 mL/min/1.73 square meters)    Stage 5 End Stage CKD (GFR <15 mL/min/1.73 square meters)  Note: GFR calculation is accurate only with a steady state creatinine    CBC and differential [[id number]]  (Abnormal) Collected: 02/11/25 2215    Lab Status: Final result Specimen: Blood from Arm, Left Updated: 02/11/25 2226     WBC 10.42 Thousand/uL      RBC 4.76 Million/uL      Hemoglobin 14.3 g/dL      Hematocrit 42.7 %      MCV 90 fL      MCH 30.0 pg      MCHC 33.5 g/dL      RDW 12.8 %      MPV 10.2 fL      Platelets 299 Thousands/uL      nRBC 0 /100 WBCs      Segmented % 83 %      Immature Grans % 1 %      Lymphocytes % 8 %      Monocytes % 7 %      Eosinophils Relative 1 %      Basophils Relative 0 %      Absolute Neutrophils 8.77 Thousands/µL      Absolute Immature Grans 0.05 Thousand/uL      Absolute Lymphocytes 0.81 Thousands/µL      Absolute Monocytes 0.68 Thousand/µL      Eosinophils Absolute 0.08 Thousand/µL      Basophils Absolute 0.03 Thousands/µL             XR chest 2 views   Final Interpretation by Van Cantu MD (02/12 0020)      No acute cardiopulmonary disease.            Workstation performed: [id number]             Procedures    ED Medication and Procedure Management   Prior to Admission Medications   Prescriptions Last Dose Informant Patient Reported? Taking?   Ascorbic Acid (vitamin C) 100 MG tablet  Self Yes No   Sig: Take 100 mg by mouth daily   Cholecalciferol  (VITAMIN D PO)  Self Yes No   Sig: Take 5,000 Units by mouth daily    Mirabegron ER 25 MG TB24  Self No No   Sig: Take 25 mg by mouth in the morning   Patient not taking: Reported on 2024   acetaminophen (TYLENOL) 325 mg tablet  Self Yes No   Sig: Take 650 mg by mouth every 6 (six) hours as needed for mild pain   albuterol (PROVENTIL HFA,VENTOLIN HFA) 90 mcg/act inhaler  Self Yes No   Sig: Inhale 2 puffs every 6 (six) hours as needed for wheezing   cholecalciferol (VITAMIN D3) 1,000 units tablet  Self Yes No   Sig: Take 2,000 Units by mouth daily   Patient not taking: Reported on 2023   cyclobenzaprine (FLEXERIL) 5 mg tablet  Self No No   Sig: Take 1 tablet (5 mg total) by mouth 3 (three) times a day as needed for muscle spasms for up to 9 doses   Patient not taking: Reported on 10/24/2023   erythromycin (ILOTYCIN) ophthalmic ointment  Self Yes No   Sig: Apply 1/2 inch ribbon to both lower eyelids three times a day for 7 days. Use after cleansing and warm compress.   fluticasone (FLONASE) 50 mcg/act nasal spray   Yes No   Si sprays into each nostril daily    Patient not taking: Reported on 2021   ondansetron (ZOFRAN-ODT) 4 mg disintegrating tablet   No No   Sig: Take 1 tablet (4 mg total) by mouth every 6 (six) hours as needed for nausea or vomiting for up to 10 doses   predniSONE 10 mg tablet  Self Yes No   Sig: if needed   Patient not taking: Reported on 2022   prednisoLONE acetate (PRED FORTE) 1 % ophthalmic suspension  Self Yes No   Sig: PLEASE SEE ATTACHED FOR DETAILED DIRECTIONS   tamsulosin (FLOMAX) 0.4 mg  Self Yes No   Sig: Take 0.4 mg by mouth daily with dinner   Patient not taking: Reported on 2024   trospium chloride (SANCTURA) 20 mg tablet  Self No No   Sig: Take 1 tablet (20 mg total) by mouth 2 (two) times a day   Patient not taking: Reported on 2024   vitamin B-12 (VITAMIN B-12) 500 mcg tablet  Self Yes No   Sig: Take 500 mcg by mouth daily       Facility-Administered Medications: None     Discharge Medication List as of 2/12/2025  1:13 AM        START taking these medications    Details   aluminum-magnesium hydroxide-simethicone (MAALOX) 200-200-20 MG/5ML SUSP Take 30 mL by mouth 4 (four) times a day (before meals and at bedtime), Starting Wed 2/12/2025, Normal      famotidine (PEPCID) 20 mg tablet Take 1 tablet (20 mg total) by mouth 2 (two) times a day, Starting Wed 2/12/2025, Normal      hydrOXYzine HCL (ATARAX) 25 mg tablet Take 1 tablet (25 mg total) by mouth every 6 (six) hours, Starting Wed 2/12/2025, Normal           CONTINUE these medications which have NOT CHANGED    Details   acetaminophen (TYLENOL) 325 mg tablet Take 650 mg by mouth every 6 (six) hours as needed for mild pain, Historical Med      albuterol (PROVENTIL HFA,VENTOLIN HFA) 90 mcg/act inhaler Inhale 2 puffs every 6 (six) hours as needed for wheezing, Historical Med      Ascorbic Acid (vitamin C) 100 MG tablet Take 100 mg by mouth daily, Historical Med      !! Cholecalciferol (VITAMIN D PO) Take 5,000 Units by mouth daily , Historical Med      !! cholecalciferol (VITAMIN D3) 1,000 units tablet Take 2,000 Units by mouth daily, Historical Med      cyclobenzaprine (FLEXERIL) 5 mg tablet Take 1 tablet (5 mg total) by mouth 3 (three) times a day as needed for muscle spasms for up to 9 doses, Starting Mon 5/22/2023, Normal      erythromycin (ILOTYCIN) ophthalmic ointment Apply 1/2 inch ribbon to both lower eyelids three times a day for 7 days. Use after cleansing and warm compress., Historical Med      fluticasone (FLONASE) 50 mcg/act nasal spray 2 sprays into each nostril daily , Starting Fri 9/21/2018, Until Sat 9/21/2019, Historical Med      Mirabegron ER 25 MG TB24 Take 25 mg by mouth in the morning, Starting Wed 8/7/2024, Normal      ondansetron (ZOFRAN-ODT) 4 mg disintegrating tablet Take 1 tablet (4 mg total) by mouth every 6 (six) hours as needed for nausea or vomiting for up to 10  doses, Starting Mon 12/23/2024, Normal      prednisoLONE acetate (PRED FORTE) 1 % ophthalmic suspension PLEASE SEE ATTACHED FOR DETAILED DIRECTIONS, Historical Med      predniSONE 10 mg tablet if needed, Starting Sun 8/28/2022, Historical Med      tamsulosin (FLOMAX) 0.4 mg Take 0.4 mg by mouth daily with dinner, Starting Thu 11/2/2023, Until Fri 11/1/2024, Historical Med      trospium chloride (SANCTURA) 20 mg tablet Take 1 tablet (20 mg total) by mouth 2 (two) times a day, Starting Wed 8/7/2024, Normal      vitamin B-12 (VITAMIN B-12) 500 mcg tablet Take 500 mcg by mouth daily, Historical Med       !! - Potential duplicate medications found. Please discuss with provider.          ED SEPSIS DOCUMENTATION   Time reflects when diagnosis was documented in both MDM as applicable and the Disposition within this note       Time User Action Codes Description Comment    2/12/2025  1:11 AM Colin Hurt [K21.9] Gastric reflux     2/12/2025  1:12 AM Colin Hurt [R00.2] Palpitations     2/12/2025  1:12 AM Colin Hurt [F41.9] Anxiety                  Colin Hurt PA-C  02/12/25 0206

## 2025-02-12 NOTE — DISCHARGE INSTRUCTIONS
Take medication as prescribed  Follow-up with cardiology within 1 week for potential further evaluation of rapid heart rate/palpitations  Return to emergency room for increasing fever, aches, chills, chest pain, palpitations, shortness of breath, peripheral swelling, productive cough, or intractable nausea and vomiting.

## 2025-02-14 ENCOUNTER — OFFICE VISIT (OUTPATIENT)
Dept: NEPHROLOGY | Facility: CLINIC | Age: 40
End: 2025-02-14
Payer: COMMERCIAL

## 2025-02-14 VITALS
WEIGHT: 185 LBS | SYSTOLIC BLOOD PRESSURE: 110 MMHG | HEART RATE: 75 BPM | BODY MASS INDEX: 28.98 KG/M2 | DIASTOLIC BLOOD PRESSURE: 80 MMHG | OXYGEN SATURATION: 98 %

## 2025-02-14 DIAGNOSIS — N32.81 OVERACTIVE BLADDER: ICD-10-CM

## 2025-02-14 DIAGNOSIS — Q62.39 UPJ OBSTRUCTION, CONGENITAL: ICD-10-CM

## 2025-02-14 DIAGNOSIS — Q62.11 HYDRONEPHROSIS WITH URETEROPELVIC JUNCTION (UPJ) OBSTRUCTION: Chronic | ICD-10-CM

## 2025-02-14 DIAGNOSIS — N18.31 STAGE 3A CHRONIC KIDNEY DISEASE (HCC): Primary | ICD-10-CM

## 2025-02-14 PROCEDURE — G2211 COMPLEX E/M VISIT ADD ON: HCPCS | Performed by: INTERNAL MEDICINE

## 2025-02-14 PROCEDURE — 99214 OFFICE O/P EST MOD 30 MIN: CPT | Performed by: INTERNAL MEDICINE

## 2025-02-14 NOTE — PATIENT INSTRUCTIONS
As we discussed in the office visit after reviewing most recent blood test your kidney function remains stable with a more recent creatinine of 1.3.  Your blood pressure today is well-controlled.  Remember to stay well-hydrated.  Remember to avoid NSAIDs.  Stay physically active.  Follow low-salt diet less than 2 g sodium a day.  Advised to continue follow-up with primary care doctor and cardiologist and urologist.  I would like to see you back in the office in 1 year with repeat labs.

## 2025-02-14 NOTE — ASSESSMENT & PLAN NOTE
Patient with chronic severe right hydronephrosis and solitary left functional kidney.  Continue follow-up with urology

## 2025-02-14 NOTE — ASSESSMENT & PLAN NOTE
Lab Results   Component Value Date    EGFR 68 02/11/2025    EGFR 77 01/13/2025    EGFR 72 12/23/2024    CREATININE 1.32 (H) 02/11/2025    CREATININE 1.24 01/13/2025    CREATININE 1.26 12/23/2024       Orders:    CBC; Future    Renal function panel; Future    PTH, intact; Future    Protein / creatinine ratio, urine; Future  CKD stage II/IIIa with creatinine in the past fluctuating around 1.3 to 1.16 since 2009.  Recent labs were reviewed with patient and with his mother.  Kidney function is stable with a creatinine 1.32.  Blood pressure today well-controlled.  Stable hydrated.  Follow low-salt diet.  Avoid NSAIDs.  Follow-up in 1 year

## 2025-02-14 NOTE — PROGRESS NOTES
Name: Vinay Breen      : 1985      MRN: [id number]  Encounter Provider: Adria Franklin MD  Encounter Date: 2025   Encounter department: [facility] [place]  :  Assessment & Plan  Stage 3a chronic kidney disease (HCC)  Lab Results   Component Value Date    EGFR 68 2025    EGFR 77 2025    EGFR 72 2024    CREATININE 1.32 (H) 2025    CREATININE 1.24 2025    CREATININE 1.26 2024       Orders:    CBC; Future    Renal function panel; Future    PTH, intact; Future    Protein / creatinine ratio, urine; Future  CKD stage II/IIIa with creatinine in the past fluctuating around 1.3 to 1.16 since .  Recent labs were reviewed with patient and with his mother.  Kidney function is stable with a creatinine 1.32.  Blood pressure today well-controlled.  Stable hydrated.  Follow low-salt diet.  Avoid NSAIDs.  Follow-up in 1 year  UPJ obstruction, congenital       Previous MAG3 scan shows essentially nonfunctional right kidney  Overactive bladder       Continue follow-up with urology  Hydronephrosis with ureteropelvic junction (UPJ) obstruction       Patient with chronic severe right hydronephrosis and solitary left functional kidney.  Continue follow-up with urology    This is 39-year-old gentleman who returns to the office for follow-up of CKD stage II/IIIA in the setting of chronic severe right hydronephrosis and solitary functional left kidney.    History of Present Illness   HPI  Vinay Breen is a 39 y.o. male who presents for CKD follow-up with his mother.  Last time seeing on 2023.  Noted recently went to the emergency department a couple of times due to elevated blood pressure as well as episode of tachycardia  Was treated for possible GERD and anxiety.  He has an appointment with cardiology in 2 months.    In general is feeling okay, denies any chest pain, no shortness of breath, no leg swelling.  Denies any abdominal pain, no recent nausea,  no vomiting, no diarrhea or constipation.  Patient with urinary symptoms secondary to overactive bladder with frequency and sometimes leakage after urination, no dysuria, no gross hematuria.  Denies NSAID use.  Weight decreased 1 pound since last office visit    History obtained from: patient and mother    Review of Systems  ROS: All the systems were reviewed and were negative except as documented on the H&P.    Past Medical History:   Diagnosis Date    Chronic kidney disease     Hydronephrosis of right kidney     Renal failure        Medical History Reviewed by provider this encounter:  Tobacco  Allergies  Med Hx  Surg Hx  Fam Hx  Soc Hx    .  Current Outpatient Medications on File Prior to Visit   Medication Sig Dispense Refill    acetaminophen (TYLENOL) 325 mg tablet Take 650 mg by mouth every 6 (six) hours as needed for mild pain      albuterol (PROVENTIL HFA,VENTOLIN HFA) 90 mcg/act inhaler Inhale 2 puffs every 6 (six) hours as needed for wheezing      aluminum-magnesium hydroxide-simethicone (MAALOX) 200-200-20 MG/5ML SUSP Take 30 mL by mouth 4 (four) times a day (before meals and at bedtime) 3600 mL 0    Ascorbic Acid (vitamin C) 100 MG tablet Take 100 mg by mouth daily      cholecalciferol (VITAMIN D3) 1,000 units tablet Take 2,000 Units by mouth daily      erythromycin (ILOTYCIN) ophthalmic ointment Apply 1/2 inch ribbon to both lower eyelids three times a day for 7 days. Use after cleansing and warm compress.      famotidine (PEPCID) 20 mg tablet Take 1 tablet (20 mg total) by mouth 2 (two) times a day 30 tablet 0    hydrOXYzine HCL (ATARAX) 25 mg tablet Take 1 tablet (25 mg total) by mouth every 6 (six) hours 12 tablet 0    ondansetron (ZOFRAN-ODT) 4 mg disintegrating tablet Take 1 tablet (4 mg total) by mouth every 6 (six) hours as needed for nausea or vomiting for up to 10 doses 10 tablet 0    prednisoLONE acetate (PRED FORTE) 1 % ophthalmic suspension PLEASE SEE ATTACHED FOR DETAILED DIRECTIONS       vitamin B-12 (VITAMIN B-12) 500 mcg tablet Take 500 mcg by mouth daily      Cholecalciferol (VITAMIN D PO) Take 5,000 Units by mouth daily  (Patient not taking: Reported on 2/14/2025)      cyclobenzaprine (FLEXERIL) 5 mg tablet Take 1 tablet (5 mg total) by mouth 3 (three) times a day as needed for muscle spasms for up to 9 doses (Patient not taking: Reported on 10/24/2023) 9 tablet 0    fluticasone (FLONASE) 50 mcg/act nasal spray 2 sprays into each nostril daily  (Patient not taking: Reported on 2/14/2025)      Mirabegron ER 25 MG TB24 Take 25 mg by mouth in the morning (Patient not taking: Reported on 12/9/2024) 30 tablet 4    predniSONE 10 mg tablet if needed (Patient not taking: Reported on 11/17/2022)      tamsulosin (FLOMAX) 0.4 mg Take 0.4 mg by mouth daily with dinner (Patient not taking: Reported on 5/6/2024)      trospium chloride (SANCTURA) 20 mg tablet Take 1 tablet (20 mg total) by mouth 2 (two) times a day (Patient not taking: Reported on 12/9/2024) 60 tablet 6     No current facility-administered medications on file prior to visit.         Objective   /80 (BP Location: Left arm, Patient Position: Sitting, Cuff Size: Adult)   Pulse 75   Wt 83.9 kg (185 lb)   SpO2 98%   BMI 28.98 kg/m²      Physical Exam  General: conscious, cooperative, in not acute distress  Eyes: conjunctivae pink, anicteric sclerae  ENT: lips and mucous membranes moist  Neck: supple, no JVD  Chest: clear breath sounds bilateral, no crackles, ronchus or wheezings  CVS: distinct S1 & S2, normal rate, regular rhythm  Abdomen: soft, non-tender, non-distended, normoactive bowel sounds  Back: no CVA tenderness  Extremities: no edema of both legs  Skin: no rash  Neuro: awake, alert, oriented    Lab Results:   Results from last 7 days   Lab Units 02/11/25  2215   WBC Thousand/uL 10.42*   HEMOGLOBIN g/dL 14.3   HEMATOCRIT % 42.7   PLATELETS Thousands/uL 299   POTASSIUM mmol/L 4.0   CHLORIDE mmol/L 105   CO2 mmol/L 24   BUN  mg/dL 23   CREATININE mg/dL 1.32*   CALCIUM mg/dL 9.7   ALK PHOS U/L 68   ALT U/L 16   AST U/L 21       Laboratory Results:  Lab Results   Component Value Date    WBC 10.42 (H) 02/11/2025    HGB 14.3 02/11/2025    HCT 42.7 02/11/2025    MCV 90 02/11/2025     02/11/2025     Lab Results   Component Value Date    SODIUM 136 02/11/2025    K 4.0 02/11/2025     02/11/2025    CO2 24 02/11/2025    BUN 23 02/11/2025    CREATININE 1.32 (H) 02/11/2025    GLUC 102 02/11/2025    CALCIUM 9.7 02/11/2025     Lab Results   Component Value Date    PTH 36.5 11/11/2022    CALCIUM 9.7 02/11/2025    PHOS 3.1 11/11/2022

## 2025-02-18 ENCOUNTER — OFFICE VISIT (OUTPATIENT)
Dept: FAMILY MEDICINE CLINIC | Facility: CLINIC | Age: 40
End: 2025-02-18
Payer: COMMERCIAL

## 2025-02-18 VITALS
OXYGEN SATURATION: 98 % | TEMPERATURE: 98 F | SYSTOLIC BLOOD PRESSURE: 121 MMHG | HEART RATE: 82 BPM | DIASTOLIC BLOOD PRESSURE: 77 MMHG | BODY MASS INDEX: 28.82 KG/M2 | WEIGHT: 184 LBS

## 2025-02-18 DIAGNOSIS — J45.20 MILD INTERMITTENT ASTHMA WITHOUT COMPLICATION: ICD-10-CM

## 2025-02-18 DIAGNOSIS — K21.9 GASTROESOPHAGEAL REFLUX DISEASE, UNSPECIFIED WHETHER ESOPHAGITIS PRESENT: ICD-10-CM

## 2025-02-18 DIAGNOSIS — R03.0 ELEVATED BLOOD PRESSURE READING: ICD-10-CM

## 2025-02-18 DIAGNOSIS — K64.9 HEMORRHOIDS, UNSPECIFIED HEMORRHOID TYPE: ICD-10-CM

## 2025-02-18 DIAGNOSIS — E55.9 VITAMIN D DEFICIENCY: ICD-10-CM

## 2025-02-18 DIAGNOSIS — F40.10 SOCIAL ANXIETY DISORDER: ICD-10-CM

## 2025-02-18 DIAGNOSIS — R10.13 ABDOMINAL DISCOMFORT, EPIGASTRIC: ICD-10-CM

## 2025-02-18 DIAGNOSIS — I10 INTERMITTENT HYPERTENSION: ICD-10-CM

## 2025-02-18 DIAGNOSIS — R79.89 LOW VITAMIN D LEVEL: ICD-10-CM

## 2025-02-18 DIAGNOSIS — Z00.00 ANNUAL PHYSICAL EXAM: Primary | ICD-10-CM

## 2025-02-18 DIAGNOSIS — N18.2 STAGE 2 CHRONIC KIDNEY DISEASE: ICD-10-CM

## 2025-02-18 DIAGNOSIS — G47.33 MILD OBSTRUCTIVE SLEEP APNEA: ICD-10-CM

## 2025-02-18 PROBLEM — H01.02A SQUAMOUS BLEPHARITIS OF UPPER AND LOWER EYELIDS OF BOTH EYES: Status: ACTIVE | Noted: 2024-03-01

## 2025-02-18 PROBLEM — J06.9 URI, ACUTE: Status: ACTIVE | Noted: 2017-03-20

## 2025-02-18 PROBLEM — R26.81 GAIT INSTABILITY: Status: ACTIVE | Noted: 2017-03-25

## 2025-02-18 PROBLEM — H01.02B SQUAMOUS BLEPHARITIS OF UPPER AND LOWER EYELIDS OF BOTH EYES: Status: ACTIVE | Noted: 2024-03-01

## 2025-02-18 PROBLEM — G47.09 OTHER INSOMNIA: Status: ACTIVE | Noted: 2018-07-13

## 2025-02-18 PROBLEM — L57.8 SOLAR DEGENERATION: Status: ACTIVE | Noted: 2019-12-19

## 2025-02-18 PROBLEM — J06.9 URI, ACUTE: Status: RESOLVED | Noted: 2017-03-20 | Resolved: 2025-02-18

## 2025-02-18 PROBLEM — R41.3 MEMORY DIFFICULTY: Status: ACTIVE | Noted: 2023-07-31

## 2025-02-18 PROBLEM — F81.9 LEARNING DISABILITY: Status: ACTIVE | Noted: 2017-03-25

## 2025-02-18 PROBLEM — M62.81 MUSCLE WEAKNESS OF LOWER EXTREMITY: Status: ACTIVE | Noted: 2017-03-25

## 2025-02-18 PROCEDURE — 99214 OFFICE O/P EST MOD 30 MIN: CPT | Performed by: FAMILY MEDICINE

## 2025-02-18 PROCEDURE — G0439 PPPS, SUBSEQ VISIT: HCPCS | Performed by: FAMILY MEDICINE

## 2025-02-18 PROCEDURE — G2211 COMPLEX E/M VISIT ADD ON: HCPCS | Performed by: FAMILY MEDICINE

## 2025-02-18 RX ORDER — PANTOPRAZOLE SODIUM 20 MG/1
20 TABLET, DELAYED RELEASE ORAL DAILY
Qty: 30 TABLET | Refills: 2 | Status: SHIPPED | OUTPATIENT
Start: 2025-02-18

## 2025-02-18 RX ORDER — PROPRANOLOL HYDROCHLORIDE 10 MG/1
10 TABLET ORAL 3 TIMES DAILY PRN
Qty: 90 TABLET | Refills: 0 | Status: SHIPPED | OUTPATIENT
Start: 2025-02-18

## 2025-02-18 RX ORDER — SUCRALFATE 1 G/1
1 TABLET ORAL 3 TIMES DAILY
Qty: 90 TABLET | Refills: 0 | Status: SHIPPED | OUTPATIENT
Start: 2025-02-18

## 2025-02-18 RX ORDER — BLOOD PRESSURE TEST KIT-LARGE
KIT MISCELLANEOUS 2 TIMES DAILY
Qty: 1 EACH | Refills: 0 | Status: SHIPPED | OUTPATIENT
Start: 2025-02-18

## 2025-02-18 NOTE — ASSESSMENT & PLAN NOTE
- Has not been using albuterol inhaler in some time  - Denies any recent acute exacerbations, wheezing, nighttime coughing

## 2025-02-18 NOTE — ASSESSMENT & PLAN NOTE
- Sometimes gets up and states he can't breath; mother reports seems to be associated with drinking excess coffee  - Symptoms possibly related to suspected underlying reflux disease

## 2025-02-18 NOTE — ASSESSMENT & PLAN NOTE
-Current BP of 121/77 in office today  -Patient noting years of ongoing intermittent episodes of elevated blood pressure readings with associated symptoms of burning chest pain shortness of breath  - Patient also noting symptoms of waxing waning episodes of diarrhea and Osmar discomfort possibly associated  - Patient with also history of ongoing social anxiety disorder possibly attributing    Plan  - Patient advised to monitor blood pressure and record numbers for review  Orders:  •  Blood Pressure Monitoring ([employer] Advanced BP Monitor) LAKESHIA; Use 2 (two) times a day  •  Metanephrines Fractionated, urine, 24 hour; Future

## 2025-02-18 NOTE — PROGRESS NOTES
Name: Vinay Breen      : 1985      MRN: [id number]  Encounter Provider: Joe Hurt DO  Encounter Date: 2025   Encounter department: [facility]    Assessment & Plan  Annual physical exam  -Acute issues detailed below       Abdominal discomfort, epigastric  -Patient reports chronic ongoing intermittent burning sensation within his epigastrium as well as within his chest times  - Patient without any excessive alcohol intake, NSAID use  - Concerns this time for possible gastric ulcer    Plan  - Follow-up H. pylori breath test  - Once testing complete start PPI with Protonix 20 mg daily  - Start Carafate  - Follow-up in 1 month       Gastroesophageal reflux disease, unspecified whether esophagitis present  -As detailed above  Orders:  •  H. pylori breath test; Future  •  sucralfate (CARAFATE) 1 g tablet; Take 1 tablet (1 g total) by mouth 3 (three) times a day  •  Ambulatory Referral to Gastroenterology; Future  •  pantoprazole (PROTONIX) 20 mg tablet; Take 1 tablet (20 mg total) by mouth daily    Stage 2 chronic kidney disease  Lab Results   Component Value Date    EGFR 68 2025    EGFR 77 2025    EGFR 72 2024    CREATININE 1.32 (H) 2025    CREATININE 1.24 2025    CREATININE 1.26 2024   -Presently stable with some improvement in renal function going up from stage IIIa to stage II  - Patient currently continue staying well-hydrated and avoiding excessive NSAID use       Intermittent hypertension  -Current BP of 121/77 in office today  -Patient noting years of ongoing intermittent episodes of elevated blood pressure readings with associated symptoms of burning chest pain shortness of breath  - Patient also noting symptoms of waxing waning episodes of diarrhea and Osmar discomfort possibly associated  - Patient with also history of ongoing social anxiety disorder possibly attributing    Plan  - Patient advised to monitor blood  pressure and record numbers for review  Orders:  •  Blood Pressure Monitoring ([employer] Advanced BP Monitor) LAKESHIA; Use 2 (two) times a day  •  Metanephrines Fractionated, urine, 24 hour; Future    Elevated blood pressure reading  -Detailed above  Orders:  •  Ambulatory Referral to Family Practice  •  Blood Pressure Monitoring ([employer] Advanced BP Monitor) LAKESHIA; Use 2 (two) times a day    Social anxiety disorder  -Patient noting chronic ongoing severe anxiety when having engaged in social interactions especially large groups  - Patient recent started on Atarax to the hospital which she reports has been helping with anxiety but is requesting something will not make him tired    Orders:  •  Ambulatory referral to Psych Services; Future  •  propranolol (INDERAL) 10 mg tablet; Take 1 tablet (10 mg total) by mouth 3 (three) times a day as needed (Anxiety)    Hemorrhoids, unspecified hemorrhoid type  -Patient noting history of hemorrhoids as well as intermittent constipation  - Patient requesting follow-up GI at this time for evaluation  Orders:  •  Ambulatory Referral to Gastroenterology; Future    Mild intermittent asthma without complication  - Has not been using albuterol inhaler in some time  - Denies any recent acute exacerbations, wheezing, nighttime coughing       Mild obstructive sleep apnea  - Sometimes gets up and states he can't breath; mother reports seems to be associated with drinking excess coffee  - Symptoms possibly related to suspected underlying reflux disease          Preventive health issues were discussed with patient, and age appropriate screening tests were ordered as noted in patient's After Visit Summary. Personalized health advice and appropriate referrals for health education or preventive services given if needed, as noted in patient's After Visit Summary.    History of Present Illness     Patient presents for annual physical/establishment of care.  Patient noting he has been in the ER 2 times just  this past month due to elevations in blood pressure up to 160 systolic.  Patient notes onset of hypertension associated with shortness of breath, burning chest pain just under the sternum as well as epigastric pain.  Patient reports burning sensation can go up to his throat at times and states he feels like he is choking.  Patient notes abdominal discomfort is intermittent and depend upon how well he eats and how well-hydrated he is.  Patient notes symptoms have been present for some time.  Patient was reported to be struggling with anxiety predominantly in social gathering situations and may be contributing to elevations in blood pressure at times.        Patient Care Team:  Joe Hurt DO as PCP - General (Family Medicine)  Adria Franklin MD    Review of Systems   Constitutional:  Negative for activity change, appetite change and fatigue.   HENT:  Negative for congestion, postnasal drip and rhinorrhea.    Respiratory:  Positive for shortness of breath. Negative for cough and wheezing.    Cardiovascular:  Positive for chest pain and palpitations (sometimes).   Gastrointestinal:  Positive for abdominal pain and constipation. Negative for diarrhea, nausea and vomiting.   Musculoskeletal:  Positive for neck pain. Negative for arthralgias, back pain and myalgias.   Skin:  Negative for color change.   Neurological:  Negative for dizziness, light-headedness and headaches.   Psychiatric/Behavioral:  The patient is nervous/anxious.      Medical History Reviewed by provider this encounter:  Tobacco  Allergies  Meds  Problems  Med Hx  Surg Hx  Fam Hx       Annual Wellness Visit Questionnaire   Vinay is here for his Subsequent Wellness visit.     Health Risk Assessment:   Patient rates overall health as fair. Patient feels that their physical health rating is same. Patient is satisfied with their life. Eyesight was rated as same. Hearing was rated as same. Patient feels that their emotional and  mental health rating is same. Patients states they are never, rarely angry. Patient states they are often unusually tired/fatigued. Pain experienced in the last 7 days has been some. Patient's pain rating has been 6/10. Patient states that he has experienced no weight loss or gain in last 6 months.     Depression Screening:   PHQ-2 Score: 0      Fall Risk Screening:   In the past year, patient has experienced: no history of falling in past year      Home Safety:  Patient does not have trouble with stairs inside or outside of their home. Patient has working smoke alarms and has working carbon monoxide detector. Home safety hazards include: none.     Nutrition:   Current diet is Regular.     Medications:   Patient is currently taking over-the-counter supplements. OTC medications include: see medication list. Patient is able to manage medications.     Activities of Daily Living (ADLs)/Instrumental Activities of Daily Living (IADLs):   Walk and transfer into and out of bed and chair?: Yes  Dress and groom yourself?: Yes    Bathe or shower yourself?: Yes    Feed yourself? Yes  Do your laundry/housekeeping?: Yes  Manage your money, pay your bills and track your expenses?: Yes  Make your own meals?: Yes    Do your own shopping?: Yes    Previous Hospitalizations:   Any hospitalizations or ED visits within the last 12 months?: Yes    How many hospitalizations have you had in the last year?: 1-2    Advance Care Planning:   Living will: No    Durable POA for healthcare: No    Advanced directive: No      PREVENTIVE SCREENINGS      Cardiovascular Screening:    General: Screening Current      Diabetes Screening:     General: Screening Current      Prostate Cancer Screening:    General: Screening Not Indicated      Lung Cancer Screening:     General: Screening Not Indicated      Hepatitis C Screening:    General: Screening Current    Screening, Brief Intervention, and Referral to Treatment (SBIRT)     Screening      AUDIT-C  Screenin) How often did you have a drink containing alcohol in the past year? never  2) How many drinks did you have on a typical day when you were drinking in the past year? 0  3) How often did you have 6 or more drinks on one occasion in the past year? never    AUDIT-C Score: 0  Interpretation: Score 0-3 (male): Negative screen for alcohol misuse    Single Item Drug Screening:  How often have you used an illegal drug (including marijuana) or a prescription medication for non-medical reasons in the past year? never    Single Item Drug Screen Score: 0  Interpretation: Negative screen for possible drug use disorder    Social Drivers of Health     Food Insecurity: No Food Insecurity (2025)    Hunger Vital Sign    • Worried About Running Out of Food in the Last Year: Never true    • Ran Out of Food in the Last Year: Never true   Transportation Needs: No Transportation Needs (2025)    PRAPARE - Transportation    • Lack of Transportation (Medical): No    • Lack of Transportation (Non-Medical): No   Housing Stability: Low Risk  (2025)    Housing Stability Vital Sign    • Unable to Pay for Housing in the Last Year: No    • Number of Times Moved in the Last Year: 1    • Homeless in the Last Year: No   Utilities: Not At Risk (2025)    [facility] Utilities    • Threatened with loss of utilities: No     No results found.    Objective   /77 (BP Location: Left arm, Patient Position: Sitting, Cuff Size: Standard)   Pulse 82   Temp 98 °F (36.7 °C)   Wt 83.5 kg (184 lb)   SpO2 98%   BMI 28.82 kg/m²     Physical Exam  Vitals and nursing note reviewed.   Constitutional:       General: He is not in acute distress.     Appearance: Normal appearance. He is well-developed. He is not ill-appearing.   HENT:      Head: Normocephalic and atraumatic.      Right Ear: External ear normal.      Left Ear: External ear normal.      Nose: Nose normal.   Eyes:      Conjunctiva/sclera: Conjunctivae normal.    Cardiovascular:      Rate and Rhythm: Normal rate and regular rhythm.      Pulses: Normal pulses.      Heart sounds: Normal heart sounds. No murmur heard.  Pulmonary:      Effort: Pulmonary effort is normal. No respiratory distress.      Breath sounds: Normal breath sounds.   Abdominal:      Palpations: Abdomen is soft.      Tenderness: There is no abdominal tenderness.   Musculoskeletal:         General: No swelling.      Cervical back: Neck supple.      Left lower leg: No edema.   Skin:     General: Skin is warm and dry.      Capillary Refill: Capillary refill takes less than 2 seconds.   Neurological:      Mental Status: He is alert.   Psychiatric:         Mood and Affect: Mood normal.

## 2025-02-18 NOTE — ASSESSMENT & PLAN NOTE
-Patient noting history of hemorrhoids as well as intermittent constipation  - Patient requesting follow-up GI at this time for evaluation  Orders:  •  Ambulatory Referral to Gastroenterology; Future

## 2025-02-18 NOTE — ASSESSMENT & PLAN NOTE
Lab Results   Component Value Date    EGFR 68 02/11/2025    EGFR 77 01/13/2025    EGFR 72 12/23/2024    CREATININE 1.32 (H) 02/11/2025    CREATININE 1.24 01/13/2025    CREATININE 1.26 12/23/2024   -Presently stable with some improvement in renal function going up from stage IIIa to stage II  - Patient currently continue staying well-hydrated and avoiding excessive NSAID use

## 2025-02-19 ENCOUNTER — TELEPHONE (OUTPATIENT)
Age: 40
End: 2025-02-19

## 2025-02-19 NOTE — TELEPHONE ENCOUNTER
Patient called stating he has some concerns in regards to the medications that were prescribed at his visit yesterday. He would like a phone call from Dr Hurt to clarify that they're safe to take with his sulfa allergy. Please advise

## 2025-02-24 ENCOUNTER — PATIENT MESSAGE (OUTPATIENT)
Age: 40
End: 2025-02-24

## 2025-02-25 DIAGNOSIS — N32.81 OVERACTIVE BLADDER: ICD-10-CM

## 2025-02-25 RX ORDER — MIRABEGRON 25 MG/1
25 TABLET, FILM COATED, EXTENDED RELEASE ORAL DAILY
Qty: 30 TABLET | Refills: 4 | Status: SHIPPED | OUTPATIENT
Start: 2025-02-25

## 2025-03-05 ENCOUNTER — TELEPHONE (OUTPATIENT)
Age: 40
End: 2025-03-05

## 2025-03-05 NOTE — TELEPHONE ENCOUNTER
Contacted patient in regards to ROUTINE Referral, LVM to contact [phone] to discuss services needed at this time in order to be added to proper wait list.    Please add patient to proper WL(s).

## 2025-03-12 DIAGNOSIS — K21.9 GASTROESOPHAGEAL REFLUX DISEASE, UNSPECIFIED WHETHER ESOPHAGITIS PRESENT: ICD-10-CM

## 2025-03-12 DIAGNOSIS — F40.10 SOCIAL ANXIETY DISORDER: ICD-10-CM

## 2025-03-14 RX ORDER — PROPRANOLOL HYDROCHLORIDE 10 MG/1
TABLET ORAL
Qty: 270 TABLET | Refills: 1 | Status: SHIPPED | OUTPATIENT
Start: 2025-03-14

## 2025-03-14 RX ORDER — SUCRALFATE 1 G/1
1 TABLET ORAL 3 TIMES DAILY
Qty: 270 TABLET | Refills: 1 | Status: SHIPPED | OUTPATIENT
Start: 2025-03-14

## 2025-04-08 ENCOUNTER — OFFICE VISIT (OUTPATIENT)
Dept: CARDIOLOGY CLINIC | Facility: CLINIC | Age: 40
End: 2025-04-08
Payer: COMMERCIAL

## 2025-04-08 VITALS
DIASTOLIC BLOOD PRESSURE: 100 MMHG | HEIGHT: 67 IN | WEIGHT: 177.3 LBS | HEART RATE: 76 BPM | SYSTOLIC BLOOD PRESSURE: 126 MMHG | BODY MASS INDEX: 27.83 KG/M2

## 2025-04-08 DIAGNOSIS — I10 INTERMITTENT HYPERTENSION: Primary | ICD-10-CM

## 2025-04-08 DIAGNOSIS — N18.2 CHRONIC KIDNEY DISEASE, STAGE 2 (MILD): ICD-10-CM

## 2025-04-08 DIAGNOSIS — R00.2 PALPITATIONS: ICD-10-CM

## 2025-04-08 PROCEDURE — 99203 OFFICE O/P NEW LOW 30 MIN: CPT | Performed by: INTERNAL MEDICINE

## 2025-04-08 NOTE — PROGRESS NOTES
Cardiology Follow Up    Vinay Breen  1985  [id number]  [facility] [place]  [address] [facility]  [place] [id number] [phone]    1. Intermittent hypertension  2. Palpitations  -     Ambulatory Referral to Cardiology  -     Echo complete w/ contrast if indicated; Future; Expected date: 04/22/2025  -     Stress test only, exercise; Future; Expected date: 04/22/2025  3. Chronic kidney disease, stage 2 (mild)  -     Stress test only, exercise; Future; Expected date: 04/22/2025      Discussion: The symptoms do not suggest serious cardiac pathology.  However, an echocardiogram was ordered to identify any significant structural heart disease and treadmill test was ordered to assess his rhythm during and after exercise.  I will see him in follow-up in 2 months.    Cardiovascular History:   Mr. Breen was seen in the ED in 2/25 because of the sensation of rapid heart rate after exercise.  No specific abnormalities were identified.  He has had similar symptoms subsequently.  There is no chest discomfort or symptoms suggestive of heart failure.  There is no history of syncope.  At the time of his initial assessment, an echocardiogram was ordered to identify any significant structural heart disease and treadmill test was ordered to assess his rhythm during and after exercise.    Patient Active Problem List   Diagnosis    Hydronephrosis, right    Stage 2 chronic kidney disease    UPJ obstruction, congenital    Post-void dribbling    Overactive bladder    Gait instability    Hemorrhoids    Intermittent hypertension    Learning disability    Memory difficulty    Mild intermittent asthma without complication    Mild obstructive sleep apnea    Muscle weakness of lower extremity    Other insomnia    Renal agenesis and dysgenesis    Solar degeneration    Squamous blepharitis of upper and lower eyelids of both eyes    Vitamin D deficiency     Past  Medical History:   Diagnosis Date    Chronic kidney disease     HTN (hypertension), benign 09/18/2015    Hydronephrosis of right kidney     Renal failure     URI, acute 03/20/2017     Social History     Socioeconomic History    Marital status: Single     Spouse name: Not on file    Number of children: Not on file    Years of education: Not on file    Highest education level: Not on file   Occupational History    Not on file   Tobacco Use    Smoking status: Never    Smokeless tobacco: Never   Vaping Use    Vaping status: Never Used   Substance and Sexual Activity    Alcohol use: No    Drug use: No    Sexual activity: Not Currently   Other Topics Concern    Not on file   Social History Narrative    Not on file     Social Drivers of Health     Financial Resource Strain: Not At Risk (3/11/2025)    Received from [facility]    Financial Insecurity     In the last 12 months did you skip medications to save money?: No     In the last 12 months was there a time when you needed to see a doctor but could not because of cost?: No   Food Insecurity: No Food Insecurity (3/11/2025)    Received from [facility]    Food Insecurity     In the last 12 months did you ever eat less than you felt you should because there wasn't enough money for food?: No   Transportation Needs: No Transportation Needs (3/11/2025)    Received from [facility]    Transportation Needs     In the last 12 months have you ever had to go without healthcare because you didn't have a way to get there?: No   Physical Activity: Not on file   Stress: Not on file   Social Connections: Socially Integrated (3/11/2025)    Received from [facility]    Social Connection     Do you often feel lonely?: No   Intimate Partner Violence: Not on file   Housing Stability: Not At Risk (3/11/2025)    Received from [facility]    Housing Stability     Are you worried that in the next 2 months you  may not have stable housing?: No      Family History   Problem Relation Age of Onset    Stroke Father     Diabetes Father     Kidney disease Paternal Aunt     Kidney disease Paternal Uncle     Kidney disease Paternal Grandfather     Cancer Maternal Grandmother     Heart disease Maternal Grandmother      Past Surgical History:   Procedure Laterality Date    CYSTOSCOPY  05/21/2024    Dr. Palacios    TONSILLECTOMY AND ADENOIDECTOMY         Current Outpatient Medications:     acetaminophen (TYLENOL) 325 mg tablet, Take 650 mg by mouth every 6 (six) hours as needed for mild pain, Disp: , Rfl:     albuterol (PROVENTIL HFA,VENTOLIN HFA) 90 mcg/act inhaler, Inhale 2 puffs every 6 (six) hours as needed for wheezing, Disp: , Rfl:     aluminum-magnesium hydroxide-simethicone (MAALOX) 200-200-20 MG/5ML SUSP, Take 30 mL by mouth 4 (four) times a day (before meals and at bedtime), Disp: 3600 mL, Rfl: 0    Ascorbic Acid (vitamin C) 100 MG tablet, Take 100 mg by mouth daily, Disp: , Rfl:     Blood Pressure Monitoring ([employer] Advanced BP Monitor) LAKESHIA, Use 2 (two) times a day, Disp: 1 each, Rfl: 0    cholecalciferol (VITAMIN D3) 1,000 units tablet, Take 2,000 Units by mouth daily, Disp: , Rfl:     erythromycin (ILOTYCIN) ophthalmic ointment, Apply 1/2 inch ribbon to both lower eyelids three times a day for 7 days. Use after cleansing and warm compress., Disp: , Rfl:     famotidine (PEPCID) 20 mg tablet, Take 1 tablet (20 mg total) by mouth 2 (two) times a day, Disp: 30 tablet, Rfl: 0    hydrOXYzine HCL (ATARAX) 25 mg tablet, Take 1 tablet (25 mg total) by mouth every 6 (six) hours, Disp: 12 tablet, Rfl: 0    Mirabegron ER 25 MG TB24, Take 25 mg by mouth in the morning, Disp: 30 tablet, Rfl: 4    ondansetron (ZOFRAN-ODT) 4 mg disintegrating tablet, Take 1 tablet (4 mg total) by mouth every 6 (six) hours as needed for nausea or vomiting for up to 10 doses, Disp: 10 tablet, Rfl: 0    pantoprazole (PROTONIX) 20 mg tablet, Take 1 tablet (20  mg total) by mouth daily, Disp: 30 tablet, Rfl: 2    prednisoLONE acetate (PRED FORTE) 1 % ophthalmic suspension, PLEASE SEE ATTACHED FOR DETAILED DIRECTIONS, Disp: , Rfl:     propranolol (INDERAL) 10 mg tablet, TAKE 1 TABLET BY MOUTH 3 TIMES A DAY AS NEEDED (ANXIETY)., Disp: 270 tablet, Rfl: 1    sucralfate (CARAFATE) 1 g tablet, TAKE 1 TABLET BY MOUTH 3 TIMES A DAY., Disp: 270 tablet, Rfl: 1    vitamin B-12 (VITAMIN B-12) 500 mcg tablet, Take 500 mcg by mouth daily, Disp: , Rfl:     Cholecalciferol (VITAMIN D PO), Take 5,000 Units by mouth daily  (Patient not taking: Reported on 2/14/2025), Disp: , Rfl:     cyclobenzaprine (FLEXERIL) 5 mg tablet, Take 1 tablet (5 mg total) by mouth 3 (three) times a day as needed for muscle spasms for up to 9 doses (Patient not taking: Reported on 10/24/2023), Disp: 9 tablet, Rfl: 0    fluticasone (FLONASE) 50 mcg/act nasal spray, 2 sprays into each nostril daily  (Patient not taking: Reported on 2/14/2025), Disp: , Rfl:     predniSONE 10 mg tablet, if needed (Patient not taking: Reported on 11/17/2022), Disp: , Rfl:     tamsulosin (FLOMAX) 0.4 mg, Take 0.4 mg by mouth daily with dinner (Patient not taking: Reported on 5/6/2024), Disp: , Rfl:     trospium chloride (SANCTURA) 20 mg tablet, Take 1 tablet (20 mg total) by mouth 2 (two) times a day (Patient not taking: Reported on 12/9/2024), Disp: 60 tablet, Rfl: 6  Allergies   Allergen Reactions    Sulfa Antibiotics Hives       Labs: personally reviewed all pertinent labs  Imaging:  personally reviewed all pertinent imaging  Cath:  ECHO:  Stress:  Holter:    Review of Systems:  Review of Systems   Constitutional: Negative.   HENT: Negative.     Eyes: Negative.    Cardiovascular: Negative.    Respiratory: Negative.     Endocrine: Negative.    Hematologic/Lymphatic: Negative.    Skin: Negative.    Musculoskeletal: Negative.    Gastrointestinal: Negative.    Genitourinary: Negative.    Neurological: Negative.    Psychiatric/Behavioral:  Negative.     Allergic/Immunologic: Negative.    All other systems reviewed and are negative.      Vitals:    04/08/25 1319   BP: 126/100   Pulse: 76     Weight (last 2 days)       Date/Time Weight    04/08/25 1319 80.4 (177.3)            Physical Exam:  Physical Exam  Vitals reviewed.   Constitutional:       General: He is not in acute distress.     Appearance: He is well-developed. He is not diaphoretic.   HENT:      Head: Normocephalic and atraumatic.   Eyes:      General: No scleral icterus.     Conjunctiva/sclera: Conjunctivae normal.   Neck:      Vascular: No JVD.      Trachea: No tracheal deviation.   Cardiovascular:      Rate and Rhythm: Normal rate and regular rhythm.      Pulses: Intact distal pulses.      Heart sounds: Normal heart sounds. No murmur heard.     No friction rub. No gallop.   Pulmonary:      Effort: Pulmonary effort is normal. No respiratory distress.      Breath sounds: Normal breath sounds. No stridor. No wheezing or rales.   Chest:      Chest wall: No tenderness.   Abdominal:      General: Bowel sounds are normal. There is no distension.      Palpations: Abdomen is soft.      Tenderness: There is no abdominal tenderness.   Musculoskeletal:         General: No tenderness. Normal range of motion.      Cervical back: Normal range of motion and neck supple.   Skin:     General: Skin is warm and dry.      Findings: No erythema.   Neurological:      Mental Status: He is alert and oriented to person, place, and time.      Cranial Nerves: No cranial nerve deficit.      Coordination: Coordination normal.   Psychiatric:         Behavior: Behavior normal.         Thought Content: Thought content normal.         Judgment: Judgment normal.         Naresh Aguero MD

## 2025-04-09 ENCOUNTER — NURSE TRIAGE (OUTPATIENT)
Dept: UROLOGY | Facility: AMBULATORY SURGERY CENTER | Age: 40
End: 2025-04-09

## 2025-04-10 NOTE — PATIENT COMMUNICATION
"Patient sent in a Aud[employer] message stating he is hearing a sizzling noise from the toilet after he urinate. Denies any fever, pain, or any other urinary symptoms. He is unsure what this could indicate. Please advise.      1. SYMPTOM: \"What's the main symptom you're concerned about?\" (e.g., frequency, incontinence)      Sizzling urine    2. ONSET: \"When did the symptoms start?\"      Has been happening for some time    3. PAIN: \"Is there any pain?\" If Yes, ask: \"How bad is it?\" (Scale: 1-10; mild, moderate, severe)      Denies    4. CAUSE: \"What do you think is causing the symptoms?\"      Unsure    5. OTHER SYMPTOMS: \"Do you have any other symptoms?\" (e.g., blood in urine, fever, flank pain, pain with urination)      Denies  "

## 2025-04-10 NOTE — TELEPHONE ENCOUNTER
"Reason for Disposition  • All other urine symptoms    Answer Assessment - Initial Assessment Questions  1. SYMPTOM: \"What's the main symptom you're concerned about?\" (e.g., frequency, incontinence)      Sizzling urine    2. ONSET: \"When did the symptoms start?\"      Has been happening for some time    3. PAIN: \"Is there any pain?\" If Yes, ask: \"How bad is it?\" (Scale: 1-10; mild, moderate, severe)      Denies    4. CAUSE: \"What do you think is causing the symptoms?\"      Unsure    5. OTHER SYMPTOMS: \"Do you have any other symptoms?\" (e.g., blood in urine, fever, flank pain, pain with urination)      Denies    Protocols used: Urinary Symptoms-Adult-[place]    "

## 2025-05-07 NOTE — ASSESSMENT & PLAN NOTE
Underwent cystoscopy in May 2024 by Dr. Palacios which was unremarkable and benign with no concern for urethral stricture or narrowing  Currently managed on 25 mg of Myrbetriq with good benefit in his urinary symptoms  Referral placed for pelvic floor physical therapy at last office visit, has not attended any sessions to date, we discussed that if his symptoms return or continue despite being on the Myrbetriq it would be beneficial for further workup by pelvic floor physical therapy  Follow up in 6 months     Orders:    Mirabegron ER 25 MG TB24; Take 25 mg by mouth in the morning

## 2025-05-07 NOTE — PROGRESS NOTES
Name: Vinay Breen      : 1985      MRN: [id number]  Encounter Provider: SORAYA Aguirre  Encounter Date: 2025   Encounter department: [facility] [place]  :  Assessment & Plan  UPJ obstruction, congenital  MAG3 renal scan with Lasix shows 99% split function on the left side  Right kidney is essentially atrophic, was discussed with patient at last office visit with Dr. Palacios that as long as he remains asymptomatic without infections no additional surgical intervention is required       Overactive bladder  Underwent cystoscopy in May 2024 by Dr. Palacios which was unremarkable and benign with no concern for urethral stricture or narrowing  Currently managed on 25 mg of Myrbetriq with good benefit in his urinary symptoms  Referral placed for pelvic floor physical therapy at last office visit, has not attended any sessions to date, we discussed that if his symptoms return or continue despite being on the Myrbetriq it would be beneficial for further workup by pelvic floor physical therapy  Follow up in 6 months     Orders:    Mirabegron ER 25 MG TB24; Take 25 mg by mouth in the morning        History of Present Illness   Vinay Breen is a 39 y.o. male who presents today to the office for follow-up of congenital UPJ obstruction and overactive bladder.  He was last seen in 2024.  He is known to Dr. Palacios.    At his last office visit he was continuing with bothersome urinary symptoms in which she tried Myrbetriq for but noticed fatigue so then he discontinued the medication.  He stated that he would like to retry the medication to see if it would be effective.    Today in the office he states that he is overall doing very well.  He states that he has seen improvement of his urinary symptoms since being on the Myrbetriq consistently.  He reports that his urgency, frequency and occasional incontinence has significantly improved.    HPI 2024:  He underwent a  cystoscopy to make sure that he did not have any underlying urethral stricture or abnormality that was contributing to his postvoid dribbling.  His cystoscopy was overall unremarkable and benign.  He was started on oxybutynin extended release 10 mg daily to help with bothersome urinary symptoms.  He had bothersome complaints after initiating the oxybutynin with extreme drowsiness and he wished to trial a different medication.  Trospium 20 mg twice daily was called into his pharmacy but he has not been able to obtain it due to insurance purposes yet.  I did discuss with him that we are currently working on this with the prior authorization team as well as provided a letter for medical necessity to fax to his insurance.     He also has history of UPJ obstruction with an atrophic right kidney.  He had a MAG3 Lasix renal scan which demonstrated a 99% split function on the left side.  The right kidney is essentially atrophic.  It was discussed with the patient and his mom that as long as he remains asymptomatic and without infections there is no need for any additional surgical intervention.    Review of Systems   Constitutional:  Negative for chills and fever.   Respiratory: Negative.  Negative for cough and shortness of breath.    Cardiovascular: Negative.  Negative for chest pain.   Gastrointestinal: Negative.  Negative for abdominal distention, abdominal pain, nausea and vomiting.   Genitourinary:  Negative for decreased urine volume, difficulty urinating, dysuria, flank pain, frequency, hematuria, penile discharge, penile pain, penile swelling, scrotal swelling, testicular pain and urgency.   Skin: Negative.  Negative for rash.   Neurological: Negative.    Hematological:  Negative for adenopathy. Does not bruise/bleed easily.          Objective   There were no vitals taken for this visit.    Physical Exam  Vitals reviewed.   Constitutional:       Appearance: Normal appearance.   HENT:      Head: Normocephalic and  "atraumatic.   Eyes:      Pupils: Pupils are equal, round, and reactive to light.   Cardiovascular:      Rate and Rhythm: Normal rate.   Pulmonary:      Effort: Pulmonary effort is normal.   Abdominal:      General: Abdomen is flat.      Palpations: Abdomen is soft.   Musculoskeletal:      Cervical back: Normal range of motion.   Skin:     General: Skin is warm and dry.   Neurological:      General: No focal deficit present.      Mental Status: He is alert and oriented to person, place, and time.   Psychiatric:         Mood and Affect: Mood normal.         Behavior: Behavior normal.         Thought Content: Thought content normal.         Judgment: Judgment normal.           Results   No results found for: \"PSA\"  Lab Results   Component Value Date    GLUCOSE 87 08/01/2015    CALCIUM 9.7 02/11/2025     08/01/2015    K 4.0 02/11/2025    CO2 24 02/11/2025     02/11/2025    BUN 23 02/11/2025    CREATININE 1.32 (H) 02/11/2025     Lab Results   Component Value Date    WBC 10.42 (H) 02/11/2025    HGB 14.3 02/11/2025    HCT 42.7 02/11/2025    MCV 90 02/11/2025     02/11/2025       Office Urine Dip  No results found for this or any previous visit (from the past hour).      "

## 2025-05-09 ENCOUNTER — OFFICE VISIT (OUTPATIENT)
Dept: CARDIOLOGY CLINIC | Facility: CLINIC | Age: 40
End: 2025-05-09
Payer: COMMERCIAL

## 2025-05-09 ENCOUNTER — OFFICE VISIT (OUTPATIENT)
Dept: UROLOGY | Facility: AMBULATORY SURGERY CENTER | Age: 40
End: 2025-05-09
Payer: COMMERCIAL

## 2025-05-09 VITALS
HEIGHT: 67 IN | BODY MASS INDEX: 27.06 KG/M2 | SYSTOLIC BLOOD PRESSURE: 104 MMHG | DIASTOLIC BLOOD PRESSURE: 68 MMHG | HEART RATE: 81 BPM | WEIGHT: 172.4 LBS

## 2025-05-09 VITALS
SYSTOLIC BLOOD PRESSURE: 126 MMHG | BODY MASS INDEX: 26.84 KG/M2 | WEIGHT: 171 LBS | HEIGHT: 67 IN | DIASTOLIC BLOOD PRESSURE: 80 MMHG | HEART RATE: 84 BPM | OXYGEN SATURATION: 99 %

## 2025-05-09 DIAGNOSIS — Q62.39 UPJ OBSTRUCTION, CONGENITAL: Primary | ICD-10-CM

## 2025-05-09 DIAGNOSIS — I10 INTERMITTENT HYPERTENSION: ICD-10-CM

## 2025-05-09 DIAGNOSIS — N32.81 OVERACTIVE BLADDER: ICD-10-CM

## 2025-05-09 DIAGNOSIS — R26.81 GAIT INSTABILITY: Primary | ICD-10-CM

## 2025-05-09 PROCEDURE — 99214 OFFICE O/P EST MOD 30 MIN: CPT | Performed by: INTERNAL MEDICINE

## 2025-05-09 PROCEDURE — 99213 OFFICE O/P EST LOW 20 MIN: CPT

## 2025-05-09 RX ORDER — MIRABEGRON 25 MG/1
25 TABLET, FILM COATED, EXTENDED RELEASE ORAL DAILY
Qty: 90 TABLET | Refills: 3 | Status: SHIPPED | OUTPATIENT
Start: 2025-05-09

## 2025-05-09 NOTE — PROGRESS NOTES
Cardiology Follow Up    Vinay Breen  1985  [id number]  [facility] [place]  [address] [facility]  [place] [id number] [phone]    1. Gait instability  2. Intermittent hypertension      Discussion: His stress test and echo are scheduled for Monday. If they are abnormal, we will see him in follow-up. Otherwise, he can return prn.     Cardiovascular History:   Mr. Breen was seen in the ED in 2/25 because of the sensation of rapid heart rate after exercise.  No specific abnormalities were identified.  He has had similar symptoms subsequently.  There is no chest discomfort or symptoms suggestive of heart failure.  There is no history of syncope.  At the time of his initial assessment, an echocardiogram was ordered to identify any significant structural heart disease and treadmill test was ordered to assess his rhythm during and after exercise.    Patient Active Problem List   Diagnosis    Hydronephrosis, right    Stage 2 chronic kidney disease    UPJ obstruction, congenital    Post-void dribbling    Overactive bladder    Gait instability    Hemorrhoids    Intermittent hypertension    Learning disability    Memory difficulty    Mild intermittent asthma without complication    Mild obstructive sleep apnea    Muscle weakness of lower extremity    Other insomnia    Renal agenesis and dysgenesis    Solar degeneration    Squamous blepharitis of upper and lower eyelids of both eyes    Vitamin D deficiency     Past Medical History:   Diagnosis Date    Chronic kidney disease     HTN (hypertension), benign 09/18/2015    Hydronephrosis of right kidney     Renal failure     URI, acute 03/20/2017     Social History     Socioeconomic History    Marital status: Single     Spouse name: Not on file    Number of children: Not on file    Years of education: Not on file    Highest education level: Not on file   Occupational History    Not on file   Tobacco  Use    Smoking status: Never    Smokeless tobacco: Never   Vaping Use    Vaping status: Never Used   Substance and Sexual Activity    Alcohol use: No    Drug use: No    Sexual activity: Not Currently   Other Topics Concern    Not on file   Social History Narrative    Not on file     Social Drivers of Health     Financial Resource Strain: Not At Risk (3/11/2025)    Received from [facility]    Financial Insecurity     In the last 12 months did you skip medications to save money?: No     In the last 12 months was there a time when you needed to see a doctor but could not because of cost?: No   Food Insecurity: No Food Insecurity (3/11/2025)    Received from [facility]    Food Insecurity     In the last 12 months did you ever eat less than you felt you should because there wasn't enough money for food?: No   Transportation Needs: No Transportation Needs (3/11/2025)    Received from [facility]    Transportation Needs     In the last 12 months have you ever had to go without healthcare because you didn't have a way to get there?: No   Physical Activity: Not on file   Stress: Not on file   Social Connections: Socially Integrated (3/11/2025)    Received from [facility]    Social Connection     Do you often feel lonely?: No   Intimate Partner Violence: Not on file   Housing Stability: Not At Risk (3/11/2025)    Received from [facility]    Housing Stability     Are you worried that in the next 2 months you may not have stable housing?: No      Family History   Problem Relation Age of Onset    Stroke Father     Diabetes Father     Kidney disease Paternal Aunt     Kidney disease Paternal Uncle     Kidney disease Paternal Grandfather     Cancer Maternal Grandmother     Heart disease Maternal Grandmother      Past Surgical History:   Procedure Laterality Date    CYSTOSCOPY  05/21/2024    Dr. Palacios    TONSILLECTOMY AND ADENOIDECTOMY          Current Outpatient Medications:     acetaminophen (TYLENOL) 325 mg tablet, Take 650 mg by mouth every 6 (six) hours as needed for mild pain, Disp: , Rfl:     albuterol (PROVENTIL HFA,VENTOLIN HFA) 90 mcg/act inhaler, Inhale 2 puffs every 6 (six) hours as needed for wheezing, Disp: , Rfl:     aluminum-magnesium hydroxide-simethicone (MAALOX) 200-200-20 MG/5ML SUSP, Take 30 mL by mouth 4 (four) times a day (before meals and at bedtime), Disp: 3600 mL, Rfl: 0    Ascorbic Acid (vitamin C) 100 MG tablet, Take 100 mg by mouth daily, Disp: , Rfl:     Blood Pressure Monitoring ([employer] Advanced BP Monitor) LAKESHIA, Use 2 (two) times a day, Disp: 1 each, Rfl: 0    Cholecalciferol (VITAMIN D PO), Take 5,000 Units by mouth daily, Disp: , Rfl:     cholecalciferol (VITAMIN D3) 1,000 units tablet, Take 2,000 Units by mouth daily, Disp: , Rfl:     erythromycin (ILOTYCIN) ophthalmic ointment, Apply 1/2 inch ribbon to both lower eyelids three times a day for 7 days. Use after cleansing and warm compress., Disp: , Rfl:     famotidine (PEPCID) 20 mg tablet, Take 1 tablet (20 mg total) by mouth 2 (two) times a day, Disp: 30 tablet, Rfl: 0    hydrOXYzine HCL (ATARAX) 25 mg tablet, Take 1 tablet (25 mg total) by mouth every 6 (six) hours, Disp: 12 tablet, Rfl: 0    Mirabegron ER 25 MG TB24, Take 25 mg by mouth in the morning, Disp: 90 tablet, Rfl: 3    ondansetron (ZOFRAN-ODT) 4 mg disintegrating tablet, Take 1 tablet (4 mg total) by mouth every 6 (six) hours as needed for nausea or vomiting for up to 10 doses, Disp: 10 tablet, Rfl: 0    pantoprazole (PROTONIX) 20 mg tablet, Take 1 tablet (20 mg total) by mouth daily, Disp: 30 tablet, Rfl: 2    prednisoLONE acetate (PRED FORTE) 1 % ophthalmic suspension, PLEASE SEE ATTACHED FOR DETAILED DIRECTIONS, Disp: , Rfl:     propranolol (INDERAL) 10 mg tablet, TAKE 1 TABLET BY MOUTH 3 TIMES A DAY AS NEEDED (ANXIETY)., Disp: 270 tablet, Rfl: 1    sucralfate (CARAFATE) 1 g tablet, TAKE 1 TABLET BY  MOUTH 3 TIMES A DAY., Disp: 270 tablet, Rfl: 1    vitamin B-12 (VITAMIN B-12) 500 mcg tablet, Take 500 mcg by mouth daily, Disp: , Rfl:     cyclobenzaprine (FLEXERIL) 5 mg tablet, Take 1 tablet (5 mg total) by mouth 3 (three) times a day as needed for muscle spasms for up to 9 doses (Patient not taking: Reported on 10/24/2023), Disp: 9 tablet, Rfl: 0    fluticasone (FLONASE) 50 mcg/act nasal spray, 2 sprays into each nostril daily  (Patient not taking: Reported on 2/14/2025), Disp: , Rfl:     predniSONE 10 mg tablet, if needed (Patient not taking: Reported on 11/17/2022), Disp: , Rfl:   Allergies   Allergen Reactions    Sulfa Antibiotics Hives       Labs: personally reviewed all pertinent labs  Imaging:  personally reviewed all pertinent imaging  Cath:  ECHO:  Stress:  Holter:    Review of Systems:  Review of Systems   Constitutional: Negative.   HENT: Negative.     Eyes: Negative.    Cardiovascular: Negative.    Respiratory: Negative.     Endocrine: Negative.    Hematologic/Lymphatic: Negative.    Skin: Negative.    Musculoskeletal: Negative.    Gastrointestinal: Negative.    Genitourinary: Negative.    Neurological: Negative.    Psychiatric/Behavioral: Negative.     Allergic/Immunologic: Negative.    All other systems reviewed and are negative.      Vitals:    05/09/25 1259   BP: 104/68   Pulse: 81     Weight (last 2 days)       Date/Time Weight    05/09/25 1259 78.2 (172.4)            Physical Exam:  Physical Exam  Vitals reviewed.   Constitutional:       General: He is not in acute distress.     Appearance: He is well-developed. He is not diaphoretic.   HENT:      Head: Normocephalic and atraumatic.   Eyes:      General: No scleral icterus.     Conjunctiva/sclera: Conjunctivae normal.   Neck:      Vascular: No JVD.      Trachea: No tracheal deviation.   Cardiovascular:      Rate and Rhythm: Normal rate and regular rhythm.      Pulses: Intact distal pulses.      Heart sounds: Normal heart sounds. No murmur  heard.     No friction rub. No gallop.   Pulmonary:      Effort: Pulmonary effort is normal. No respiratory distress.      Breath sounds: Normal breath sounds. No stridor. No wheezing or rales.   Chest:      Chest wall: No tenderness.   Abdominal:      General: Bowel sounds are normal. There is no distension.      Palpations: Abdomen is soft.      Tenderness: There is no abdominal tenderness.   Musculoskeletal:         General: No tenderness. Normal range of motion.      Cervical back: Normal range of motion and neck supple.   Skin:     General: Skin is warm and dry.      Findings: No erythema.   Neurological:      Mental Status: He is alert and oriented to person, place, and time.      Cranial Nerves: No cranial nerve deficit.      Coordination: Coordination normal.   Psychiatric:         Behavior: Behavior normal.         Thought Content: Thought content normal.         Judgment: Judgment normal.         Naresh Aguero MD

## 2025-05-12 ENCOUNTER — TELEPHONE (OUTPATIENT)
Age: 40
End: 2025-05-12

## 2025-05-12 ENCOUNTER — HOSPITAL ENCOUNTER (OUTPATIENT)
Dept: NON INVASIVE DIAGNOSTICS | Facility: CLINIC | Age: 40
Discharge: HOME | End: 2025-05-12
Payer: COMMERCIAL

## 2025-05-12 VITALS
HEIGHT: 67 IN | HEART RATE: 77 BPM | DIASTOLIC BLOOD PRESSURE: 76 MMHG | OXYGEN SATURATION: 100 % | WEIGHT: 177 LBS | SYSTOLIC BLOOD PRESSURE: 106 MMHG | BODY MASS INDEX: 27.78 KG/M2

## 2025-05-12 VITALS
HEIGHT: 67 IN | DIASTOLIC BLOOD PRESSURE: 68 MMHG | WEIGHT: 172 LBS | BODY MASS INDEX: 27 KG/M2 | HEART RATE: 85 BPM | SYSTOLIC BLOOD PRESSURE: 104 MMHG

## 2025-05-12 DIAGNOSIS — R00.2 PALPITATIONS: ICD-10-CM

## 2025-05-12 DIAGNOSIS — N18.2 CHRONIC KIDNEY DISEASE, STAGE 2 (MILD): ICD-10-CM

## 2025-05-12 LAB
AORTIC ROOT: 3.6 CM
ASCENDING AORTA: 3.1 CM
BSA FOR ECHO PROCEDURE: 1.9 M2
CHEST PAIN STATEMENT: NORMAL
E WAVE DECELERATION TIME: 140 MS
E/A RATIO: 1.4
FRACTIONAL SHORTENING: 31 (ref 28–44)
INTERVENTRICULAR SEPTUM IN DIASTOLE (PARASTERNAL SHORT AXIS VIEW): 0.8 CM
INTERVENTRICULAR SEPTUM: 0.8 CM (ref 0.6–1.1)
LAAS-AP2: 11.9 CM2
LAAS-AP4: 11.9 CM2
LEFT ATRIUM SIZE: 3 CM
LEFT ATRIUM VOLUME (MOD BIPLANE): 26 ML
LEFT ATRIUM VOLUME INDEX (MOD BIPLANE): 13.7 ML/M2
LEFT INTERNAL DIMENSION IN SYSTOLE: 3.1 CM (ref 2.1–4)
LEFT VENTRICULAR INTERNAL DIMENSION IN DIASTOLE: 4.5 CM (ref 3.5–6)
LEFT VENTRICULAR POSTERIOR WALL IN END DIASTOLE: 0.8 CM
LEFT VENTRICULAR STROKE VOLUME: 54 ML
LV EF US.2D.A4C+ESTIMATED: 52 %
LVSV (TEICH): 54 ML
MAX DIASTOLIC BP: 88 MMHG
MAX HR PERCENT: 87 %
MAX HR: 160 BPM
MAX PREDICTED HEART RATE: 183 BPM
MV E'TISSUE VEL-LAT: 14 CM/S
MV E'TISSUE VEL-SEP: 10 CM/S
MV PEAK A VEL: 0.45 M/S
MV PEAK E VEL: 63 CM/S
MV STENOSIS PRESSURE HALF TIME: 40 MS
MV VALVE AREA P 1/2 METHOD: 5.5
PROTOCOL NAME: NORMAL
RATE PRESSURE PRODUCT: NORMAL
REASON FOR TERMINATION: NORMAL
RIGHT ATRIUM AREA SYSTOLE A4C: 14.2 CM2
RIGHT VENTRICLE ID DIMENSION: 3.5 CM
SL CV LEFT ATRIUM LENGTH A2C: 4.4 CM
SL CV LV EF: 55
SL CV PED ECHO LEFT VENTRICLE DIASTOLIC VOLUME (MOD BIPLANE) 2D: 93 ML
SL CV PED ECHO LEFT VENTRICLE SYSTOLIC VOLUME (MOD BIPLANE) 2D: 39 ML
SL CV STRESS RECOVERY BP: NORMAL MMHG
SL CV STRESS RECOVERY HR: 98 BPM
SL CV STRESS RECOVERY O2 SAT: 99 %
SL CV STRESS STAGE REACHED: 4
STRESS BASELINE BP: NORMAL MMHG
STRESS BASELINE HR: 77 BPM
STRESS O2 SAT REST: 100 %
STRESS PEAK HR: 160 BPM
STRESS POST ESTIMATED WORKLOAD: 13.4 METS
STRESS POST EXERCISE DUR MIN: 10 MIN
STRESS POST EXERCISE DUR MIN: 10 MIN
STRESS POST EXERCISE DUR SEC: 5 SEC
STRESS POST EXERCISE DUR SEC: 5 SEC
STRESS POST O2 SAT PEAK: 99 %
STRESS POST PEAK BP: 160 MMHG
STRESS POST PEAK HR: 160 BPM
STRESS POST PEAK SYSTOLIC BP: 160 MMHG
TARGET HR FORMULA: NORMAL
TEST INDICATION: NORMAL
TRICUSPID ANNULAR PLANE SYSTOLIC EXCURSION: 2 CM

## 2025-05-12 PROCEDURE — 93306 TTE W/DOPPLER COMPLETE: CPT

## 2025-05-12 PROCEDURE — 93017 CV STRESS TEST TRACING ONLY: CPT

## 2025-05-12 PROCEDURE — 93306 TTE W/DOPPLER COMPLETE: CPT | Performed by: INTERNAL MEDICINE

## 2025-05-12 PROCEDURE — 93016 CV STRESS TEST SUPVJ ONLY: CPT | Performed by: INTERNAL MEDICINE

## 2025-05-12 PROCEDURE — 93018 CV STRESS TEST I&R ONLY: CPT | Performed by: INTERNAL MEDICINE

## 2025-05-12 NOTE — TELEPHONE ENCOUNTER
Pt called wanting to know if he should hold any medications prior to his stress test. Advised pt that since his stress test is at 10am that he should just hold his meds at this time until otherwise directed by provider.     Please advise on cardiac meds prior to stress test.

## 2025-06-02 ENCOUNTER — TELEPHONE (OUTPATIENT)
Age: 40
End: 2025-06-02

## 2025-06-02 NOTE — TELEPHONE ENCOUNTER
Pt called in stating that he no longer uses Dr. Hurt as his PCP and now sees someone at [facility]. He will provide a fax number when he comes into the office for us to send his results to going forward.

## 2025-06-04 ENCOUNTER — TELEPHONE (OUTPATIENT)
Dept: OTHER | Facility: OTHER | Age: 40
End: 2025-06-04

## 2025-06-04 NOTE — TELEPHONE ENCOUNTER
"Rebeca stated, \"Pt was seen on 5/12/25 for Echo and was charged $115 copay which he should not have and we need to reprocess the claim. I would like the office to hold the bill while we processing the claims.\"    "

## 2025-06-05 ENCOUNTER — HOSPITAL ENCOUNTER (EMERGENCY)
Facility: HOSPITAL | Age: 40
Discharge: HOME | End: 2025-06-05
Attending: EMERGENCY MEDICINE
Payer: COMMERCIAL

## 2025-06-05 VITALS
HEART RATE: 81 BPM | DIASTOLIC BLOOD PRESSURE: 82 MMHG | TEMPERATURE: 97.5 F | SYSTOLIC BLOOD PRESSURE: 120 MMHG | OXYGEN SATURATION: 99 % | RESPIRATION RATE: 18 BRPM

## 2025-06-05 DIAGNOSIS — R07.89 CHEST WALL PAIN: Primary | ICD-10-CM

## 2025-06-05 LAB
ANION GAP SERPL CALCULATED.3IONS-SCNC: 9 MMOL/L (ref 4–13)
ATRIAL RATE: 86 BPM
BASOPHILS # BLD AUTO: 0.01 THOUSANDS/ÂΜL (ref 0–0.1)
BASOPHILS NFR BLD AUTO: 0 % (ref 0–1)
BUN SERPL-MCNC: 11 MG/DL (ref 5–25)
CALCIUM SERPL-MCNC: 9.8 MG/DL (ref 8.4–10.2)
CARDIAC TROPONIN I PNL SERPL HS: <2 NG/L (ref ?–50)
CHLORIDE SERPL-SCNC: 104 MMOL/L (ref 96–108)
CK SERPL-CCNC: 321 U/L (ref 39–308)
CO2 SERPL-SCNC: 25 MMOL/L (ref 21–32)
CREAT SERPL-MCNC: 1.14 MG/DL (ref 0.6–1.3)
D DIMER PPP FEU-MCNC: <0.27 UG/ML FEU
EOSINOPHIL # BLD AUTO: 0.04 THOUSAND/ÂΜL (ref 0–0.61)
EOSINOPHIL NFR BLD AUTO: 1 % (ref 0–6)
ERYTHROCYTE [DISTWIDTH] IN BLOOD BY AUTOMATED COUNT: 13.3 % (ref 11.6–15.1)
GFR SERPL CREATININE-BSD FRML MDRD: 81 ML/MIN/1.73SQ M
GLUCOSE SERPL-MCNC: 91 MG/DL (ref 65–140)
HCT VFR BLD AUTO: 44.2 % (ref 36.5–49.3)
HGB BLD-MCNC: 14.8 G/DL (ref 12–17)
IMM GRANULOCYTES # BLD AUTO: 0.01 THOUSAND/UL (ref 0–0.2)
IMM GRANULOCYTES NFR BLD AUTO: 0 % (ref 0–2)
LYMPHOCYTES # BLD AUTO: 0.85 THOUSANDS/ÂΜL (ref 0.6–4.47)
LYMPHOCYTES NFR BLD AUTO: 16 % (ref 14–44)
MCH RBC QN AUTO: 29.8 PG (ref 26.8–34.3)
MCHC RBC AUTO-ENTMCNC: 33.5 G/DL (ref 31.4–37.4)
MCV RBC AUTO: 89 FL (ref 82–98)
MONOCYTES # BLD AUTO: 0.49 THOUSAND/ÂΜL (ref 0.17–1.22)
MONOCYTES NFR BLD AUTO: 9 % (ref 4–12)
NEUTROPHILS # BLD AUTO: 3.85 THOUSANDS/ÂΜL (ref 1.85–7.62)
NEUTS SEG NFR BLD AUTO: 74 % (ref 43–75)
NRBC BLD AUTO-RTO: 0 /100 WBCS
P AXIS: 59 DEGREES
PLATELET # BLD AUTO: 295 THOUSANDS/UL (ref 149–390)
PMV BLD AUTO: 10.3 FL (ref 8.9–12.7)
POTASSIUM SERPL-SCNC: 4.4 MMOL/L (ref 3.5–5.3)
PR INTERVAL: 140 MS
QRS AXIS: 83 DEGREES
QRSD INTERVAL: 80 MS
QT INTERVAL: 348 MS
QTC INTERVAL: 416 MS
RBC # BLD AUTO: 4.96 MILLION/UL (ref 3.88–5.62)
SODIUM SERPL-SCNC: 138 MMOL/L (ref 135–147)
T WAVE AXIS: 48 DEGREES
VENTRICULAR RATE: 86 BPM
WBC # BLD AUTO: 5.25 THOUSAND/UL (ref 4.31–10.16)

## 2025-06-05 PROCEDURE — 80048 BASIC METABOLIC PNL TOTAL CA: CPT | Performed by: EMERGENCY MEDICINE

## 2025-06-05 PROCEDURE — 99284 EMERGENCY DEPT VISIT MOD MDM: CPT

## 2025-06-05 PROCEDURE — 36415 COLL VENOUS BLD VENIPUNCTURE: CPT | Performed by: EMERGENCY MEDICINE

## 2025-06-05 PROCEDURE — 82550 ASSAY OF CK (CPK): CPT | Performed by: EMERGENCY MEDICINE

## 2025-06-05 PROCEDURE — 93005 ELECTROCARDIOGRAM TRACING: CPT

## 2025-06-05 PROCEDURE — 93010 ELECTROCARDIOGRAM REPORT: CPT | Performed by: INTERNAL MEDICINE

## 2025-06-05 PROCEDURE — 99285 EMERGENCY DEPT VISIT HI MDM: CPT | Performed by: EMERGENCY MEDICINE

## 2025-06-05 PROCEDURE — 85379 FIBRIN DEGRADATION QUANT: CPT | Performed by: EMERGENCY MEDICINE

## 2025-06-05 PROCEDURE — 84484 ASSAY OF TROPONIN QUANT: CPT | Performed by: EMERGENCY MEDICINE

## 2025-06-05 PROCEDURE — 85025 COMPLETE CBC W/AUTO DIFF WBC: CPT | Performed by: EMERGENCY MEDICINE

## 2025-06-05 RX ORDER — METHOCARBAMOL 500 MG/1
500 TABLET, FILM COATED ORAL ONCE
Status: COMPLETED | OUTPATIENT
Start: 2025-06-05 | End: 2025-06-05

## 2025-06-05 RX ORDER — LIDOCAINE 50 MG/G
1 PATCH TOPICAL ONCE
Status: DISCONTINUED | OUTPATIENT
Start: 2025-06-05 | End: 2025-06-05 | Stop reason: HOSPADM

## 2025-06-05 RX ORDER — METHOCARBAMOL 500 MG/1
500 TABLET, FILM COATED ORAL 3 TIMES DAILY PRN
Qty: 15 TABLET | Refills: 0 | Status: SHIPPED | OUTPATIENT
Start: 2025-06-05 | End: 2025-06-10

## 2025-06-05 RX ADMIN — METHOCARBAMOL 500 MG: 500 TABLET ORAL at 11:54

## 2025-06-05 RX ADMIN — LIDOCAINE 1 PATCH: 50 PATCH CUTANEOUS at 11:55

## 2025-06-05 NOTE — DISCHARGE INSTRUCTIONS
You may take methocarbamol orally up to 3 times daily to help with muscle spasm.  You may additionally apply over-the-counter lidocaine cream as directed on packaging to help with muscle soreness.  Heat and/or ice may be applied to areas for relief as well.    Keep your appointment with Dr. Craig next month as scheduled.  Contact her office sooner for any concerns especially if you notice any worsening in sleep.

## 2025-06-05 NOTE — ED PROVIDER NOTES
"Time reflects when diagnosis was documented in both MDM as applicable and the Disposition within this note       Time User Action Codes Description Comment    6/5/2025  1:18 PM Venecia Quigley Add [R07.89] Chest wall pain           ED Disposition       ED Disposition   Discharge    Condition   Stable    Date/Time   Thu Jun 5, 2025  1:15 PM    Comment   Vinay Breen discharge to home/self care.                   Assessment & Plan       Medical Decision Making  Amount and/or Complexity of Data Reviewed  Labs: ordered.    Risk  Prescription drug management.        ED Course as of 06/05/25 2108   Thu Jun 05, 2025   1206 Suspect muscular etiology of symptoms given reproduction with certain maneuvers and palpation.  Differential diagnosis additionally includes anxiety, esophageal spasm, reflux.  Doubt PE, ACS, symptomatic hypertension, pneumothorax or diaphragmatic irritation.   1210 Blood pressure has markedly lowered without intervention.  Suspect elevation was at least partially related to anxiety.   1237 CK is mildly elevated which would be consistent with muscular etiology of symptoms.   1305 CBC, chemistry, troponin and D-dimer all normal.  Given very frequent episodes of chest discomfort in setting of negative troponin do feel ACS is excluded.  D-dimer is low/normal excluding PE.     Reviewed study results with patient who did note some improvement in discomfort with lidocaine patch in place and methocarbamol use.  He does share that he feels as though he is \"gasping for air\" at times though has comfortable appearing respirations.  He is relieved to know that no sign of MI/heart attack has been identified.  He will continue methods previously advised to help with panic attacks and follow-up with physician as previously intended.  Difficulty with sleep was brought up by his mother.  She feels he has been sleeping less than usual.  He acknowledges that that it takes a while to fall asleep and that he " often does not get to sleep until 1 or 2 in the morning but then will often still get 6 to 8 hours.  Advised discussing with PCP if sleep pattern worsens/does not improve.    Medications   methocarbamol (ROBAXIN) tablet 500 mg (500 mg Oral Given 6/5/25 1154)       ED Risk Strat Scores                    No data recorded        SBIRT 22yo+      Flowsheet Row Most Recent Value   Initial Alcohol Screen: US AUDIT-C     1. How often do you have a drink containing alcohol? 0 Filed at: 06/05/2025 1117   2. How many drinks containing alcohol do you have on a typical day you are drinking?  0 Filed at: 06/05/2025 1117   3a. Male UNDER 65: How often do you have five or more drinks on one occasion? 0 Filed at: 06/05/2025 1117   3b. FEMALE Any Age, or MALE 65+: How often do you have 4 or more drinks on one occassion? 0 Filed at: 06/05/2025 1117   Audit-C Score 0 Filed at: 06/05/2025 1117   RONNI: How many times in the past year have you...    Used an illegal drug or used a prescription medication for non-medical reasons? Never Filed at: 06/05/2025 1117                            History of Present Illness       Chief Complaint   Patient presents with    Chest Pain     Chest pain x1 week. Radiating to R arm, jaw and head. States he has been doing new exercises lately for his shoulder in physical therapy. Took tylenol and propranolol last night.   Propanolol for anxiety, palpitations and high BP. Patient states SOB and weakness.        Past Medical History[1]   Past Surgical History[2]   Family History[3]   Social History[4]   E-Cigarette/Vaping    E-Cigarette Use Never User       E-Cigarette/Vaping Substances    Nicotine No     THC No     CBD No       I have reviewed and agree with the history as documented.     Vinay is a 39-year-old male presents to the emergency department for evaluation with chest discomfort and dyspnea.  He relates that he thought this might be related to exercises that he performs (scapula and open book  maneuvers learned during physical therapy a few years ago after he had dyspnea following COVID) or related to anxiety.  He became concerned when over the last couple of days the same achiness in the chest was also appreciated in the right shoulder, right upper arm and right side of the neck.  He has had rhinorrhea for a couple of days.  No fever or cough.  He briefly had a cramp in his abdomen last night after taking propranolol.  He has not had other abdominal discomfort.  He did have mild discomfort in the medial aspect of the right mid calf a few days ago.  He appreciated the area was swollen at that point.  Swelling and discomfort have improved although not fully resolved.  No recalled trauma.  No immobilization.    He did try acetaminophen 1000 mg last night without relief.  He notes that NSAIDs are contraindicated for him given CKD.    He did undergo cardiac evaluation including exercise stress test and echocardiogram on May 12 of this year.  EF was 55% and only trace mitral and tricuspid regurgitation was visualized.  No ST/T wave changes identified concerning for ischemia on stress echo.    He did see ENT and underwent scoping with concern for feeling of difficulty swallowing at times.  No concerning abnormality appreciated.          Review of Systems   All other systems reviewed and are negative.          Objective       ED Triage Vitals   Temperature Pulse Blood Pressure Respirations SpO2 Patient Position - Orthostatic VS   06/05/25 1115 06/05/25 1115 06/05/25 1115 06/05/25 1115 06/05/25 1115 06/05/25 1200   97.5 °F (36.4 °C) 86 (!) 173/100 18 98 % Sitting      Temp Source Heart Rate Source BP Location FiO2 (%) Pain Score    06/05/25 1115 06/05/25 1115 06/05/25 1115 -- --    Oral Monitor Right arm        Vitals      Date and Time Temp Pulse SpO2 Resp BP Pain Score FACES Pain Rating User   06/05/25 1321 -- 81 99 % -- 120/82 -- -- SAZ   06/05/25 1200 -- 79 99 % 18 130/79 -- -- AF   06/05/25 1115 97.5 °F  (36.4 °C) 86 98 % 18 173/100 -- -- CR            Physical Exam  Vitals and nursing note reviewed.   Constitutional:       Appearance: He is well-developed.   HENT:      Head: Normocephalic.     Cardiovascular:      Rate and Rhythm: Normal rate and regular rhythm.   Pulmonary:      Effort: Pulmonary effort is normal.      Breath sounds: Normal breath sounds.   Chest:      Chest wall: Tenderness present.      Comments: Mild tenderness over the central, right upper and posterior thoracic regions.  Additionally mild tenderness over the posterolateral aspect of the right side of the neck.  No appreciable cervical lymphadenopathy.  Abdominal:      Tenderness: There is no abdominal tenderness. There is no guarding.     Musculoskeletal:      Cervical back: Normal range of motion.   Lymphadenopathy:      Cervical: No cervical adenopathy.     Skin:     General: Skin is warm and dry.     Neurological:      Mental Status: He is alert.     Psychiatric:         Mood and Affect: Mood normal.         Behavior: Behavior normal.         Results Reviewed       Procedure Component Value Units Date/Time    D-dimer, quantitative [[id number]]  (Normal) Collected: 06/05/25 1153    Lab Status: Final result Specimen: Blood from Arm, Right Updated: 06/05/25 1245     D-Dimer, Quant <0.27 ug/ml FEU     HS Troponin 0hr (reflex protocol) [[id number]]  (Normal) Collected: 06/05/25 1153    Lab Status: Final result Specimen: Blood from Arm, Right Updated: 06/05/25 1244     hs TnI 0hr <2 ng/L     CK [[id number]]  (Abnormal) Collected: 06/05/25 1153    Lab Status: Final result Specimen: Blood from Arm, Right Updated: 06/05/25 1237     Total  U/L     Basic metabolic panel [[id number]] Collected: 06/05/25 1153    Lab Status: Final result Specimen: Blood from Arm, Right Updated: 06/05/25 1237     Sodium 138 mmol/L      Potassium 4.4 mmol/L      Chloride 104 mmol/L      CO2 25 mmol/L      ANION GAP 9 mmol/L      BUN 11 mg/dL      Creatinine 1.14 mg/dL       Glucose 91 mg/dL      Calcium 9.8 mg/dL      eGFR 81 ml/min/1.73sq m     Narrative:      National Kid[employer] Foundation guidelines for Chronic Kidney Disease (CKD):     Stage 1 with normal or high GFR (GFR > 90 mL/min/1.73 square meters)    Stage 2 Mild CKD (GFR = 60-89 mL/min/1.73 square meters)    Stage 3A Moderate CKD (GFR = 45-59 mL/min/1.73 square meters)    Stage 3B Moderate CKD (GFR = 30-44 mL/min/1.73 square meters)    Stage 4 Severe CKD (GFR = 15-29 mL/min/1.73 square meters)    Stage 5 End Stage CKD (GFR <15 mL/min/1.73 square meters)  Note: GFR calculation is accurate only with a steady state creatinine    CBC and differential [[id number]] Collected: 06/05/25 1153    Lab Status: Final result Specimen: Blood from Arm, Right Updated: 06/05/25 1222     WBC 5.25 Thousand/uL      RBC 4.96 Million/uL      Hemoglobin 14.8 g/dL      Hematocrit 44.2 %      MCV 89 fL      MCH 29.8 pg      MCHC 33.5 g/dL      RDW 13.3 %      MPV 10.3 fL      Platelets 295 Thousands/uL      nRBC 0 /100 WBCs      Segmented % 74 %      Immature Grans % 0 %      Lymphocytes % 16 %      Monocytes % 9 %      Eosinophils Relative 1 %      Basophils Relative 0 %      Absolute Neutrophils 3.85 Thousands/µL      Absolute Immature Grans 0.01 Thousand/uL      Absolute Lymphocytes 0.85 Thousands/µL      Absolute Monocytes 0.49 Thousand/µL      Eosinophils Absolute 0.04 Thousand/µL      Basophils Absolute 0.01 Thousands/µL             No orders to display       ECG 12 Lead Documentation Only    Date/Time: 6/5/2025 11:43 AM    Performed by: Venecia Quigley MD  Authorized by: Venecia Quigley MD    ECG reviewed by me, the ED Provider: yes    Patient location:  ED  Rate:     ECG rate:  86    ECG rate assessment: normal    Rhythm:     Rhythm: sinus rhythm    Ectopy:     Ectopy: none    QRS:     QRS axis:  Normal    QRS intervals:  Normal  Conduction:     Conduction: normal    ST segments:     ST segments:   Normal  T waves:     T waves: normal        ED Medication and Procedure Management   Prior to Admission Medications   Prescriptions Last Dose Informant Patient Reported? Taking?   Ascorbic Acid (vitamin C) 100 MG tablet 2025 Self Yes Yes   Sig: Take 100 mg by mouth in the morning.   Blood Pressure Monitoring ([employer] Advanced BP Monitor) LAKESHIA  Self No No   Sig: Use 2 (two) times a day   Cholecalciferol (VITAMIN D PO) 2025 Self Yes Yes   Sig: Take 5,000 Units by mouth in the morning.   Mirabegron ER 25 MG TB24  Self No No   Sig: Take 25 mg by mouth in the morning   acetaminophen (TYLENOL) 325 mg tablet 2025 Self Yes Yes   Sig: Take 650 mg by mouth every 6 (six) hours as needed for mild pain   albuterol (PROVENTIL HFA,VENTOLIN HFA) 90 mcg/act inhaler Past Week Self Yes Yes   Sig: Inhale 2 puffs every 6 (six) hours as needed for wheezing   aluminum-magnesium hydroxide-simethicone (MAALOX) 200-200-20 MG/5ML SUSP Not Taking Self No No   Sig: Take 30 mL by mouth 4 (four) times a day (before meals and at bedtime)   Patient not taking: Reported on 2025   cholecalciferol (VITAMIN D3) 1,000 units tablet 2025 Self Yes Yes   Sig: Take 2,000 Units by mouth in the morning.   cyclobenzaprine (FLEXERIL) 5 mg tablet  Self No No   Sig: Take 1 tablet (5 mg total) by mouth 3 (three) times a day as needed for muscle spasms for up to 9 doses   Patient not taking: Reported on 10/24/2023   erythromycin (ILOTYCIN) ophthalmic ointment Not Taking Self Yes No   Sig: Apply 1/2 inch ribbon to both lower eyelids three times a day for 7 days. Use after cleansing and warm compress.   Patient not taking: Reported on 2025   famotidine (PEPCID) 20 mg tablet Not Taking Self No No   Sig: Take 1 tablet (20 mg total) by mouth 2 (two) times a day   Patient not taking: Reported on 2025   fluticasone (FLONASE) 50 mcg/act nasal spray   Yes No   Si sprays into each nostril daily    Patient not taking: Reported on 2025    hydrOXYzine HCL (ATARAX) 25 mg tablet Not Taking Self No No   Sig: Take 1 tablet (25 mg total) by mouth every 6 (six) hours   Patient not taking: Reported on 6/5/2025   ondansetron (ZOFRAN-ODT) 4 mg disintegrating tablet Not Taking Self No No   Sig: Take 1 tablet (4 mg total) by mouth every 6 (six) hours as needed for nausea or vomiting for up to 10 doses   Patient not taking: Reported on 6/5/2025   pantoprazole (PROTONIX) 20 mg tablet Not Taking Self No No   Sig: Take 1 tablet (20 mg total) by mouth daily   Patient not taking: Reported on 6/5/2025   predniSONE 10 mg tablet Not Taking Self Yes No   Sig: if needed   Patient not taking: Reported on 11/17/2022   prednisoLONE acetate (PRED FORTE) 1 % ophthalmic suspension Not Taking Self Yes No   Sig: PLEASE SEE ATTACHED FOR DETAILED DIRECTIONS   Patient not taking: Reported on 6/5/2025   propranolol (INDERAL) 10 mg tablet 6/4/2025 Self No Yes   Sig: TAKE 1 TABLET BY MOUTH 3 TIMES A DAY AS NEEDED (ANXIETY).   sucralfate (CARAFATE) 1 g tablet Not Taking Self No No   Sig: TAKE 1 TABLET BY MOUTH 3 TIMES A DAY.   Patient not taking: Reported on 6/5/2025   vitamin B-12 (VITAMIN B-12) 500 mcg tablet 6/4/2025 Self Yes Yes   Sig: Take 500 mcg by mouth in the morning.      Facility-Administered Medications: None     Discharge Medication List as of 6/5/2025  1:28 PM        START taking these medications    Details   methocarbamol (ROBAXIN) 500 mg tablet Take 1 tablet (500 mg total) by mouth 3 (three) times a day as needed for muscle spasms for up to 5 days Prn muscle spasms, Starting Thu 6/5/2025, Until Tue 6/10/2025 at 2359, Normal           CONTINUE these medications which have NOT CHANGED    Details   acetaminophen (TYLENOL) 325 mg tablet Take 650 mg by mouth every 6 (six) hours as needed for mild pain, Historical Med      albuterol (PROVENTIL HFA,VENTOLIN HFA) 90 mcg/act inhaler Inhale 2 puffs every 6 (six) hours as needed for wheezing, Historical Med      Ascorbic Acid  (vitamin C) 100 MG tablet Take 100 mg by mouth in the morning., Historical Med      !! Cholecalciferol (VITAMIN D PO) Take 5,000 Units by mouth in the morning., Historical Med      !! cholecalciferol (VITAMIN D3) 1,000 units tablet Take 2,000 Units by mouth in the morning., Historical Med      propranolol (INDERAL) 10 mg tablet TAKE 1 TABLET BY MOUTH 3 TIMES A DAY AS NEEDED (ANXIETY)., Normal      vitamin B-12 (VITAMIN B-12) 500 mcg tablet Take 500 mcg by mouth in the morning., Historical Med      aluminum-magnesium hydroxide-simethicone (MAALOX) 200-200-20 MG/5ML SUSP Take 30 mL by mouth 4 (four) times a day (before meals and at bedtime), Starting Wed 2/12/2025, Normal      Blood Pressure Monitoring ([employer] Advanced BP Monitor) LAKESHIA Use 2 (two) times a day, Starting Tue 2/18/2025, Normal      cyclobenzaprine (FLEXERIL) 5 mg tablet Take 1 tablet (5 mg total) by mouth 3 (three) times a day as needed for muscle spasms for up to 9 doses, Starting Mon 5/22/2023, Normal      erythromycin (ILOTYCIN) ophthalmic ointment Apply 1/2 inch ribbon to both lower eyelids three times a day for 7 days. Use after cleansing and warm compress., Historical Med      famotidine (PEPCID) 20 mg tablet Take 1 tablet (20 mg total) by mouth 2 (two) times a day, Starting Wed 2/12/2025, Normal      fluticasone (FLONASE) 50 mcg/act nasal spray 2 sprays into each nostril daily , Starting Fri 9/21/2018, Until Sat 9/21/2019, Historical Med      hydrOXYzine HCL (ATARAX) 25 mg tablet Take 1 tablet (25 mg total) by mouth every 6 (six) hours, Starting Wed 2/12/2025, Normal      Mirabegron ER 25 MG TB24 Take 25 mg by mouth in the morning, Starting Fri 5/9/2025, Normal      ondansetron (ZOFRAN-ODT) 4 mg disintegrating tablet Take 1 tablet (4 mg total) by mouth every 6 (six) hours as needed for nausea or vomiting for up to 10 doses, Starting Mon 12/23/2024, Normal      pantoprazole (PROTONIX) 20 mg tablet Take 1 tablet (20 mg total) by mouth daily, Starting  Tue 2/18/2025, Normal      prednisoLONE acetate (PRED FORTE) 1 % ophthalmic suspension PLEASE SEE ATTACHED FOR DETAILED DIRECTIONS, Historical Med      predniSONE 10 mg tablet if needed, Starting Sun 8/28/2022, Historical Med      sucralfate (CARAFATE) 1 g tablet TAKE 1 TABLET BY MOUTH 3 TIMES A DAY., Starting Fri 3/14/2025, Normal       !! - Potential duplicate medications found. Please discuss with provider.        No discharge procedures on file.  ED SEPSIS DOCUMENTATION   Time reflects when diagnosis was documented in both MDM as applicable and the Disposition within this note       Time User Action Codes Description Comment    6/5/2025  1:18 PM Venecia Quigley Add [R07.89] Chest wall pain                    [1]   Past Medical History:  Diagnosis Date    Chronic kidney disease     HTN (hypertension), benign 09/18/2015    Hydronephrosis of right kidney     Renal failure     URI, acute 03/20/2017   [2]   Past Surgical History:  Procedure Laterality Date    CYSTOSCOPY  05/21/2024    Dr. Palacios    TONSILLECTOMY AND ADENOIDECTOMY     [3]   Family History  Problem Relation Name Age of Onset    Stroke Father      Diabetes Father      Kidney disease Paternal Aunt      Kidney disease Paternal Uncle      Kidney disease Paternal Grandfather      Cancer Maternal Grandmother      Heart disease Maternal Grandmother     [4]   Social History  Tobacco Use    Smoking status: Never    Smokeless tobacco: Never   Vaping Use    Vaping status: Never Used   Substance Use Topics    Alcohol use: No    Drug use: No        Venecia Quigley MD  06/05/25 9758

## 2025-06-12 ENCOUNTER — CONSULT (OUTPATIENT)
Dept: GASTROENTEROLOGY | Facility: CLINIC | Age: 40
End: 2025-06-12
Payer: COMMERCIAL

## 2025-06-12 VITALS
SYSTOLIC BLOOD PRESSURE: 120 MMHG | BODY MASS INDEX: 25.58 KG/M2 | TEMPERATURE: 98.2 F | WEIGHT: 163 LBS | DIASTOLIC BLOOD PRESSURE: 78 MMHG | HEIGHT: 67 IN

## 2025-06-12 DIAGNOSIS — R10.13 DYSPEPSIA: Primary | ICD-10-CM

## 2025-06-12 DIAGNOSIS — K59.00 CONSTIPATION, UNSPECIFIED CONSTIPATION TYPE: ICD-10-CM

## 2025-06-12 PROCEDURE — 99204 OFFICE O/P NEW MOD 45 MIN: CPT | Performed by: INTERNAL MEDICINE

## 2025-06-12 PROCEDURE — G2211 COMPLEX E/M VISIT ADD ON: HCPCS | Performed by: INTERNAL MEDICINE

## 2025-06-12 RX ORDER — FAMOTIDINE 20 MG/1
20 TABLET, FILM COATED ORAL 2 TIMES DAILY
Qty: 60 TABLET | Refills: 4 | Status: SHIPPED | OUTPATIENT
Start: 2025-06-12

## 2025-06-12 NOTE — PROGRESS NOTES
Name: Vinay Breen      : 1985      MRN: [id number]  Encounter Provider: Tyra Swanson DO  Encounter Date: 2025   Encounter department: [facility] [place]  :  Assessment & Plan  Dyspepsia  Reports reflux and diffuse, intermittent, burning, abdominal pain 2-3 hrs after meals that began in February. States that it is periumbilical and radiates to sides and lower back. Reports eating 1 egg every morning, tomatoes, carrots, cooked chicken, watermelon and bananas. Greasy foods/fried foods, red meats, and bread all trigger symptoms. Switched to eating cooked chicken, which helps with pain. Foods with acid cause symptoms. Did try famotidine after ER visit, which improved symptoms, but he ran out. No alcohol use, never smoker, and no NSAID use. Limited caffeine intake - 1 cup/day. Occasionally nauseous, a couple times a month. Had some dysphagia but feels improved.   FH: Sharpe's esophagus in Mother  Last Hgb 2025: 14.8     Ddx: likely GERD 2/2 diet, less likely cholecystitis, peptic ulcer disease, esophagitis    Plan:  -will r/o Celiac's disease and H. Pylori  -restart famotidine 20mg BID prn  -if symptoms do not improve, consider RUQ US and/or EGD  -f/u in a couple months    Orders:    Celiac Panel/Adult; Future    H. pylori breath test; Future    famotidine (PEPCID) 20 mg tablet; Take 1 tablet (20 mg total) by mouth 2 (two) times a day    Constipation, unspecified constipation type  Hx of new constipation over the last few months. Pt reports that had a colonoscopy in 2019 for rectal bleeding which showed internal hemorrhoids, but otherwise normal -- reviewed in chart. Had hemorrhoidal symptoms again 3 years later. Has been using Preparation H. PCP offerred miralax which has helped. Constipation is about once/week for which he will take the miralax and have improvement. Eats low fiber diet, mainly rice, beans, potatoes, chicken, tomatoes, carrots. Reports blood  when wiping once every 3 years. No black stools.    Plan:  -recommend daily miralax  -increase hydration  -avoid constipation   -repeat colonoscopy at 48yo            History of Present Illness   Vinay Breen 38yo M with PMH of CKD, VDD, anxiety presenting as new consult for hemorrhoids and dyspepsia.     Dyspepsia: Pt reports reflux and diffuse, intermittent abdominal pain that began in February. Describes pain as burning sensation in periumbilical region that radiates to sides and lower back. Reports eating 1 egg every morning, tomatoes, carrots, cooked chicken, watermelon and bananas. Greasy foods/fried foods, red meats, and bread all trigger symptoms. Pain onset is 2-3 hours after eating. Switched to eating cooked chicken, which helps with pain. Foods with acid cause symptoms. Did try famotidine after ER visit, which improved symptoms, but he ran out. No alcohol use, never smoker, and no NSAID use. Limited caffeine intake - 1 cup/day. Occasionally nauseous, a couple times a month. Had some dysphagia but feels improved.     Hemorrhoids/constipation: Pt reports that had a colonoscopy in 6/2019 which was normal -- reviewed in chart. Had hemorrhoidal symptoms again 3 years later. Has been using Preparation H. PCP offerred miralax which has helped. Constipation is about once/week for which he will take the miralax and have improvement. Eats low fiber diet, mainly rice, beans, potatoes, chicken, tomatoes, carrots. Reports blood when wiping once every 3 years. No black stools.     FH:  Mother: has Sharpe's esophagus, GERD   Father: no cancers, paternal grandfather gastric cancer           Review of Systems   Constitutional:  Negative for chills and fever.   Respiratory:  Negative for shortness of breath.    Cardiovascular:  Negative for chest pain.   Gastrointestinal:  Positive for abdominal pain, constipation and nausea. Negative for diarrhea and vomiting.   Neurological:  Negative for dizziness and light-headedness.  "         Objective   /78 (BP Location: Left arm, Patient Position: Sitting, Cuff Size: Adult)   Temp 98.2 °F (36.8 °C) (Tympanic)   Ht 5' 7\" (1.702 m)   Wt 73.9 kg (163 lb)   BMI 25.53 kg/m²      Physical Exam  Constitutional:       Appearance: He is well-developed.   HENT:      Head: Normocephalic and atraumatic.     Cardiovascular:      Rate and Rhythm: Normal rate and regular rhythm.      Heart sounds: No murmur heard.  Pulmonary:      Effort: Pulmonary effort is normal.      Breath sounds: Normal breath sounds.   Abdominal:      General: Abdomen is flat. Bowel sounds are normal. There is abdominal bruit. There is no distension.      Palpations: Abdomen is soft. There is no mass.      Tenderness: There is generalized abdominal tenderness. Negative signs include Hunter's sign.     Skin:     General: Skin is warm and dry.     Neurological:      Mental Status: He is alert.     Psychiatric:         Mood and Affect: Mood normal.         Behavior: Behavior normal.           "

## 2025-06-12 NOTE — ASSESSMENT & PLAN NOTE
Hx of new constipation over the last few months. Pt reports that had a colonoscopy in 6/2019 for rectal bleeding which showed internal hemorrhoids, but otherwise normal -- reviewed in chart. Had hemorrhoidal symptoms again 3 years later. Has been using Preparation H. PCP offerred miralax which has helped. Constipation is about once/week for which he will take the miralax and have improvement. Eats low fiber diet, mainly rice, beans, potatoes, chicken, tomatoes, carrots. Reports blood when wiping once every 3 years. No black stools.    Plan:  -recommend daily miralax  -increase hydration  -avoid constipation   -repeat colonoscopy at 46yo

## 2025-07-05 DIAGNOSIS — R10.13 DYSPEPSIA: ICD-10-CM

## 2025-07-07 RX ORDER — FAMOTIDINE 20 MG/1
20 TABLET, FILM COATED ORAL 2 TIMES DAILY
Qty: 180 TABLET | Refills: 1 | Status: SHIPPED | OUTPATIENT
Start: 2025-07-07